# Patient Record
Sex: FEMALE | Race: WHITE | NOT HISPANIC OR LATINO | Employment: FULL TIME | URBAN - METROPOLITAN AREA
[De-identification: names, ages, dates, MRNs, and addresses within clinical notes are randomized per-mention and may not be internally consistent; named-entity substitution may affect disease eponyms.]

---

## 2017-07-07 DIAGNOSIS — T50.2X5A: Primary | ICD-10-CM

## 2017-07-07 DIAGNOSIS — E03.9 HYPOTHYROIDISM: ICD-10-CM

## 2017-07-07 RX ORDER — LEVOTHYROXINE SODIUM 75 UG/1
TABLET ORAL
Qty: 90 TABLET | Refills: 0 | Status: SHIPPED | OUTPATIENT
Start: 2017-07-07 | End: 2017-09-20 | Stop reason: SDUPTHER

## 2017-07-31 ENCOUNTER — LAB VISIT (OUTPATIENT)
Dept: LAB | Facility: HOSPITAL | Age: 42
End: 2017-07-31
Attending: EMERGENCY MEDICINE
Payer: COMMERCIAL

## 2017-07-31 DIAGNOSIS — T50.2X5A: ICD-10-CM

## 2017-07-31 DIAGNOSIS — E03.9 HYPOTHYROIDISM: ICD-10-CM

## 2017-07-31 LAB
ALBUMIN SERPL BCP-MCNC: 3.9 G/DL
ANION GAP SERPL CALC-SCNC: 11 MMOL/L
BUN SERPL-MCNC: 11 MG/DL
CALCIUM SERPL-MCNC: 9.1 MG/DL
CHLORIDE SERPL-SCNC: 106 MMOL/L
CO2 SERPL-SCNC: 22 MMOL/L
CREAT SERPL-MCNC: 0.9 MG/DL
EST. GFR  (AFRICAN AMERICAN): >60 ML/MIN/1.73 M^2
EST. GFR  (NON AFRICAN AMERICAN): >60 ML/MIN/1.73 M^2
GLUCOSE SERPL-MCNC: 83 MG/DL
PHOSPHATE SERPL-MCNC: 3 MG/DL
POTASSIUM SERPL-SCNC: 4.3 MMOL/L
SODIUM SERPL-SCNC: 139 MMOL/L
TSH SERPL DL<=0.005 MIU/L-ACNC: 2.44 UIU/ML

## 2017-07-31 PROCEDURE — 80069 RENAL FUNCTION PANEL: CPT

## 2017-07-31 PROCEDURE — 84443 ASSAY THYROID STIM HORMONE: CPT

## 2017-07-31 PROCEDURE — 36415 COLL VENOUS BLD VENIPUNCTURE: CPT | Mod: PO

## 2017-09-20 DIAGNOSIS — E03.9 HYPOTHYROIDISM: ICD-10-CM

## 2017-09-20 RX ORDER — LEVOTHYROXINE SODIUM 75 UG/1
TABLET ORAL
Qty: 90 TABLET | Refills: 0 | Status: SHIPPED | OUTPATIENT
Start: 2017-09-20 | End: 2017-12-17 | Stop reason: SDUPTHER

## 2017-12-17 DIAGNOSIS — E03.9 HYPOTHYROIDISM: ICD-10-CM

## 2017-12-17 RX ORDER — LEVOTHYROXINE SODIUM 75 UG/1
TABLET ORAL
Qty: 90 TABLET | Refills: 1 | Status: SHIPPED | OUTPATIENT
Start: 2017-12-17 | End: 2018-06-12 | Stop reason: SDUPTHER

## 2017-12-18 ENCOUNTER — OFFICE VISIT (OUTPATIENT)
Dept: URGENT CARE | Facility: CLINIC | Age: 42
End: 2017-12-18
Payer: COMMERCIAL

## 2017-12-18 VITALS
HEART RATE: 67 BPM | TEMPERATURE: 98 F | RESPIRATION RATE: 16 BRPM | OXYGEN SATURATION: 98 % | DIASTOLIC BLOOD PRESSURE: 6 MMHG | SYSTOLIC BLOOD PRESSURE: 104 MMHG

## 2017-12-18 DIAGNOSIS — J10.1 INFLUENZA B: Primary | ICD-10-CM

## 2017-12-18 LAB
CTP QC/QA: YES
FLUAV AG NPH QL: NEGATIVE
FLUBV AG NPH QL: POSITIVE

## 2017-12-18 PROCEDURE — 99213 OFFICE O/P EST LOW 20 MIN: CPT | Mod: 25,S$GLB,, | Performed by: INTERNAL MEDICINE

## 2017-12-18 PROCEDURE — 87804 INFLUENZA ASSAY W/OPTIC: CPT | Mod: QW,S$GLB,, | Performed by: INTERNAL MEDICINE

## 2017-12-18 RX ORDER — OSELTAMIVIR PHOSPHATE 75 MG/1
75 CAPSULE ORAL 2 TIMES DAILY
Qty: 10 CAPSULE | Refills: 0 | Status: SHIPPED | OUTPATIENT
Start: 2017-12-18 | End: 2017-12-23

## 2017-12-18 NOTE — PATIENT INSTRUCTIONS
Influenza (Adult)    Influenza is also called the flu. It is a viral illness that affects the air passages of your lungs. It is different from the common cold. The flu can easily be passed from one to person to another. It may be spread through the air by coughing and sneezing. Or it can be spread by touching the sick person and then touching your own eyes, nose, or mouth.  The flu starts 1 to 3 days after you are exposed to the flu virus. It may last for 1 to 2 weeks but many people feel tired or fatigued for many weeks afterward. You usually dont need to take antibiotics unless you have a complication. This might be an ear or sinus infection or pneumonia.  Symptoms of the flu may be mild or severe. They can include extreme tiredness (wanting to stay in bed all day), chills, fevers, muscle aches, soreness with eye movement, headache, and a dry, hacking cough.  Home care  Follow these guidelines when caring for yourself at home:  · Avoid being around cigarette smoke, whether yours or other peoples.  · Acetaminophen or ibuprofen will help ease your fever, muscle aches, and headache. Dont give aspirin to anyone younger than 18 who has the flu. Aspirin can harm the liver.  · Nausea and loss of appetite are common with the flu. Eat light meals. Drink 6 to 8 glasses of liquids every day. Good choices are water, sport drinks, soft drinks without caffeine, juices, tea, and soup. Extra fluids will also help loosen secretions in your nose and lungs.  · Over-the-counter cold medicines will not make the flu go away faster. But the medicines may help with coughing, sore throat, and congestion in your nose and sinuses. Dont use a decongestant if you have high blood pressure.  · Stay home until your fever has been gone for at least 24 hours without using medicine to reduce fever.  Follow-up care  Follow up with your healthcare provider, or as advised, if you are not getting better over the next week.  If you are age 65 or  older, talk with your provider about getting a pneumococcal vaccine every 5 years. You should also get this vaccine if you have chronic asthma or COPD. All adults should get a flu vaccine every fall. Ask your provider about this.  When to seek medical advice  Call your healthcare provider right away if any of these occur:  · Cough with lots of colored mucus (sputum) or blood in your mucus  · Chest pain, shortness of breath, wheezing, or trouble breathing  · Severe headache, or face, neck, or ear pain  · New rash with fever  · Fever of 100.4°F (38°C) or higher, or as directed by your healthcare provider  · Confusion, behavior change, or seizure  · Severe weakness or dizziness  · You get a new fever or cough after getting better for a few days  .

## 2017-12-18 NOTE — PROGRESS NOTES
Subjective:       Patient ID: Brenna Soni is a 42 y.o. female.    Vitals:  oral temperature is 98 °F (36.7 °C). Her blood pressure is 104/6 (abnormal) and her pulse is 67. Her respiration is 16 and oxygen saturation is 98%.     Chief Complaint: Cough    PT C/O PRODUCTIVE COUGH, 2 DAYS, NASAL CONGESTION, POST NASAL DRIP, SORE THROAT, HEADACHES, LEFT EAR CONGESTION, BODY ACHES, CHILLS, TEMP AT HOME 99.8 ORAL, TAKING MOTRIN AND ZYRTEC WITH MILD RELIEF       Cough   This is a new problem. The current episode started in the past 7 days. The problem has been unchanged. The problem occurs constantly. The cough is productive of sputum. Associated symptoms include chills, ear congestion, headaches, myalgias, nasal congestion, postnasal drip and a sore throat. Pertinent negatives include no chest pain, ear pain, eye redness, fever, shortness of breath or wheezing. Treatments tried: MOTRIN, ZYRTEC. The treatment provided mild relief.     Review of Systems   Constitution: Positive for chills and malaise/fatigue. Negative for fever.   HENT: Positive for congestion, postnasal drip and sore throat. Negative for ear pain and hoarse voice.    Eyes: Negative for discharge and redness.   Cardiovascular: Negative for chest pain, dyspnea on exertion and leg swelling.   Respiratory: Positive for cough and sputum production. Negative for shortness of breath and wheezing.    Musculoskeletal: Positive for myalgias.   Gastrointestinal: Negative for abdominal pain and nausea.   Neurological: Positive for headaches.       Objective:      Physical Exam   Constitutional: She is oriented to person, place, and time. She appears well-developed and well-nourished. She is cooperative.  Non-toxic appearance. She does not appear ill. No distress.   HENT:   Head: Normocephalic and atraumatic.   Right Ear: Hearing, external ear and ear canal normal. A middle ear effusion is present.   Left Ear: Hearing, external ear and ear canal normal. A  middle ear effusion is present.   Nose: Nose normal. No mucosal edema, rhinorrhea or nasal deformity. No epistaxis. Right sinus exhibits no maxillary sinus tenderness and no frontal sinus tenderness. Left sinus exhibits no maxillary sinus tenderness and no frontal sinus tenderness.   Mouth/Throat: Uvula is midline and mucous membranes are normal. No trismus in the jaw. Normal dentition. No uvula swelling. Posterior oropharyngeal erythema present.   Eyes: Conjunctivae and lids are normal.   Sclera clear bilat   Neck: Trachea normal, full passive range of motion without pain and phonation normal. Neck supple.   Cardiovascular: Normal rate, regular rhythm, normal heart sounds, intact distal pulses and normal pulses.    Pulmonary/Chest: Effort normal and breath sounds normal. No respiratory distress.   Abdominal: Soft. Normal appearance and bowel sounds are normal.   Musculoskeletal: Normal range of motion. She exhibits no edema or deformity.   Neurological: She is alert and oriented to person, place, and time. She exhibits normal muscle tone. Coordination normal.   Skin: Skin is warm, dry and intact. No rash noted. She is not diaphoretic.   Psychiatric: She has a normal mood and affect. Her speech is normal. Cognition and memory are normal.   Nursing note and vitals reviewed.      Assessment:       1. Influenza B        Plan:         Influenza B  -     POCT Influenza A/B  -     oseltamivir (TAMIFLU) 75 MG capsule; Take 1 capsule (75 mg total) by mouth 2 (two) times daily.  Dispense: 10 capsule; Refill: 0

## 2018-02-07 ENCOUNTER — TELEPHONE (OUTPATIENT)
Dept: OPTOMETRY | Facility: CLINIC | Age: 43
End: 2018-02-07

## 2018-03-26 ENCOUNTER — HOSPITAL ENCOUNTER (OUTPATIENT)
Dept: RADIOLOGY | Facility: HOSPITAL | Age: 43
Discharge: HOME | End: 2018-03-26
Attending: INTERNAL MEDICINE
Payer: COMMERCIAL

## 2018-03-26 ENCOUNTER — DOCUMENTATION ONLY (OUTPATIENT)
Dept: FAMILY MEDICINE | Facility: CLINIC | Age: 43
End: 2018-03-26

## 2018-03-26 DIAGNOSIS — M54.50 CHRONIC BILATERAL LOW BACK PAIN WITHOUT SCIATICA: ICD-10-CM

## 2018-03-26 DIAGNOSIS — G89.29 CHRONIC BILATERAL LOW BACK PAIN WITHOUT SCIATICA: ICD-10-CM

## 2018-03-26 PROCEDURE — 72110 X-RAY EXAM L-2 SPINE 4/>VWS: CPT | Mod: 26,,, | Performed by: RADIOLOGY

## 2018-03-26 PROCEDURE — 72110 X-RAY EXAM L-2 SPINE 4/>VWS: CPT | Mod: TC,FY,PO

## 2018-03-26 NOTE — PROGRESS NOTES
"Celecoxib 200mg    3/26/18: PA Initiated  CMM key: [id number]      "Drug is covered by current benefit plan. No further PA activity needed."  "

## 2018-03-28 ENCOUNTER — TELEPHONE (OUTPATIENT)
Dept: FAMILY MEDICINE | Facility: CLINIC | Age: 43
End: 2018-03-28

## 2018-03-28 NOTE — TELEPHONE ENCOUNTER
----- Message from Lynette Haq sent at 3/28/2018 10:11 AM CDT -----  Contact: [facility]/Angela/[phone]  Physical Therapy states that they faxed a prescription to continue physical therapy to your office.  Please sign and fax back to [phone].  Any questions call [facility]/Angela at [phone].

## 2018-04-16 PROBLEM — G89.29 CHRONIC BILATERAL LOW BACK PAIN WITHOUT SCIATICA: Status: ACTIVE | Noted: 2018-04-16

## 2018-04-16 PROBLEM — M54.50 CHRONIC BILATERAL LOW BACK PAIN WITHOUT SCIATICA: Status: ACTIVE | Noted: 2018-04-16

## 2018-04-16 PROBLEM — M47.816 LUMBAR SPONDYLOSIS: Status: ACTIVE | Noted: 2018-04-16

## 2018-04-16 PROBLEM — M79.18 MYOFASCIAL PAIN SYNDROME: Status: ACTIVE | Noted: 2018-04-16

## 2018-04-16 NOTE — PROGRESS NOTES
[facility] New Patient Evaluation    Referred by: Niraj Henderson MD  Reason for referral: M54.5,G89.29 (ICD-10-CM) - Chronic bilateral low back pain without sciatica    CC:   Chief Complaint   Patient presents with    Low-back Pain    Hip Pain     bilateral     No flowsheet data found.    HPI:   Brenna Soni is a 42 y.o. female who complains of low back pain radiating into the hips bilaterally.  She recently completed a medrol dose pack that helped tremendously and she stopped going to the gym for 3 weeks to let her body rest.    Onset: long-standing and insidious  Current Pain Score: 5/10  Typical Range: 5-9/10  Quality: Aching, Throbbing, Tight and Band-like  Radiation: across the low back and hips  Worsened by: sitting, standing for more than 4 minutes and walking for more than 4 minutes  Improved by: heat, ice, massage and rest all provide short-lived relief    Previous Therapies:  PT/OT: Completed 2 roudns of PT in the last   HEP: Daily stretching and works out regularly with a   TENS: Yes, with minimal relief  Interventions: Denies  Surgery: Denies  Medications:   - NSAIDS: Yes  - MSK Relaxants: Yes  - TCAs:   - SNRIs:   - Topicals: No  - Anticonvulsants: No  - Opioids: No    Current Pain Medications:  1. Ibuprofen 800    2. Flexeril  3. Robaxin    Review of Systems:  Review of Systems   Constitutional: Negative for chills and fever.   HENT: Negative for nosebleeds.    Eyes: Negative for pain.   Respiratory: Negative for hemoptysis.    Cardiovascular: Negative for chest pain.   Gastrointestinal: Negative for nausea and vomiting.   Genitourinary: Negative for dysuria.   Musculoskeletal: Positive for back pain. Negative for falls.   Skin: Negative for rash.   Neurological: Negative for sensory change and focal weakness.   Endo/Heme/Allergies: Bruises/bleeds easily.   Psychiatric/Behavioral: The patient has insomnia (due to pain).        History:    Current Outpatient Prescriptions:      bisacodyl (DULCOLAX, BISACODYL,) 5 mg EC tablet, as needed., Disp: , Rfl:     celecoxib (CELEBREX) 200 MG capsule, Take 1 capsule (200 mg total) by mouth once daily. May take 2nd capsule if needed, Disp: 60 capsule, Rfl: 6    cyclobenzaprine (FLEXERIL) 10 MG tablet, Take 1 tablet (10 mg total) by mouth nightly as needed for Muscle spasms., Disp: 30 tablet, Rfl: 5    ibuprofen (ADVIL,MOTRIN) 800 MG tablet, Take 1 tablet (800 mg total) by mouth after meals as needed for Pain. Don't take with Celebrex, Disp: 60 tablet, Rfl: 0    ibuprofen 200 mg Cap, Take 1 tablet by mouth every 8 (eight) hours as needed., Disp: , Rfl:     levothyroxine (SYNTHROID) 75 MCG tablet, TAKE 1 TABLET BY MOUTH DAILY, Disp: 90 tablet, Rfl: 1    LINZESS 290 mcg Cap, Take 1 tablet by mouth once daily., Disp: , Rfl: 0    methocarbamol (ROBAXIN) 500 MG Tab, 1-2 tabs po qid prn muscle spasm, Disp: 90 tablet, Rfl: 3    methylPREDNISolone (MEDROL DOSEPACK) 4 mg tablet, Take as directed, Disp: 21 tablet, Rfl: 0    progesterone (PROMETRIUM) 100 MG capsule, Take 1 capsule by mouth once daily., Disp: , Rfl:     testosterone cypionate (DEPOTESTOTERONE CYPIONATE) 200 mg/mL injection, every 30 days., Disp: , Rfl: 3    vitamin D (VITAMIN D3) 1000 units Tab, Take 185 mg by mouth once daily., Disp: , Rfl:     Past Medical History:   Diagnosis Date    Anal fissure     Hypothyroid 2013    Localized adiposity     trunk    Rectal bleeding     TBI (traumatic brain injury)     concussion, CT done.      Vertigo     Water retention     takes diuretic       Past Surgical History:   Procedure Laterality Date    BREAST SURGERY      augmentation     SECTION      COSMETIC SURGERY  2015    tummy tuck    gastric sleeve      laparoscopic abdominal      times 2    Tummy tuck      uterine ablation         Family History   Problem Relation Age of Onset    Adopted: Yes       Social History     Social History    Marital status:  "     Spouse name: N/A    Number of children: N/A    Years of education: N/A     Occupational History     [employer]     Social History Main Topics    Smoking status: Never Smoker    Smokeless tobacco: Never Used    Alcohol use Yes      Comment: 2-3 glasses of wine/week    Drug use: No    Sexual activity: Not Asked     Other Topics Concern    None     Social History Narrative    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       Review of patient's allergies indicates:  No Known Allergies    Physical Exam:  Vitals:    04/17/18 1555   BP: 117/67   Pulse: 63   Resp: 20   Weight: 67.3 kg (148 lb 5.9 oz)   Height: 5' 4" (1.626 m)   PainSc:   5   PainLoc: Back     General    Nursing note and vitals reviewed.  Constitutional: She is oriented to person, place, and time. She appears well-developed and well-nourished. No distress.   HENT:   Head: Normocephalic and atraumatic.   Nose: Nose normal.   Eyes: Conjunctivae and EOM are normal. Pupils are equal, round, and reactive to light. Right eye exhibits no discharge. Left eye exhibits no discharge. No scleral icterus.   Neck: No JVD present.   Cardiovascular: Intact distal pulses.    Pulmonary/Chest: Effort normal. No respiratory distress.   Abdominal: She exhibits no distension.   Neurological: She is alert and oriented to person, place, and time. Coordination normal.   Psychiatric: She has a normal mood and affect. Her behavior is normal. Judgment and thought content normal.     General Musculoskeletal Exam   Gait: normal     Back (L-Spine & T-Spine) / Neck (C-Spine) Exam     Tenderness Right paramedian tenderness of the Lower L-Spine. Left paramedian tenderness of the Lower L-Spine.     Back (L-Spine & T-Spine) Range of Motion   Extension: abnormal   Flexion: abnormal     Spinal Sensation   Right Side Sensation  L-Spine Level: normal  Left Side Sensation  L-Spine Level: normal    Other She has no scoliosis .      Muscle Strength   Right " Lower Extremity   Hip Flexion: 5/5   Hip Extensors: 5/5  Quadriceps:  5/5   Hamstrin/5   Gastrocsoleus:  5/5/5  Left Lower Extremity   Hip Flexion: 5/5   Hip Extensors: 5/5  Quadriceps:  5/5   Hamstrin/5   Gastrocsoleus:  5/5/5    Reflexes     Left Side  Quadriceps:  2+  Achilles:  2+    Right Side   Quadriceps:  2+  Achilles:  2+    Imaging:  MRI Lumbar Spine W WO Contrast 18  There is normal lordosis of the lumbar spine without spondylolisthesis or spondylosis.  There is no marrow edema to suggest acute fractures or neoplastic processes.  There is a small part of the cyst in the sacral canal at the S. to S3 level.  Tip of the conus is at L1 level.  Paraspinal soft tissues are unremarkable.  L5-S1: There is dehydration of the nucleus pulposus.  There is a mild posterior bulging of the annulus with a associated radial annular fissure.  No significant compression of the thecal sac or  S1 roots or spinal stenosis.  There is mild bilateral foraminal stenosis.  L4-L5: There is dehydration of the nucleus pulposus associated with mild posterior bulge of the annulus and radial annular fissure.  No significant compression of the thecal sac.  There is degenerative hypertrophic facet arthropathy bilaterally.  Mild bilateral foraminal stenosis.  L3-L4: There is disc dehydration associated with a circumferential contained posterior annular bulge and a circumferential posterior annular fissure.  There is mild broad-based posterior compression of the thecal sac.  Degenerative hypertrophic facet arthropathy and mild hypertrophic changes of the ligamentum flavum noted.  No significant central canal spinal stenosis.  L2-L3: There is normal intervertebral disc height.  There is no disc herniations or spinal stenosis or foraminal stenosis.  L1-L2: No disc herniations or spinal stenosis or foraminal stenosis.  T12-L1: There is a Schmorl's node along the superior endplate of L1.  There is a mild posterior bulge of the  annulus without spinal stenosis or foraminal stenosis.  No abnormal intradural enhancing lesions are noted.      Labs:  BMP  Lab Results   Component Value Date     07/31/2017    K 4.3 07/31/2017     07/31/2017    CO2 22 (L) 07/31/2017    BUN 11 07/31/2017    CREATININE 0.9 07/31/2017    CALCIUM 9.1 07/31/2017    ANIONGAP 11 07/31/2017    ESTGFRAFRICA >60.0 07/31/2017    EGFRNONAA >60.0 07/31/2017     Lab Results   Component Value Date    ALT 36 08/28/2016    AST 52 (H) 08/28/2016    ALKPHOS 58 08/28/2016    BILITOT 0.7 08/28/2016       Assessment & Plan:  Problem List Items Addressed This Visit     Chronic bilateral low back pain without sciatica    Lumbar spondylosis - Primary    Myofascial pain syndrome          41 yo F with clbp due to facet arthropathy and myofascial dysfunction.  MRI is significant for evidence of facet disease and multiple levels.  DDD is mild and does not appear to contribute to her pain syndrome at this time.  Pain has been refractory to 2 rounds of PT, OTC medications, and rest.  She would be best served by a trial of Lumbar MBB followed by RFA if appropriate as the underlying cause (arthritis) is unlikely to resolve.  I have also recommended that she consider lower impact exercises to minimize pain in the back.  She would like to research the recommended intervention prior to committing.  I have also encouraged her to limit her NSAID usage if she wishes to minimize the risk of gastrici and stomach ulcers.      Follow Up: RTC prn    Abrahan Caballero Jr, MD  Interventional Pain Medicine / Anesthesiology    Disclaimer: This note was partly generated using dictation software which may occasionally result in transcription errors.

## 2018-04-17 ENCOUNTER — OFFICE VISIT (OUTPATIENT)
Dept: PAIN MEDICINE | Facility: CLINIC | Age: 43
End: 2018-04-17
Payer: COMMERCIAL

## 2018-04-17 VITALS
WEIGHT: 148.38 LBS | HEIGHT: 64 IN | SYSTOLIC BLOOD PRESSURE: 117 MMHG | DIASTOLIC BLOOD PRESSURE: 67 MMHG | BODY MASS INDEX: 25.33 KG/M2 | HEART RATE: 63 BPM | RESPIRATION RATE: 20 BRPM

## 2018-04-17 DIAGNOSIS — G89.29 CHRONIC BILATERAL LOW BACK PAIN WITHOUT SCIATICA: ICD-10-CM

## 2018-04-17 DIAGNOSIS — M54.50 CHRONIC BILATERAL LOW BACK PAIN WITHOUT SCIATICA: ICD-10-CM

## 2018-04-17 DIAGNOSIS — M47.816 LUMBAR SPONDYLOSIS: Primary | ICD-10-CM

## 2018-04-17 DIAGNOSIS — M79.18 MYOFASCIAL PAIN SYNDROME: ICD-10-CM

## 2018-04-17 PROCEDURE — 99244 OFF/OP CNSLTJ NEW/EST MOD 40: CPT | Mod: S$GLB,,, | Performed by: PAIN MEDICINE

## 2018-04-17 PROCEDURE — 99999 PR PBB SHADOW E&M-EST. PATIENT-LVL III: CPT | Mod: PBBFAC,,, | Performed by: PAIN MEDICINE

## 2018-04-17 NOTE — LETTER
April 17, 2018      Niraj Henderson MD  [address] [facility] [address] [place] [address]           [facility]  [address] [facility] [address] [place] [address]  Phone: [phone]  Fax: [phone]          Patient: Brenna Soni   MR Number: [id number]   YOB: 1975   Date of Visit: 4/17/2018       Dear Dr. Niraj Henderson:    Thank you for referring Brenna Soni to me for evaluation. Attached you will find relevant portions of my assessment and plan of care.    If you have questions, please do not hesitate to call me. I look forward to following Brenna Soni along with you.    Sincerely,    Abrahan Caballero Jr., MD    Enclosure  CC:  No Recipients    If you would like to receive this communication electronically, please contact [email or web address] or [phone] to request more information on [employer] Link access.    For providers and/or their staff who would like to refer a patient to [facility], please contact us through our one-stop-shop provider referral line, [facility], at [phone].    If you feel you have received this communication in error or would no longer like to receive these types of communications, please e-mail [email or web address]

## 2018-06-12 ENCOUNTER — LAB VISIT (OUTPATIENT)
Dept: LAB | Facility: HOSPITAL | Age: 43
End: 2018-06-12
Attending: EMERGENCY MEDICINE
Payer: COMMERCIAL

## 2018-06-12 DIAGNOSIS — E03.9 HYPOTHYROIDISM: ICD-10-CM

## 2018-06-12 LAB
ALBUMIN SERPL BCP-MCNC: 3.9 G/DL
ALP SERPL-CCNC: 45 U/L
ALT SERPL W/O P-5'-P-CCNC: 25 U/L
ANION GAP SERPL CALC-SCNC: 6 MMOL/L
AST SERPL-CCNC: 28 U/L
BILIRUB SERPL-MCNC: 0.5 MG/DL
BUN SERPL-MCNC: 9 MG/DL
CALCIUM SERPL-MCNC: 9.6 MG/DL
CHLORIDE SERPL-SCNC: 104 MMOL/L
CO2 SERPL-SCNC: 29 MMOL/L
CREAT SERPL-MCNC: 0.9 MG/DL
EST. GFR  (AFRICAN AMERICAN): >60 ML/MIN/1.73 M^2
EST. GFR  (NON AFRICAN AMERICAN): >60 ML/MIN/1.73 M^2
GLUCOSE SERPL-MCNC: 104 MG/DL
POTASSIUM SERPL-SCNC: 4.3 MMOL/L
PROT SERPL-MCNC: 7.2 G/DL
SODIUM SERPL-SCNC: 139 MMOL/L
TSH SERPL DL<=0.005 MIU/L-ACNC: 0.68 UIU/ML

## 2018-06-12 PROCEDURE — 84443 ASSAY THYROID STIM HORMONE: CPT

## 2018-06-12 PROCEDURE — 80053 COMPREHEN METABOLIC PANEL: CPT

## 2018-06-12 PROCEDURE — 36415 COLL VENOUS BLD VENIPUNCTURE: CPT | Mod: PO

## 2018-06-12 RX ORDER — LEVOTHYROXINE SODIUM 75 UG/1
TABLET ORAL
Qty: 90 TABLET | Refills: 3 | Status: SHIPPED | OUTPATIENT
Start: 2018-06-12 | End: 2019-01-08 | Stop reason: SDUPTHER

## 2018-10-24 ENCOUNTER — OFFICE VISIT (OUTPATIENT)
Dept: FAMILY MEDICINE | Facility: CLINIC | Age: 43
End: 2018-10-24
Payer: COMMERCIAL

## 2018-10-24 VITALS
DIASTOLIC BLOOD PRESSURE: 78 MMHG | WEIGHT: 142.88 LBS | HEIGHT: 64 IN | BODY MASS INDEX: 24.39 KG/M2 | HEART RATE: 52 BPM | SYSTOLIC BLOOD PRESSURE: 100 MMHG

## 2018-10-24 DIAGNOSIS — M75.81 RIGHT ROTATOR CUFF TENDONITIS: Primary | ICD-10-CM

## 2018-10-24 DIAGNOSIS — S49.91XA INJURY OF RIGHT SHOULDER, INITIAL ENCOUNTER: ICD-10-CM

## 2018-10-24 PROCEDURE — 90715 TDAP VACCINE 7 YRS/> IM: CPT | Mod: S$GLB,,, | Performed by: PHYSICIAN ASSISTANT

## 2018-10-24 PROCEDURE — 99214 OFFICE O/P EST MOD 30 MIN: CPT | Mod: 25,S$GLB,, | Performed by: PHYSICIAN ASSISTANT

## 2018-10-24 PROCEDURE — 90471 IMMUNIZATION ADMIN: CPT | Mod: S$GLB,,, | Performed by: PHYSICIAN ASSISTANT

## 2018-10-24 PROCEDURE — 3008F BODY MASS INDEX DOCD: CPT | Mod: CPTII,S$GLB,, | Performed by: PHYSICIAN ASSISTANT

## 2018-10-24 PROCEDURE — 90472 IMMUNIZATION ADMIN EACH ADD: CPT | Mod: S$GLB,,, | Performed by: PHYSICIAN ASSISTANT

## 2018-10-24 PROCEDURE — 99999 PR PBB SHADOW E&M-EST. PATIENT-LVL III: CPT | Mod: PBBFAC,,, | Performed by: PHYSICIAN ASSISTANT

## 2018-10-24 PROCEDURE — 90686 IIV4 VACC NO PRSV 0.5 ML IM: CPT | Mod: S$GLB,,, | Performed by: PHYSICIAN ASSISTANT

## 2018-10-24 RX ORDER — CYCLOBENZAPRINE HCL 10 MG
TABLET ORAL
Refills: 4 | COMMUNITY
Start: 2018-09-05 | End: 2018-10-24

## 2018-10-24 NOTE — PROGRESS NOTES
Subjective:       Patient ID: Brenna Soni is a 43 y.o. female    Chief Complaint: Shoulder Injury (injured right shoulder since March and it's starting to get worst. Injured it snow skiing. Wants flu shot and tetanus)    HPI  Mrs Soni presents today CO right shoulder injury that occurred in March while she was snow skiing.  She does not recall the way that she fell.  Her pain has persisted since her injury.    Review of Systems   Constitutional: Negative for activity change, chills and fever.   HENT: Negative for congestion and sore throat.    Eyes: Negative for visual disturbance.   Respiratory: Negative for cough and shortness of breath.    Cardiovascular: Negative for chest pain and palpitations.   Gastrointestinal: Negative for abdominal pain, diarrhea, nausea and vomiting.   Endocrine: Negative for polydipsia and polyuria.   Musculoskeletal: Positive for arthralgias (right shoulder pain in the anterior shoulder and posterior shoulder, limited ROM). Negative for myalgias.   Skin: Negative for rash.   Neurological: Negative for headaches.   Psychiatric/Behavioral: The patient is not nervous/anxious.         Objective:   Physical Exam   Constitutional: She is oriented to person, place, and time. She appears well-developed and well-nourished. No distress.   HENT:   Head: Normocephalic and atraumatic.   Eyes: EOM are normal. Pupils are equal, round, and reactive to light.   Neck: Normal range of motion. Neck supple.   Cardiovascular: Normal rate, regular rhythm, normal heart sounds and intact distal pulses. Exam reveals no gallop and no friction rub.   No murmur heard.  Pulmonary/Chest: Effort normal and breath sounds normal. No respiratory distress. She has no wheezes. She has no rales. She exhibits no tenderness.   Musculoskeletal: She exhibits tenderness (ttp of the right shoulder anteriorly and posteriorly, positive hawkings test, limited shoudler flexion and abduction to 90 degrees).   No ttp of  the AC joint or laterally over the subacromial bursa, no edema or wound   Neurological: She is alert and oriented to person, place, and time.   Skin: Skin is warm and dry. No rash noted. She is not diaphoretic.   Psychiatric: She has a normal mood and affect. Her behavior is normal. Judgment and thought content normal.        Assessment:       1. Right rotator cuff tendonitis  MRI Shoulder Without Contrast Right    Ambulatory referral to Orthopedics   2. Injury of right shoulder, initial encounter  MRI Shoulder Without Contrast Right    Ambulatory referral to Orthopedics        Plan:       Right rotator cuff tendonitis  -     MRI Shoulder Without Contrast Right; Future; Expected date: 10/24/2018  -     Ambulatory referral to Orthopedics    Injury of right shoulder, initial encounter  -     MRI Shoulder Without Contrast Right; Future; Expected date: 10/24/2018  -     Ambulatory referral to Orthopedics    Other orders  -     (In Office Administered) Tdap Vaccine

## 2018-11-02 DIAGNOSIS — M25.511 RIGHT SHOULDER PAIN, UNSPECIFIED CHRONICITY: Primary | ICD-10-CM

## 2018-11-05 ENCOUNTER — HOSPITAL ENCOUNTER (OUTPATIENT)
Dept: RADIOLOGY | Facility: HOSPITAL | Age: 43
Discharge: HOME | End: 2018-11-05
Attending: ORTHOPAEDIC SURGERY
Payer: COMMERCIAL

## 2018-11-05 ENCOUNTER — OFFICE VISIT (OUTPATIENT)
Dept: ORTHOPEDICS | Facility: CLINIC | Age: 43
End: 2018-11-05
Payer: COMMERCIAL

## 2018-11-05 VITALS — BODY MASS INDEX: 24.24 KG/M2 | HEIGHT: 64 IN | WEIGHT: 142 LBS
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
## 2018-11-05 DIAGNOSIS — M25.511 RIGHT SHOULDER PAIN, UNSPECIFIED CHRONICITY: ICD-10-CM

## 2018-11-05 DIAGNOSIS — G89.29 CHRONIC RIGHT SHOULDER PAIN: Primary | ICD-10-CM

## 2018-11-05 DIAGNOSIS — M25.511 CHRONIC RIGHT SHOULDER PAIN: Primary | ICD-10-CM

## 2018-11-05 DIAGNOSIS — M75.81 ROTATOR CUFF TENDONITIS, RIGHT: ICD-10-CM

## 2018-11-05 PROCEDURE — 99203 OFFICE O/P NEW LOW 30 MIN: CPT | Mod: 25,S$GLB,, | Performed by: ORTHOPAEDIC SURGERY

## 2018-11-05 PROCEDURE — 73030 X-RAY EXAM OF SHOULDER: CPT | Mod: 26,RT,, | Performed by: RADIOLOGY

## 2018-11-05 PROCEDURE — 73030 X-RAY EXAM OF SHOULDER: CPT | Mod: TC,PO,RT

## 2018-11-05 PROCEDURE — 20610 DRAIN/INJ JOINT/BURSA W/O US: CPT | Mod: RT,S$GLB,, | Performed by: ORTHOPAEDIC SURGERY

## 2018-11-05 PROCEDURE — 99999 PR PBB SHADOW E&M-EST. PATIENT-LVL II: CPT | Mod: PBBFAC,,, | Performed by: ORTHOPAEDIC SURGERY

## 2018-11-05 RX ORDER — TRIAMCINOLONE ACETONIDE 40 MG/ML
80 INJECTION, SUSPENSION INTRA-ARTICULAR; INTRAMUSCULAR
Status: DISCONTINUED | OUTPATIENT
Start: 2018-11-05 | End: 2018-11-05 | Stop reason: HOSPADM

## 2018-11-05 RX ADMIN — TRIAMCINOLONE ACETONIDE 80 MG: 40 INJECTION, SUSPENSION INTRA-ARTICULAR; INTRAMUSCULAR at 08:11

## 2018-11-05 NOTE — Clinical Note
November 5, 2018      Jocelynn Hazel PA-C  [address] [facility] [address]  [facility] [address]           [place]  [address] [facility] [address]  [facility] [address]  Phone: [phone]          Patient: Brenna Soni   MR Number: [id number]   YOB: 1975   Date of Visit: 11/5/2018       Dear Jocelynn Hazel:    Thank you for referring Brenna Soni to me for evaluation. Attached you will find relevant portions of my assessment and plan of care.    If you have questions, please do not hesitate to call me. I look forward to following Brenna Soni along with you.    Sincerely,    Jf Rodriguez MD    Enclosure  CC:  No Recipients    If you would like to receive this communication electronically, please contact [email or web address] or [phone] to request more information on [employer] Link access.    For providers and/or their staff who would like to refer a patient to [facility], please contact us through our one-stop-shop provider referral line, [facility], at [phone].    If you feel you have received this communication in error or would no longer like to receive these types of communications, please e-mail [email or web address]

## 2018-11-05 NOTE — PROCEDURES
Large Joint Aspiration/Injection: R subacromial bursa  Date/Time: 11/5/2018 8:08 AM  Performed by: Jf Rodriguez MD  Authorized by: Jf Rodriguez MD     Consent Done?:  Yes (Verbal)  Indications:  Pain  Procedure site marked: Yes    Timeout: Prior to procedure the correct patient, procedure, and site was verified      Location:  Shoulder  Site:  R subacromial bursa  Prep: Patient was prepped and draped in usual sterile fashion    Ultrasonic Guidance for needle placement: No  Needle size:  21 G  Approach:  Posterior  Medications:  80 mg triamcinolone acetonide 40 mg/mL  Patient tolerance:  Patient tolerated the procedure well with no immediate complications

## 2018-11-05 NOTE — PROGRESS NOTES
43 years old, complaining of right shoulder pain, which she has had now for six   months' time.  She describes as decreasing motion and increasing pain over the   past six months, feels it all started after she fell snow skiing.  Rates pain as   2/10 on good days, 8/10 on bad days.  Ibuprofen does not really seem to help   all that much.    PHYSICAL EXAMINATION:  Today shows she has full active and passive motion.  She   has a positive Neer and Hawkin impingement sign.  Painful cuff strength.  No   instability.    She did have an MRI of her shoulder, which shows cuff tendinitis, possible   degenerative labral changes.  Degenerative changes of the AC joint.    ASSESSMENT:  Degenerative disease of the shoulder involving the   acromioclavicular joint, the labrum, and the rotator cuff.    PLAN:  Subacromial injection into the right shoulder.  Recommend a rotator cuff   strengthening and balancing either with physical therapy or home exercise   program.  Follow up as needed.      JAZLYN/LEONARDO  dd: 11/05/2018 10:14:03 (CST)  td: 11/05/2018 23:49:46 (CST)  Doc ID   #[id number]  Job ID #[id number]    CC:     Further History  Aching pain  Worse with activity  Relieved with rest  No other associated symptoms  No other radiation    Further Exam  Alert and oriented  Pleasant  Contralateral limb has appropriate range of motion for age and condition  Contralateral limb has appropriate strength for age and condition  Contralateral limb has appropriate stability  for age and condition  No adenopathy  Pulses are appropriate for current condition  Skin is intact        Chief Complaint    Chief Complaint   Patient presents with    Right Shoulder - Injury, Pain     Fell during snow skiing in March HPI  Brenna Soni is a 43 y.o.  female who presents with       Past Medical History  Past Medical History:   Diagnosis Date    Anal fissure     Hypothyroid 6/13/2013    Localized adiposity     trunk    Rectal bleeding     TBI  (traumatic brain injury) 2012    concussion, CT done.      Vertigo     Water retention     takes diuretic       Past Surgical History  Past Surgical History:   Procedure Laterality Date    ABDOMINOPLASTY N/A 3/19/2015    [facility]    BREAST SURGERY      augmentation     SECTION      COSMETIC SURGERY  2015    tummy tuck    gastric sleeve      laparoscopic abdominal      times 2    Tummy tuck      uterine ablation         Medications  Current Outpatient Medications   Medication Sig    bisacodyl (DULCOLAX, BISACODYL,) 5 mg EC tablet as needed.    celecoxib (CELEBREX) 200 MG capsule Take 1 capsule (200 mg total) by mouth once daily. May take 2nd capsule if needed    ibuprofen (ADVIL,MOTRIN) 800 MG tablet Take 1 tablet (800 mg total) by mouth after meals as needed for Pain. Don't take with Celebrex    levothyroxine (SYNTHROID) 75 MCG tablet TAKE 1 TABLET BY MOUTH DAILY    LINZESS 290 mcg Cap Take 1 tablet by mouth once daily.    vitamin D (VITAMIN D3) 1000 units Tab Take 185 mg by mouth once daily.     No current facility-administered medications for this visit.        Allergies  Review of patient's allergies indicates:  No Known Allergies    Family History  Family History   Adopted: Yes       Social History  Social History     Socioeconomic History    Marital status:      Spouse name: Not on file    Number of children: Not on file    Years of education: Not on file    Highest education level: Not on file   Social Needs    Financial resource strain: Not on file    Food insecurity - worry: Not on file    Food insecurity - inability: Not on file    Transportation needs - medical: Not on file    Transportation needs - non-medical: Not on file   Occupational History     Employer: [employer]   Tobacco Use    Smoking status: Never Smoker    Smokeless tobacco: Never Used   Substance and Sexual Activity    Alcohol use: Yes      Comment: 2-3 glasses of wine/week    Drug use: No    Sexual activity: Not on file   Other Topics Concern    Not on file   Social History Narrative    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               Review of Systems     Constitutional: Negative    HENT: Negative  Eyes: Negative  Respiratory: Negative  Cardiovascular: Negative  Musculoskeletal: HPI  Skin: Negative  Neurological: Negative  Hematological: Negative  Endocrine: Negative                 Physical Exam    There were no vitals filed for this visit.  Body mass index is 24.37 kg/m².  Physical Examination:     General appearance -  well appearing, and in no distress  Mental status - awake  Neck - supple  Chest -  symmetric air entry  Heart - normal rate   Abdomen - soft      Assessment     1. Chronic right shoulder pain    2. Fall, initial encounter    3. Rotator cuff tendonitis, right          Plan

## 2019-01-08 ENCOUNTER — HOSPITAL ENCOUNTER (OUTPATIENT)
Dept: RADIOLOGY | Facility: HOSPITAL | Age: 44
Discharge: HOME | End: 2019-01-08
Attending: PHYSICIAN ASSISTANT
Payer: COMMERCIAL

## 2019-01-08 ENCOUNTER — OFFICE VISIT (OUTPATIENT)
Dept: FAMILY MEDICINE | Facility: CLINIC | Age: 44
End: 2019-01-08
Payer: COMMERCIAL

## 2019-01-08 VITALS
HEIGHT: 64 IN | BODY MASS INDEX: 24.96 KG/M2 | HEART RATE: 65 BPM | DIASTOLIC BLOOD PRESSURE: 80 MMHG | OXYGEN SATURATION: 97 % | SYSTOLIC BLOOD PRESSURE: 110 MMHG | TEMPERATURE: 99 F | WEIGHT: 146.19 LBS

## 2019-01-08 DIAGNOSIS — J40 BRONCHITIS: ICD-10-CM

## 2019-01-08 DIAGNOSIS — H65.03 BILATERAL ACUTE SEROUS OTITIS MEDIA, RECURRENCE NOT SPECIFIED: ICD-10-CM

## 2019-01-08 DIAGNOSIS — B37.31 VAGINAL CANDIDIASIS: ICD-10-CM

## 2019-01-08 DIAGNOSIS — J40 BRONCHITIS: Primary | ICD-10-CM

## 2019-01-08 PROCEDURE — 99214 OFFICE O/P EST MOD 30 MIN: CPT | Mod: S$GLB,,, | Performed by: PHYSICIAN ASSISTANT

## 2019-01-08 PROCEDURE — 71046 X-RAY EXAM CHEST 2 VIEWS: CPT | Mod: TC,FY,PO

## 2019-01-08 PROCEDURE — 99999 PR PBB SHADOW E&M-EST. PATIENT-LVL III: ICD-10-PCS | Mod: PBBFAC,,, | Performed by: PHYSICIAN ASSISTANT

## 2019-01-08 PROCEDURE — 3008F PR BODY MASS INDEX (BMI) DOCUMENTED: ICD-10-PCS | Mod: CPTII,S$GLB,, | Performed by: PHYSICIAN ASSISTANT

## 2019-01-08 PROCEDURE — 3008F BODY MASS INDEX DOCD: CPT | Mod: CPTII,S$GLB,, | Performed by: PHYSICIAN ASSISTANT

## 2019-01-08 PROCEDURE — 71046 XR CHEST PA AND LATERAL: ICD-10-PCS | Mod: 26,,, | Performed by: RADIOLOGY

## 2019-01-08 PROCEDURE — 71046 X-RAY EXAM CHEST 2 VIEWS: CPT | Mod: 26,,, | Performed by: RADIOLOGY

## 2019-01-08 PROCEDURE — 99214 PR OFFICE/OUTPT VISIT, EST, LEVL IV, 30-39 MIN: ICD-10-PCS | Mod: S$GLB,,, | Performed by: PHYSICIAN ASSISTANT

## 2019-01-08 PROCEDURE — 99999 PR PBB SHADOW E&M-EST. PATIENT-LVL III: CPT | Mod: PBBFAC,,, | Performed by: PHYSICIAN ASSISTANT

## 2019-01-08 RX ORDER — TRIAMTERENE AND HYDROCHLOROTHIAZIDE 37.5; 25 MG/1; MG/1
CAPSULE ORAL
COMMUNITY
End: 2019-03-22

## 2019-01-08 RX ORDER — DOCUSATE SODIUM 100 MG/1
CAPSULE, LIQUID FILLED ORAL
COMMUNITY
End: 2021-04-27 | Stop reason: ALTCHOICE

## 2019-01-08 RX ORDER — AMOXICILLIN AND CLAVULANATE POTASSIUM 875; 125 MG/1; MG/1
1 TABLET, FILM COATED ORAL EVERY 12 HOURS
Qty: 20 TABLET | Refills: 0 | Status: SHIPPED | OUTPATIENT
Start: 2019-01-08 | End: 2019-01-18

## 2019-01-08 RX ORDER — LEVOTHYROXINE SODIUM 75 UG/1
TABLET ORAL
COMMUNITY
End: 2019-06-28 | Stop reason: SDUPTHER

## 2019-01-08 RX ORDER — METHYLPREDNISOLONE 4 MG/1
TABLET ORAL
Qty: 1 PACKAGE | Refills: 0 | Status: SHIPPED | OUTPATIENT
Start: 2019-01-08 | End: 2019-01-29

## 2019-01-08 RX ORDER — CODEINE PHOSPHATE AND GUAIFENESIN 10; 100 MG/5ML; MG/5ML
5 SOLUTION ORAL 3 TIMES DAILY PRN
Qty: 236 ML | Refills: 1 | Status: SHIPPED | OUTPATIENT
Start: 2019-01-08 | End: 2019-01-18

## 2019-01-08 RX ORDER — ALBUTEROL SULFATE 90 UG/1
AEROSOL, METERED RESPIRATORY (INHALATION)
Refills: 1 | COMMUNITY
Start: 2019-01-02 | End: 2019-03-22 | Stop reason: ALTCHOICE

## 2019-01-08 RX ORDER — SENNOSIDES 8.6 MG/1
TABLET ORAL
COMMUNITY
End: 2021-04-27 | Stop reason: ALTCHOICE

## 2019-01-08 RX ORDER — FLUCONAZOLE 150 MG/1
150 TABLET ORAL DAILY
Qty: 1 TABLET | Refills: 0 | Status: SHIPPED | OUTPATIENT
Start: 2019-01-08 | End: 2019-01-09

## 2019-01-08 NOTE — PROGRESS NOTES
Subjective:       Patient ID: Brenna Soni is a 43 y.o. female    Chief Complaint: URI (symptoms started on dec 27. Went to urgent care got steroid shot, cortisone, inhaler.Not working at all. Now having constipation, yeast infection (yesterday). Head pressure, mucus on chest, having coughing attack. weakness)    HPI  The patient presents today complaining of cough, nasal congestion, sinus pressure and ear pressure that began on 12/27/2018.   She was seen at urgent care on 01/02/2019 and started on a 7 day course of Doxycyline on 1/2/18, she got a steroid shot and she got prednisone BID X 5 days, she also got an albuterol inhaler that she does not feel is giving her any relief.  Also she reports vaginal itching and discharge that began 2 days ago.    Review of Systems   Constitutional: Negative for chills and fever.   HENT: Positive for congestion, ear pain, postnasal drip, sinus pressure, sinus pain and sore throat.    Eyes: Negative for visual disturbance.   Respiratory: Positive for cough. Negative for shortness of breath and wheezing.    Cardiovascular: Negative for chest pain.   Gastrointestinal: Negative for abdominal pain, diarrhea, nausea and vomiting.   Skin: Negative for rash.   Neurological: Negative for headaches.        Objective:   Physical Exam   Constitutional: She is oriented to person, place, and time. She appears well-developed and well-nourished.   HENT:   Head: Normocephalic and atraumatic.   Right Ear: External ear normal.   Left Ear: External ear normal.   Nose: Nose normal.   Mouth/Throat: Oropharynx is clear and moist. No oropharyngeal exudate.   Bilateral TMs show clear effusion   Eyes: Conjunctivae and EOM are normal. Pupils are equal, round, and reactive to light.   Neck: Normal range of motion. Neck supple. No thyromegaly present.   Cardiovascular: Normal rate, regular rhythm, normal heart sounds and intact distal pulses. Exam reveals no gallop and no friction rub.   No murmur  heard.  Pulmonary/Chest: Effort normal and breath sounds normal. No respiratory distress. She has no wheezes. She has no rales.   Rhonchi heard bilaterally, worse on the left   Musculoskeletal: Normal range of motion. She exhibits no edema.   Lymphadenopathy:     She has no cervical adenopathy.   Neurological: She is alert and oriented to person, place, and time. She has normal reflexes.   Skin: Skin is warm and dry. No rash noted.   Psychiatric: She has a normal mood and affect. Her behavior is normal. Judgment and thought content normal.        Assessment:       1. Bronchitis  fluconazole (DIFLUCAN) 150 MG Tab    methylPREDNISolone (MEDROL DOSEPACK) 4 mg tablet    X-Ray Chest PA And Lateral    amoxicillin-clavulanate 875-125mg (AUGMENTIN) 875-125 mg per tablet   2. Vaginal candidiasis     3. Bilateral acute serous otitis media, recurrence not specified          Plan:       Bronchitis  -     methylPREDNISolone (MEDROL DOSEPACK) 4 mg tablet; use as directed  Dispense: 1 Package; Refill: 0  -     X-Ray Chest PA And Lateral; Future; Expected date: 01/08/2019  -     amoxicillin-clavulanate 875-125mg (AUGMENTIN) 875-125 mg per tablet; Take 1 tablet by mouth every 12 (twelve) hours. for 10 days  Dispense: 20 tablet; Refill: 0  - message sent to Dr. Hill requesting Cheratussin   - I recommended Robitussin or Delsym for daytime cough    Vaginal candidiasis  -     fluconazole (DIFLUCAN) 150 MG Tab; Take 1 tablet (150 mg total) by mouth once daily. for 1 day  Dispense: 1 tablet; Refill: 0    Bilateral acute serous otitis media, recurrence not specified  -     methylPREDNISolone (MEDROL DOSEPACK) 4 mg tablet; use as directed  Dispense: 1 Package; Refill: 0

## 2019-01-08 NOTE — TELEPHONE ENCOUNTER
Wily Hill, I saw your patient today for Bronchitis.  She has been taking Promethazine DM without relief of night time coughing.  Can you please sign.

## 2019-03-22 ENCOUNTER — HOSPITAL ENCOUNTER (OUTPATIENT)
Dept: RADIOLOGY | Facility: HOSPITAL | Age: 44
Discharge: HOME | End: 2019-03-22
Attending: PHYSICIAN ASSISTANT
Payer: COMMERCIAL

## 2019-03-22 ENCOUNTER — OFFICE VISIT (OUTPATIENT)
Dept: FAMILY MEDICINE | Facility: CLINIC | Age: 44
End: 2019-03-22
Payer: COMMERCIAL

## 2019-03-22 VITALS
WEIGHT: 135.56 LBS | RESPIRATION RATE: 18 BRPM | HEIGHT: 64 IN | BODY MASS INDEX: 23.14 KG/M2 | OXYGEN SATURATION: 96 % | SYSTOLIC BLOOD PRESSURE: 96 MMHG | HEART RATE: 68 BPM | DIASTOLIC BLOOD PRESSURE: 62 MMHG | TEMPERATURE: 98 F

## 2019-03-22 DIAGNOSIS — M25.552 PAIN OF BOTH HIP JOINTS: ICD-10-CM

## 2019-03-22 DIAGNOSIS — F43.0 STRESS REACTION: ICD-10-CM

## 2019-03-22 DIAGNOSIS — Z00.00 PERIODIC HEALTH ASSESSMENT, GENERAL SCREENING, ADULT: ICD-10-CM

## 2019-03-22 DIAGNOSIS — M54.50 MIDLINE LOW BACK PAIN WITHOUT SCIATICA, UNSPECIFIED CHRONICITY: ICD-10-CM

## 2019-03-22 DIAGNOSIS — R53.83 OTHER FATIGUE: Primary | ICD-10-CM

## 2019-03-22 DIAGNOSIS — E03.1 CONGENITAL HYPOTHYROIDISM WITHOUT GOITER: Chronic | ICD-10-CM

## 2019-03-22 DIAGNOSIS — M25.551 PAIN OF BOTH HIP JOINTS: ICD-10-CM

## 2019-03-22 DIAGNOSIS — N95.1 PERIMENOPAUSAL SYMPTOMS: ICD-10-CM

## 2019-03-22 PROCEDURE — 73521 X-RAY EXAM HIPS BI 2 VIEWS: CPT | Mod: TC,FY,PO

## 2019-03-22 PROCEDURE — 99999 PR PBB SHADOW E&M-EST. PATIENT-LVL IV: CPT | Mod: PBBFAC,,, | Performed by: PHYSICIAN ASSISTANT

## 2019-03-22 PROCEDURE — 72110 X-RAY EXAM L-2 SPINE 4/>VWS: CPT | Mod: 26,,, | Performed by: RADIOLOGY

## 2019-03-22 PROCEDURE — 99999 PR PBB SHADOW E&M-EST. PATIENT-LVL IV: ICD-10-PCS | Mod: PBBFAC,,, | Performed by: PHYSICIAN ASSISTANT

## 2019-03-22 PROCEDURE — 99214 PR OFFICE/OUTPT VISIT, EST, LEVL IV, 30-39 MIN: ICD-10-PCS | Mod: S$GLB,,, | Performed by: PHYSICIAN ASSISTANT

## 2019-03-22 PROCEDURE — 72110 X-RAY EXAM L-2 SPINE 4/>VWS: CPT | Mod: TC,FY,PO

## 2019-03-22 PROCEDURE — 3008F BODY MASS INDEX DOCD: CPT | Mod: CPTII,S$GLB,, | Performed by: PHYSICIAN ASSISTANT

## 2019-03-22 PROCEDURE — 99214 OFFICE O/P EST MOD 30 MIN: CPT | Mod: S$GLB,,, | Performed by: PHYSICIAN ASSISTANT

## 2019-03-22 PROCEDURE — 73521 X-RAY EXAM HIPS BI 2 VIEWS: CPT | Mod: 26,,, | Performed by: RADIOLOGY

## 2019-03-22 PROCEDURE — 3008F PR BODY MASS INDEX (BMI) DOCUMENTED: ICD-10-PCS | Mod: CPTII,S$GLB,, | Performed by: PHYSICIAN ASSISTANT

## 2019-03-22 PROCEDURE — 72110 XR LUMBAR SPINE COMPLETE 5 VIEW: ICD-10-PCS | Mod: 26,,, | Performed by: RADIOLOGY

## 2019-03-22 PROCEDURE — 73521 XR HIPS BILATERAL 2 VIEW INCL AP PELVIS: ICD-10-PCS | Mod: 26,,, | Performed by: RADIOLOGY

## 2019-03-22 RX ORDER — TESTOSTERONE CYPIONATE 200 MG/ML
0.25 INJECTION, SOLUTION INTRAMUSCULAR
Refills: 0 | COMMUNITY
Start: 2019-02-26 | End: 2019-10-17

## 2019-03-22 NOTE — PROGRESS NOTES
Subjective:       Patient ID: Brenna Soni is a 43 y.o. female.    Chief Complaint: Annual Exam (Patient here for annual exam)    HPI   Fatigue x 2 or 3 mos  Small lump in thyroid area  Weight loss 10 lbs over 2 mos  Temp changes hot and cold feeling  R hip discomfort and stiffness  Sees GYN and taking estrogen and Testerone inj.  Irregular mensis    Stress level 9   Pt under a lot of stress  Pt is adopted with almost no Hx  Pt. Requests full annual exam  Review of Systems   Constitutional: Positive for activity change, appetite change and fatigue. Negative for chills, diaphoresis, fever and unexpected weight change.   HENT: Negative.  Negative for sore throat and trouble swallowing.    Eyes: Negative.    Respiratory: Negative.    Cardiovascular: Negative.  Negative for chest pain and leg swelling.   Gastrointestinal: Negative.    Endocrine: Positive for cold intolerance and heat intolerance. Negative for polydipsia, polyphagia and polyuria.   Genitourinary: Positive for menstrual problem.   Musculoskeletal: Positive for arthralgias, back pain, gait problem and myalgias. Negative for joint swelling, neck pain and neck stiffness.   Skin: Negative.  Negative for rash.   Psychiatric/Behavioral: Positive for sleep disturbance. Negative for agitation, behavioral problems, confusion, decreased concentration, dysphoric mood, hallucinations, self-injury and suicidal ideas. The patient is nervous/anxious. The patient is not hyperactive.        Objective:      Physical Exam   Constitutional: She is oriented to person, place, and time. She appears well-developed and well-nourished. No distress.   HENT:   Head: Normocephalic and atraumatic.   Right Ear: External ear normal.   Left Ear: External ear normal.   Nose: Nose normal.   Mouth/Throat: Oropharynx is clear and moist. No oropharyngeal exudate.   Sinuses non tender  Mucus clear   Eyes: Conjunctivae are normal. No scleral icterus.   Neck: Normal range of motion.  Neck supple. No tracheal deviation present. No thyromegaly present.   Small 2 mm non tender sub Q soft tissue nodule over L thyroid area   Cardiovascular: Normal rate, regular rhythm, normal heart sounds and intact distal pulses. Exam reveals no gallop and no friction rub.   No murmur heard.  Pulmonary/Chest: Effort normal and breath sounds normal. No stridor. No respiratory distress. She has no wheezes. She has no rales.   Abdominal: Soft. Bowel sounds are normal. She exhibits no distension and no mass. There is no tenderness. There is no rebound and no guarding.   Musculoskeletal: She exhibits tenderness. She exhibits no edema.   Focal tenderness over L3 vertebrae area  No muscle tightness noted  No sciatic tenderness  FROM hips  Stiffness and mild discomfort R hip    Lymphadenopathy:     She has no cervical adenopathy.   Neurological: She is alert and oriented to person, place, and time. No sensory deficit. She exhibits normal muscle tone. Coordination normal.   Skin: Skin is warm and dry. No rash noted.   Psychiatric: She has a normal mood and affect. Her behavior is normal. Judgment and thought content normal.   Vitals reviewed.      Assessment:       1. Other fatigue    2. Pain of both hip joints    3. Midline low back pain without sciatica, unspecified chronicity    4. Stress reaction    5. Congenital hypothyroidism without goiter    6. Periodic health assessment, general screening, adult    7. Perimenopausal symptoms        Plan:       Other fatigue  -     Comprehensive metabolic panel; Future; Expected date: 03/22/2019  -     Lipid panel; Future; Expected date: 03/22/2019  -     T3; Future; Expected date: 03/22/2019  -     T4, free; Future; Expected date: 03/22/2019  -     TSH; Future; Expected date: 03/22/2019  -     Vitamin D; Future; Expected date: 03/22/2019  -     Magnesium; Future; Expected date: 03/22/2019  -     Follicle stimulating hormone; Future; Expected date: 03/22/2019  -     C-reactive  protein; Future; Expected date: 03/22/2019  -     Sedimentation rate; Future; Expected date: 03/22/2019  -     CBC auto differential; Future; Expected date: 03/22/2019  -     MILDRED Screen w/Reflex; Future; Expected date: 03/22/2019  -     Urinalysis; Future; Expected date: 03/22/2019    Pain of both hip joints  -     Comprehensive metabolic panel; Future; Expected date: 03/22/2019  -     Lipid panel; Future; Expected date: 03/22/2019  -     T3; Future; Expected date: 03/22/2019  -     T4, free; Future; Expected date: 03/22/2019  -     TSH; Future; Expected date: 03/22/2019  -     Vitamin D; Future; Expected date: 03/22/2019  -     Magnesium; Future; Expected date: 03/22/2019  -     Follicle stimulating hormone; Future; Expected date: 03/22/2019  -     C-reactive protein; Future; Expected date: 03/22/2019  -     Sedimentation rate; Future; Expected date: 03/22/2019  -     CBC auto differential; Future; Expected date: 03/22/2019  -     MILDRED Screen w/Reflex; Future; Expected date: 03/22/2019  -     Urinalysis; Future; Expected date: 03/22/2019  -     X-Ray Lumbar Spine Complete 5 View; Future; Expected date: 03/22/2019  -     X-Ray Hips Bilateral 2 View Incl AP Pelvis; Future; Expected date: 03/22/2019    Midline low back pain without sciatica, unspecified chronicity  -     Comprehensive metabolic panel; Future; Expected date: 03/22/2019  -     Lipid panel; Future; Expected date: 03/22/2019  -     T3; Future; Expected date: 03/22/2019  -     T4, free; Future; Expected date: 03/22/2019  -     TSH; Future; Expected date: 03/22/2019  -     Vitamin D; Future; Expected date: 03/22/2019  -     Magnesium; Future; Expected date: 03/22/2019  -     Follicle stimulating hormone; Future; Expected date: 03/22/2019  -     C-reactive protein; Future; Expected date: 03/22/2019  -     Sedimentation rate; Future; Expected date: 03/22/2019  -     CBC auto differential; Future; Expected date: 03/22/2019  -     MILDRED Screen w/Reflex; Future;  Expected date: 03/22/2019  -     Urinalysis; Future; Expected date: 03/22/2019  -     X-Ray Lumbar Spine Complete 5 View; Future; Expected date: 03/22/2019  -     X-Ray Hips Bilateral 2 View Incl AP Pelvis; Future; Expected date: 03/22/2019    Stress reaction  -     Comprehensive metabolic panel; Future; Expected date: 03/22/2019  -     Lipid panel; Future; Expected date: 03/22/2019  -     T3; Future; Expected date: 03/22/2019  -     T4, free; Future; Expected date: 03/22/2019  -     TSH; Future; Expected date: 03/22/2019  -     Vitamin D; Future; Expected date: 03/22/2019  -     Magnesium; Future; Expected date: 03/22/2019  -     Follicle stimulating hormone; Future; Expected date: 03/22/2019  -     C-reactive protein; Future; Expected date: 03/22/2019  -     Sedimentation rate; Future; Expected date: 03/22/2019  -     CBC auto differential; Future; Expected date: 03/22/2019  -     MILDRED Screen w/Reflex; Future; Expected date: 03/22/2019  -     Urinalysis; Future; Expected date: 03/22/2019    Congenital hypothyroidism without goiter  -     Comprehensive metabolic panel; Future; Expected date: 03/22/2019  -     Lipid panel; Future; Expected date: 03/22/2019  -     T3; Future; Expected date: 03/22/2019  -     T4, free; Future; Expected date: 03/22/2019  -     TSH; Future; Expected date: 03/22/2019  -     Vitamin D; Future; Expected date: 03/22/2019  -     Magnesium; Future; Expected date: 03/22/2019  -     Follicle stimulating hormone; Future; Expected date: 03/22/2019  -     C-reactive protein; Future; Expected date: 03/22/2019  -     Sedimentation rate; Future; Expected date: 03/22/2019  -     CBC auto differential; Future; Expected date: 03/22/2019  -     MILDRED Screen w/Reflex; Future; Expected date: 03/22/2019  -     Urinalysis; Future; Expected date: 03/22/2019    Periodic health assessment, general screening, adult  -     Comprehensive metabolic panel; Future; Expected date: 03/22/2019  -     Lipid panel; Future;  Expected date: 03/22/2019  -     T3; Future; Expected date: 03/22/2019  -     T4, free; Future; Expected date: 03/22/2019  -     TSH; Future; Expected date: 03/22/2019  -     Vitamin D; Future; Expected date: 03/22/2019  -     Magnesium; Future; Expected date: 03/22/2019  -     Follicle stimulating hormone; Future; Expected date: 03/22/2019  -     C-reactive protein; Future; Expected date: 03/22/2019  -     Sedimentation rate; Future; Expected date: 03/22/2019  -     CBC auto differential; Future; Expected date: 03/22/2019  -     MILDRED Screen w/Reflex; Future; Expected date: 03/22/2019  -     Urinalysis; Future; Expected date: 03/22/2019    Perimenopausal symptoms  -     Comprehensive metabolic panel; Future; Expected date: 03/22/2019  -     Lipid panel; Future; Expected date: 03/22/2019  -     T3; Future; Expected date: 03/22/2019  -     T4, free; Future; Expected date: 03/22/2019  -     TSH; Future; Expected date: 03/22/2019  -     Vitamin D; Future; Expected date: 03/22/2019  -     Magnesium; Future; Expected date: 03/22/2019  -     Follicle stimulating hormone; Future; Expected date: 03/22/2019  -     C-reactive protein; Future; Expected date: 03/22/2019  -     Sedimentation rate; Future; Expected date: 03/22/2019  -     CBC auto differential; Future; Expected date: 03/22/2019  -     MILDRED Screen w/Reflex; Future; Expected date: 03/22/2019  -     Urinalysis; Future; Expected date: 03/22/2019    DC shoes with heels  Discussed otc's  Discussed starting SSRI (Celexa )  Pt. Will consider  F/u in next 1 or 2 wks  Pt. Will f/u with her GYN in near future

## 2019-03-25 ENCOUNTER — TELEPHONE (OUTPATIENT)
Dept: FAMILY MEDICINE | Facility: CLINIC | Age: 44
End: 2019-03-25

## 2019-03-27 NOTE — TELEPHONE ENCOUNTER
Larry Mcneal ordered labs & xray on 3/22   I was asking for the results because he will be out of clinic until 3/28   Thank you

## 2019-04-09 DIAGNOSIS — M54.50 CHRONIC BILATERAL LOW BACK PAIN WITHOUT SCIATICA: ICD-10-CM

## 2019-04-09 DIAGNOSIS — G89.29 CHRONIC BILATERAL LOW BACK PAIN WITHOUT SCIATICA: ICD-10-CM

## 2019-04-10 RX ORDER — CELECOXIB 200 MG/1
CAPSULE ORAL
Qty: 60 CAPSULE | Refills: 0 | Status: SHIPPED | OUTPATIENT
Start: 2019-04-10 | End: 2020-07-17

## 2019-05-09 ENCOUNTER — TELEPHONE (OUTPATIENT)
Dept: ORTHOPEDICS | Facility: CLINIC | Age: 44
End: 2019-05-09

## 2019-05-09 NOTE — TELEPHONE ENCOUNTER
Patient stated she would like to be seen for bilateral shoulder pain. Confirmed appt date/time. Understanding verbalized    ----- Message from Lawson Resendiz sent at 5/9/2019  3:03 PM CDT -----  Contact: Patient  Type:  Patient Returning Call    Khushboo called this morning with questions  Best Call Back Number:  [phone]  Additional Information:  Please try again

## 2019-05-10 ENCOUNTER — OFFICE VISIT (OUTPATIENT)
Dept: ORTHOPEDICS | Facility: CLINIC | Age: 44
End: 2019-05-10
Payer: COMMERCIAL

## 2019-05-10 VITALS — HEIGHT: 64 IN | WEIGHT: 133 LBS | BODY MASS INDEX: 22.71 KG/M2

## 2019-05-10 DIAGNOSIS — G89.29 CHRONIC RIGHT SHOULDER PAIN: Primary | ICD-10-CM

## 2019-05-10 DIAGNOSIS — M16.11 PRIMARY OSTEOARTHRITIS OF RIGHT HIP: ICD-10-CM

## 2019-05-10 DIAGNOSIS — M25.551 RIGHT HIP PAIN: ICD-10-CM

## 2019-05-10 DIAGNOSIS — M75.81 ROTATOR CUFF TENDONITIS, RIGHT: ICD-10-CM

## 2019-05-10 DIAGNOSIS — M25.511 CHRONIC RIGHT SHOULDER PAIN: Primary | ICD-10-CM

## 2019-05-10 PROCEDURE — 20610 LARGE JOINT ASPIRATION/INJECTION: R SUBACROMIAL BURSA: ICD-10-PCS | Mod: RT,S$GLB,, | Performed by: ORTHOPAEDIC SURGERY

## 2019-05-10 PROCEDURE — 3008F PR BODY MASS INDEX (BMI) DOCUMENTED: ICD-10-PCS | Mod: CPTII,S$GLB,, | Performed by: ORTHOPAEDIC SURGERY

## 2019-05-10 PROCEDURE — 99999 PR PBB SHADOW E&M-EST. PATIENT-LVL III: ICD-10-PCS | Mod: PBBFAC,,, | Performed by: ORTHOPAEDIC SURGERY

## 2019-05-10 PROCEDURE — 99214 PR OFFICE/OUTPT VISIT, EST, LEVL IV, 30-39 MIN: ICD-10-PCS | Mod: 25,S$GLB,, | Performed by: ORTHOPAEDIC SURGERY

## 2019-05-10 PROCEDURE — 20610 DRAIN/INJ JOINT/BURSA W/O US: CPT | Mod: RT,S$GLB,, | Performed by: ORTHOPAEDIC SURGERY

## 2019-05-10 PROCEDURE — 99999 PR PBB SHADOW E&M-EST. PATIENT-LVL III: CPT | Mod: PBBFAC,,, | Performed by: ORTHOPAEDIC SURGERY

## 2019-05-10 PROCEDURE — 99214 OFFICE O/P EST MOD 30 MIN: CPT | Mod: 25,S$GLB,, | Performed by: ORTHOPAEDIC SURGERY

## 2019-05-10 PROCEDURE — 3008F BODY MASS INDEX DOCD: CPT | Mod: CPTII,S$GLB,, | Performed by: ORTHOPAEDIC SURGERY

## 2019-05-10 RX ORDER — TRIAMCINOLONE ACETONIDE 40 MG/ML
80 INJECTION, SUSPENSION INTRA-ARTICULAR; INTRAMUSCULAR
Status: DISCONTINUED | OUTPATIENT
Start: 2019-05-10 | End: 2019-05-10 | Stop reason: HOSPADM

## 2019-05-10 RX ADMIN — TRIAMCINOLONE ACETONIDE 80 MG: 40 INJECTION, SUSPENSION INTRA-ARTICULAR; INTRAMUSCULAR at 08:05

## 2019-05-10 NOTE — PROCEDURES
Large Joint Aspiration/Injection: R subacromial bursa  Date/Time: 5/10/2019 8:42 AM  Performed by: Jf Rodriguez MD  Authorized by: Jf Rodriguez MD     Consent Done?:  Yes (Verbal)  Indications:  Pain  Procedure site marked: Yes    Timeout: Prior to procedure the correct patient, procedure, and site was verified      Location:  Shoulder  Site:  R subacromial bursa  Prep: Patient was prepped and draped in usual sterile fashion    Ultrasonic Guidance for needle placement: No  Needle size:  21 G  Approach:  Posterior  Medications:  80 mg triamcinolone acetonide 40 mg/mL  Patient tolerance:  Patient tolerated the procedure well with no immediate complications

## 2019-05-10 NOTE — PROGRESS NOTES
Dictation #1  MRN:[id number]  CSN:[id number]  Further History  Aching pain  Worse with activity  Relieved with rest  No other associated symptoms  No other radiation    Further Exam  Alert and oriented  Pleasant  Contralateral limb has appropriate range of motion for age and condition  Contralateral limb has appropriate strength for age and condition  Contralateral limb has appropriate stability  for age and condition  No adenopathy  Pulses are appropriate for current condition  Skin is intact        Chief Complaint    Chief Complaint   Patient presents with    Right Shoulder - Pain    Right Hip - Pain       HPI  Brenna Soni is a 43 y.o.  female who presents with       Past Medical History  Past Medical History:   Diagnosis Date    Anal fissure     Hypothyroid 2013    Localized adiposity     trunk    Rectal bleeding     TBI (traumatic brain injury)     concussion, CT done.      Vertigo     Water retention     takes diuretic       Past Surgical History  Past Surgical History:   Procedure Laterality Date    ABDOMINOPLASTY N/A 3/19/2015    Per[facility] at [facility]    AUGMENTATION OF BREAST Bilateral     Lift also    BREAST SURGERY      augmentation     SECTION      COSMETIC SURGERY  2015    tummy tuck    gastric sleeve      laparoscopic abdominal      times 2    Tummy tuck      uterine ablation         Medications  Current Outpatient Medications   Medication Sig    celecoxib (CELEBREX) 200 MG capsule TAKE 1 CAPSULE(200 MG) BY MOUTH EVERY DAY. MAY TAKE 2ND CAPSULE IF NEEDED    docusate sodium (COLACE) 100 MG capsule Colace 100 mg capsule   Take 2 capsules every day PRn by oral route in the evening for 30 days.    ibuprofen (ADVIL,MOTRIN) 800 MG tablet Take 1 tablet (800 mg total) by mouth after meals as needed for Pain. Don't take with Celebrex    levothyroxine (SYNTHROID) 75 MCG tablet levothyroxine 75 mcg tablet   Take 1 tablet every day by  oral route.    senna (SENOKOT) 8.6 mg tablet Senna Lax 8.6 mg tablet   Take 2 tablets as needed by oral route.    testosterone cypionate (DEPOTESTOTERONE CYPIONATE) 200 mg/mL injection Inject 0.25 mLs into the muscle. Every other month     No current facility-administered medications for this visit.        Allergies  Review of patient's allergies indicates:  No Known Allergies    Family History  Family History   Adopted: Yes       Social History  Social History     Socioeconomic History    Marital status:      Spouse name: Not on file    Number of children: Not on file    Years of education: Not on file    Highest education level: Not on file   Occupational History     Employer: [employer]   Social Needs    Financial resource strain: Not on file    Food insecurity:     Worry: Not on file     Inability: Not on file    Transportation needs:     Medical: Not on file     Non-medical: Not on file   Tobacco Use    Smoking status: Never Smoker    Smokeless tobacco: Never Used   Substance and Sexual Activity    Alcohol use: Yes     Comment: 2-3 glasses of wine/week    Drug use: No    Sexual activity: Not on file   Lifestyle    Physical activity:     Days per week: Not on file     Minutes per session: Not on file    Stress: Not on file   Relationships    Social connections:     Talks on phone: Not on file     Gets together: Not on file     Attends [employer] service: Not on file     Active member of club or organization: Not on file     Attends meetings of clubs or organizations: Not on file     Relationship status: Not on file   Other Topics Concern    Not on file   Social History Narrative    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               Review of Systems     Constitutional: Negative    HENT: Negative  Eyes: Negative  Respiratory: Negative  Cardiovascular: Negative  Musculoskeletal: HPI  Skin: Negative  Neurological: Negative  Hematological: Negative  Endocrine:  Negative                 Physical Exam    There were no vitals filed for this visit.  Body mass index is 22.83 kg/m².  Physical Examination:     General appearance -  well appearing, and in no distress  Mental status - awake  Neck - supple  Chest -  symmetric air entry  Heart - normal rate   Abdomen - soft      Assessment     1. Chronic right shoulder pain    2. Rotator cuff tendonitis, right    3. Right hip pain    4. Primary osteoarthritis of right hip          Plan

## 2019-05-11 NOTE — PROGRESS NOTES
SUBJECTIVE:  [age] years old, recurrent pain in the right shoulder.  We   had given her injection, in which she responded very well to.  Having pain with   overhead activity, reaching behind her back.  Also has some limited range of   motion of the left shoulder.  All complains of pain in the right hip for a   couple months right at the region of greater trochanter.    PHYSICAL EXAMINATION:  MUSCULOSKELETAL:  Today shows that she has positive Neer and Jenkins impingement   sign, bilateral shoulders.  Cuff strength was weak and painful.  Cervical   motion does not reproduce her symptoms.  She is also very tender at the greater   trochanter on the right hip.  Hip range of motion is painless.    ASSESSMENT:  Impingement, right shoulder; cuff tendinitis.    PLAN:  Kenalog injection into the right shoulder.  She may return at a later   date for right hip trochanteric bursa injection or left shoulder subacromial   injection.      JAYLAN  dd: 05/10/2019 09:17:50 (CDT)  td: 05/11/2019 01:41:17 (CDT)  Doc ID   #[id number]  Job ID #[id number]    CC:

## 2019-05-24 ENCOUNTER — OFFICE VISIT (OUTPATIENT)
Dept: ORTHOPEDICS | Facility: CLINIC | Age: 44
End: 2019-05-24
Payer: COMMERCIAL

## 2019-05-24 VITALS — WEIGHT: 132.94 LBS | HEIGHT: 64 IN | BODY MASS INDEX: 22.7 KG/M2

## 2019-05-24 DIAGNOSIS — M25.511 CHRONIC RIGHT SHOULDER PAIN: Primary | ICD-10-CM

## 2019-05-24 DIAGNOSIS — G89.29 CHRONIC RIGHT SHOULDER PAIN: Primary | ICD-10-CM

## 2019-05-24 DIAGNOSIS — M75.81 ROTATOR CUFF TENDONITIS, RIGHT: ICD-10-CM

## 2019-05-24 DIAGNOSIS — M75.42 IMPINGEMENT SYNDROME OF LEFT SHOULDER: ICD-10-CM

## 2019-05-24 PROCEDURE — 3008F BODY MASS INDEX DOCD: CPT | Mod: CPTII,S$GLB,, | Performed by: ORTHOPAEDIC SURGERY

## 2019-05-24 PROCEDURE — 3008F PR BODY MASS INDEX (BMI) DOCUMENTED: ICD-10-PCS | Mod: CPTII,S$GLB,, | Performed by: ORTHOPAEDIC SURGERY

## 2019-05-24 PROCEDURE — 99214 PR OFFICE/OUTPT VISIT, EST, LEVL IV, 30-39 MIN: ICD-10-PCS | Mod: 25,S$GLB,, | Performed by: ORTHOPAEDIC SURGERY

## 2019-05-24 PROCEDURE — 99214 OFFICE O/P EST MOD 30 MIN: CPT | Mod: 25,S$GLB,, | Performed by: ORTHOPAEDIC SURGERY

## 2019-05-24 PROCEDURE — 99999 PR PBB SHADOW E&M-EST. PATIENT-LVL II: CPT | Mod: PBBFAC,,, | Performed by: ORTHOPAEDIC SURGERY

## 2019-05-24 PROCEDURE — 20610 DRAIN/INJ JOINT/BURSA W/O US: CPT | Mod: LT,S$GLB,, | Performed by: ORTHOPAEDIC SURGERY

## 2019-05-24 PROCEDURE — 99999 PR PBB SHADOW E&M-EST. PATIENT-LVL II: ICD-10-PCS | Mod: PBBFAC,,, | Performed by: ORTHOPAEDIC SURGERY

## 2019-05-24 PROCEDURE — 20610 LARGE JOINT ASPIRATION/INJECTION: L SUBACROMIAL BURSA: ICD-10-PCS | Mod: LT,S$GLB,, | Performed by: ORTHOPAEDIC SURGERY

## 2019-05-24 RX ORDER — TRIAMCINOLONE ACETONIDE 40 MG/ML
80 INJECTION, SUSPENSION INTRA-ARTICULAR; INTRAMUSCULAR
Status: DISCONTINUED | OUTPATIENT
Start: 2019-05-24 | End: 2019-05-24 | Stop reason: HOSPADM

## 2019-05-24 RX ADMIN — TRIAMCINOLONE ACETONIDE 80 MG: 40 INJECTION, SUSPENSION INTRA-ARTICULAR; INTRAMUSCULAR at 08:05

## 2019-05-24 NOTE — PROGRESS NOTES
A 43 years old did well to cortisone shot into the right shoulder, requesting   injected today into her left shoulder as she has similar symptoms.    Exam shows positive Neer and Jenkins impingement sign.  Cuff strength weak and   painful.    ASSESSMENT:  Subacromial bursitis, cuff tendinitis, left shoulder.    PLAN:  Kenalog injection to the left shoulder.  Continue home exercise program.    Follow up as needed.      PBB/HN  dd: 2019 08:32:01 (CDT)  td: 2019 22:25:40 (CDT)  Doc ID   #[id number]  Job ID #[id number]    CC:     Further History  Aching pain  Worse with activity  Relieved with rest  No other associated symptoms  No other radiation    Further Exam  Alert and oriented  Pleasant  Contralateral limb has appropriate range of motion for age and condition  Contralateral limb has appropriate strength for age and condition  Contralateral limb has appropriate stability  for age and condition  No adenopathy  Pulses are appropriate for current condition  Skin is intact        Chief Complaint    Chief Complaint   Patient presents with    Left Shoulder - Pain       HPI  Brenna Soni is a 43 y.o.  female who presents with       Past Medical History  Past Medical History:   Diagnosis Date    Anal fissure     Hypothyroid 2013    Localized adiposity     trunk    Rectal bleeding     TBI (traumatic brain injury)     concussion, CT done.      Vertigo     Water retention     takes diuretic       Past Surgical History  Past Surgical History:   Procedure Laterality Date    ABDOMINOPLASTY N/A 3/19/2015    [facility]    AUGMENTATION OF BREAST Bilateral     Lift also    BREAST SURGERY      augmentation     SECTION      COSMETIC SURGERY  2015    tummy tuck    gastric sleeve      laparoscopic abdominal      times 2    Tummy tuck      uterine ablation         Medications  Current Outpatient Medications   Medication Sig    celecoxib  (CELEBREX) 200 MG capsule TAKE 1 CAPSULE(200 MG) BY MOUTH EVERY DAY. MAY TAKE 2ND CAPSULE IF NEEDED    docusate sodium (COLACE) 100 MG capsule Colace 100 mg capsule   Take 2 capsules every day PRn by oral route in the evening for 30 days.    ibuprofen (ADVIL,MOTRIN) 800 MG tablet Take 1 tablet (800 mg total) by mouth after meals as needed for Pain. Don't take with Celebrex    levothyroxine (SYNTHROID) 75 MCG tablet levothyroxine 75 mcg tablet   Take 1 tablet every day by oral route.    senna (SENOKOT) 8.6 mg tablet Senna Lax 8.6 mg tablet   Take 2 tablets as needed by oral route.    testosterone cypionate (DEPOTESTOTERONE CYPIONATE) 200 mg/mL injection Inject 0.25 mLs into the muscle. Every other month     No current facility-administered medications for this visit.        Allergies  Review of patient's allergies indicates:  No Known Allergies    Family History  Family History   Adopted: Yes       Social History  Social History     Socioeconomic History    Marital status:      Spouse name: Not on file    Number of children: Not on file    Years of education: Not on file    Highest education level: Not on file   Occupational History     Employer: [employer]   Social Needs    Financial resource strain: Not on file    Food insecurity:     Worry: Not on file     Inability: Not on file    Transportation needs:     Medical: Not on file     Non-medical: Not on file   Tobacco Use    Smoking status: Never Smoker    Smokeless tobacco: Never Used   Substance and Sexual Activity    Alcohol use: Yes     Comment: 2-3 glasses of wine/week    Drug use: No    Sexual activity: Not on file   Lifestyle    Physical activity:     Days per week: Not on file     Minutes per session: Not on file    Stress: Not on file   Relationships    Social connections:     Talks on phone: Not on file     Gets together: Not on file     Attends Yazidism service: Not on file     Active member of club or  organization: Not on file     Attends meetings of clubs or organizations: Not on file     Relationship status: Not on file   Other Topics Concern    Not on file   Social History Narrative    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               Review of Systems     Constitutional: Negative    HENT: Negative  Eyes: Negative  Respiratory: Negative  Cardiovascular: Negative  Musculoskeletal: HPI  Skin: Negative  Neurological: Negative  Hematological: Negative  Endocrine: Negative                 Physical Exam    There were no vitals filed for this visit.  Body mass index is 22.82 kg/m².  Physical Examination:     General appearance -  well appearing, and in no distress  Mental status - awake  Neck - supple  Chest -  symmetric air entry  Heart - normal rate   Abdomen - soft      Assessment     1. Chronic right shoulder pain    2. Rotator cuff tendonitis, right    3. Impingement syndrome of left shoulder          Plan

## 2019-05-24 NOTE — PROCEDURES
Large Joint Aspiration/Injection: L subacromial bursa  Date/Time: 5/24/2019 8:38 AM  Performed by: Jf Rodriguez MD  Authorized by: Jf Rodriguez MD     Consent Done?:  Yes (Verbal)  Indications:  Pain  Procedure site marked: Yes    Timeout: Prior to procedure the correct patient, procedure, and site was verified      Location:  Shoulder  Site:  L subacromial bursa  Prep: Patient was prepped and draped in usual sterile fashion    Ultrasonic Guidance for needle placement: No  Needle size:  21 G  Approach:  Posterior  Medications:  80 mg triamcinolone acetonide 40 mg/mL  Patient tolerance:  Patient tolerated the procedure well with no immediate complications

## 2019-05-29 ENCOUNTER — OFFICE VISIT (OUTPATIENT)
Dept: FAMILY MEDICINE | Facility: CLINIC | Age: 44
End: 2019-05-29
Payer: COMMERCIAL

## 2019-05-29 ENCOUNTER — HOSPITAL ENCOUNTER (OUTPATIENT)
Dept: RADIOLOGY | Facility: HOSPITAL | Age: 44
Discharge: HOME | End: 2019-05-29
Attending: PHYSICIAN ASSISTANT
Payer: COMMERCIAL

## 2019-05-29 VITALS
HEIGHT: 64 IN | BODY MASS INDEX: 23.34 KG/M2 | HEART RATE: 61 BPM | OXYGEN SATURATION: 97 % | SYSTOLIC BLOOD PRESSURE: 106 MMHG | WEIGHT: 136.69 LBS | DIASTOLIC BLOOD PRESSURE: 76 MMHG

## 2019-05-29 DIAGNOSIS — M79.662 PAIN OF LEFT CALF: Primary | ICD-10-CM

## 2019-05-29 DIAGNOSIS — M79.662 PAIN OF LEFT CALF: ICD-10-CM

## 2019-05-29 PROCEDURE — 99214 PR OFFICE/OUTPT VISIT, EST, LEVL IV, 30-39 MIN: ICD-10-PCS | Mod: S$GLB,,, | Performed by: PHYSICIAN ASSISTANT

## 2019-05-29 PROCEDURE — 93970 US LOWER EXTREMITY VEINS BILATERAL: ICD-10-PCS | Mod: 26,,, | Performed by: RADIOLOGY

## 2019-05-29 PROCEDURE — 99214 OFFICE O/P EST MOD 30 MIN: CPT | Mod: S$GLB,,, | Performed by: PHYSICIAN ASSISTANT

## 2019-05-29 PROCEDURE — 99999 PR PBB SHADOW E&M-EST. PATIENT-LVL III: CPT | Mod: PBBFAC,,, | Performed by: PHYSICIAN ASSISTANT

## 2019-05-29 PROCEDURE — 3008F BODY MASS INDEX DOCD: CPT | Mod: CPTII,S$GLB,, | Performed by: PHYSICIAN ASSISTANT

## 2019-05-29 PROCEDURE — 93970 EXTREMITY STUDY: CPT | Mod: TC,PO

## 2019-05-29 PROCEDURE — 99999 PR PBB SHADOW E&M-EST. PATIENT-LVL III: ICD-10-PCS | Mod: PBBFAC,,, | Performed by: PHYSICIAN ASSISTANT

## 2019-05-29 PROCEDURE — 3008F PR BODY MASS INDEX (BMI) DOCUMENTED: ICD-10-PCS | Mod: CPTII,S$GLB,, | Performed by: PHYSICIAN ASSISTANT

## 2019-05-29 PROCEDURE — 93970 EXTREMITY STUDY: CPT | Mod: 26,,, | Performed by: RADIOLOGY

## 2019-05-29 RX ORDER — ESTRADIOL 2 MG/1
TABLET ORAL
Refills: 5 | COMMUNITY
Start: 2019-05-16 | End: 2021-04-27 | Stop reason: ALTCHOICE

## 2019-05-29 RX ORDER — PROGESTERONE 100 MG/1
CAPSULE ORAL
Refills: 5 | COMMUNITY
Start: 2019-05-09 | End: 2021-04-27 | Stop reason: ALTCHOICE

## 2019-05-29 NOTE — MEDICAL/APP STUDENT
Subjective:       Patient ID: Brenna Soni is a 43 y.o. female.    Chief Complaint: Throbbing pain in left leg (x 10 days) and bruising and swelling    Ms. Soni presents today for left leg swelling and calf pain that has worsened over the past 10 days. She describes a constant ache and occasional throbbing of her left calf, and tenderness to the 4th and 5th metatarsals of the left foot. She states the pain is worse and the leg is more erythematous at night when she lays down. The discomfort has caused difficulty sleeping. She denies any recent trauma to the leg or foot, and denies any chest pain or SOB.    She was prescribed estradiol and progesterone on 05/16/19. She denies any other signs and symptoms of the medication.    HPI  Review of Systems   Constitutional: Positive for fatigue. Negative for activity change, appetite change and fever.   HENT: Negative for congestion, sinus pain and sneezing.    Eyes: Negative for pain and itching.   Respiratory: Negative for cough, chest tightness and shortness of breath.    Cardiovascular: Positive for leg swelling (Left leg swelling, worse at night). Negative for chest pain.   Gastrointestinal: Negative for abdominal pain, diarrhea, nausea and vomiting.   Genitourinary: Negative for difficulty urinating, dysuria and urgency.   Musculoskeletal: Positive for arthralgias (Left leg swelling).   Skin: Positive for color change (Erythema of leg worse at night). Negative for pallor.   Neurological: Negative for dizziness and headaches.       Objective:      Physical Exam   Constitutional: She is oriented to person, place, and time. She appears well-developed and well-nourished.   HENT:   Head: Normocephalic.   Eyes: Pupils are equal, round, and reactive to light.   Neck: Normal range of motion. No JVD present. No tracheal deviation present. No thyromegaly present.   Cardiovascular: Normal rate, regular rhythm and normal heart sounds.   Pulses:       Dorsalis pedis  pulses are 2+ on the right side, and 1+ on the left side.        Posterior tibial pulses are 2+ on the right side, and 1+ on the left side.   Pulmonary/Chest: Effort normal and breath sounds normal.   Musculoskeletal: Normal range of motion.   Neurological: She is alert and oriented to person, place, and time.   Skin: Skin is warm and dry. Capillary refill takes less than 2 seconds. There is erythema (Mild erythema of the left leg, she states the discoloration is worse at night.).        Psychiatric: She has a normal mood and affect. Her behavior is normal. Judgment and thought content normal.       Assessment:       1. Pain of left calf        Plan:       Pain of left calf  -     US Lower Extremity Veins Bilateral; Future; Expected date: 05/29/2019    - U/S of the bilateral lower extremities ordered to rule of DVT.

## 2019-05-29 NOTE — PROGRESS NOTES
Subjective:       Patient ID: Brenna Soni is a 43 y.o. female    Chief Complaint: Throbbing pain in left leg (x 10 days) and bruising and swelling    HPI  Ms. Soni presents today for left leg swelling and calf pain that has worsened over the past 10 days. She describes a constant ache and occasional throbbing of her left calf, and tenderness to the 4th and 5th metatarsals of the left foot. She states the pain is worse and the leg is more erythematous at night when she lays down. The discomfort has caused difficulty sleeping. She denies any recent trauma to the leg or foot, and denies any chest pain or SOB.     She was prescribed with estradiol and progesterone on 05/16/19. She denies any other signs and symptoms of the medication.    Review of Systems   Constitutional: Positive for fatigue. Negative for activity change, appetite change and fever.   HENT: Negative for congestion, sinus pain and sneezing.    Eyes: Negative for pain and itching.   Respiratory: Negative for cough, chest tightness and shortness of breath.    Cardiovascular: Positive for leg swelling (Left leg swelling, worse at night). Negative for chest pain.   Gastrointestinal: Negative for abdominal pain, diarrhea, nausea and vomiting.   Genitourinary: Negative for difficulty urinating, dysuria and urgency.   Musculoskeletal: Positive for arthralgias (Left leg swelling).   Skin: Positive for color change (Erythema of leg worse at night). Negative for pallor.   Neurological: Negative for dizziness and headaches.   Objective:   Physical Exam   Constitutional: She is oriented to person, place, and time. She appears well-developed and well-nourished.   HENT:   Head: Normocephalic.   Eyes: Pupils are equal, round, and reactive to light.   Neck: Normal range of motion. No JVD present. No tracheal deviation present. No thyromegaly present.   Cardiovascular: Normal rate, regular rhythm and normal heart sounds.   Pulses:       Dorsalis pedis  pulses are 2+ on the right side, and 1+ on the left side.        Posterior tibial pulses are 2+ on the right side, and 1+ on the left side.   Pulmonary/Chest: Effort normal and breath sounds normal.   Musculoskeletal: Normal range of motion.   Neurological: She is alert and oriented to person, place, and time.   Skin: Skin is warm and dry. Capillary refill takes less than 2 seconds. There is erythema (Mild erythema of the left leg, she states the discoloration is worse at night.) superficial veins on the left lower leg near the ankle are bulging.        Psychiatric: She has a normal mood and affect. Her behavior is normal. Judgment and thought content normal.         Assessment:       1. Pain of left calf  US Lower Extremity Veins Bilateral        Plan:       Pain of left calf  -     US Lower Extremity Veins Bilateral; Future; Expected date: 05/29/2019

## 2019-05-30 ENCOUNTER — PATIENT MESSAGE (OUTPATIENT)
Dept: FAMILY MEDICINE | Facility: CLINIC | Age: 44
End: 2019-05-30

## 2019-05-30 DIAGNOSIS — T14.8XXA BRUISING: Primary | ICD-10-CM

## 2019-06-05 ENCOUNTER — LAB VISIT (OUTPATIENT)
Dept: LAB | Facility: HOSPITAL | Age: 44
End: 2019-06-05
Attending: PHYSICIAN ASSISTANT
Payer: COMMERCIAL

## 2019-06-05 DIAGNOSIS — T14.8XXA BRUISING: ICD-10-CM

## 2019-06-05 LAB
ALBUMIN SERPL BCP-MCNC: 3.4 G/DL (ref 3.5–5.2)
ALP SERPL-CCNC: 45 U/L (ref 55–135)
ALT SERPL W/O P-5'-P-CCNC: 25 U/L (ref 10–44)
ANION GAP SERPL CALC-SCNC: 9 MMOL/L (ref 8–16)
APTT BLDCRRT: 24.2 SEC (ref 21–32)
AST SERPL-CCNC: 24 U/L (ref 10–40)
BASOPHILS # BLD AUTO: 0.06 K/UL (ref 0–0.2)
BASOPHILS NFR BLD: 0.6 % (ref 0–1.9)
BILIRUB SERPL-MCNC: 0.3 MG/DL (ref 0.1–1)
BUN SERPL-MCNC: 20 MG/DL (ref 6–20)
CALCIUM SERPL-MCNC: 9.6 MG/DL (ref 8.7–10.5)
CHLORIDE SERPL-SCNC: 103 MMOL/L (ref 95–110)
CO2 SERPL-SCNC: 28 MMOL/L (ref 23–29)
CREAT SERPL-MCNC: 0.8 MG/DL (ref 0.5–1.4)
DIFFERENTIAL METHOD: ABNORMAL
EOSINOPHIL # BLD AUTO: 0.1 K/UL (ref 0–0.5)
EOSINOPHIL NFR BLD: 0.8 % (ref 0–8)
ERYTHROCYTE [DISTWIDTH] IN BLOOD BY AUTOMATED COUNT: 14.6 % (ref 11.5–14.5)
EST. GFR  (AFRICAN AMERICAN): >60 ML/MIN/1.73 M^2
EST. GFR  (NON AFRICAN AMERICAN): >60 ML/MIN/1.73 M^2
GLUCOSE SERPL-MCNC: 93 MG/DL (ref 70–110)
HCT VFR BLD AUTO: 43.8 % (ref 37–48.5)
HGB BLD-MCNC: 14.3 G/DL (ref 12–16)
IMM GRANULOCYTES # BLD AUTO: 0.12 K/UL (ref 0–0.04)
IMM GRANULOCYTES NFR BLD AUTO: 1.2 % (ref 0–0.5)
INR PPP: 0.9 (ref 0.8–1.2)
LYMPHOCYTES # BLD AUTO: 1.7 K/UL (ref 1–4.8)
LYMPHOCYTES NFR BLD: 16.9 % (ref 18–48)
MCH RBC QN AUTO: 31.8 PG (ref 27–31)
MCHC RBC AUTO-ENTMCNC: 32.6 G/DL (ref 32–36)
MCV RBC AUTO: 97 FL (ref 82–98)
MONOCYTES # BLD AUTO: 0.9 K/UL (ref 0.3–1)
MONOCYTES NFR BLD: 8.7 % (ref 4–15)
NEUTROPHILS # BLD AUTO: 7.2 K/UL (ref 1.8–7.7)
NEUTROPHILS NFR BLD: 71.8 % (ref 38–73)
NRBC BLD-RTO: 0 /100 WBC
PLATELET # BLD AUTO: 255 K/UL (ref 150–350)
PMV BLD AUTO: 11.2 FL (ref 9.2–12.9)
POTASSIUM SERPL-SCNC: 4.1 MMOL/L (ref 3.5–5.1)
PROT SERPL-MCNC: 6.9 G/DL (ref 6–8.4)
PROTHROMBIN TIME: 9.4 SEC (ref 9–12.5)
RBC # BLD AUTO: 4.5 M/UL (ref 4–5.4)
SODIUM SERPL-SCNC: 140 MMOL/L (ref 136–145)
WBC # BLD AUTO: 10 K/UL (ref 3.9–12.7)

## 2019-06-05 PROCEDURE — 85610 PROTHROMBIN TIME: CPT | Mod: PO

## 2019-06-05 PROCEDURE — 80053 COMPREHEN METABOLIC PANEL: CPT

## 2019-06-05 PROCEDURE — 85730 THROMBOPLASTIN TIME PARTIAL: CPT | Mod: PO

## 2019-06-05 PROCEDURE — 85025 COMPLETE CBC W/AUTO DIFF WBC: CPT

## 2019-06-05 PROCEDURE — 36415 COLL VENOUS BLD VENIPUNCTURE: CPT | Mod: PO

## 2019-06-24 LAB
HPV APTIMA: NEGATIVE
PAP SMEAR: NORMAL

## 2019-06-28 DIAGNOSIS — E03.9 HYPOTHYROIDISM: ICD-10-CM

## 2019-06-28 RX ORDER — LEVOTHYROXINE SODIUM 75 UG/1
TABLET ORAL
Qty: 90 TABLET | Refills: 2 | Status: SHIPPED | OUTPATIENT
Start: 2019-06-28 | End: 2020-05-11 | Stop reason: SDUPTHER

## 2019-10-17 ENCOUNTER — TELEPHONE (OUTPATIENT)
Dept: ORTHOPEDICS | Facility: CLINIC | Age: 44
End: 2019-10-17

## 2019-10-17 ENCOUNTER — OFFICE VISIT (OUTPATIENT)
Dept: FAMILY MEDICINE | Facility: CLINIC | Age: 44
End: 2019-10-17
Payer: COMMERCIAL

## 2019-10-17 ENCOUNTER — HOSPITAL ENCOUNTER (OUTPATIENT)
Dept: RADIOLOGY | Facility: HOSPITAL | Age: 44
Discharge: HOME | End: 2019-10-17
Attending: NURSE PRACTITIONER
Payer: COMMERCIAL

## 2019-10-17 VITALS
HEIGHT: 64 IN | BODY MASS INDEX: 23.93 KG/M2 | OXYGEN SATURATION: 100 % | DIASTOLIC BLOOD PRESSURE: 60 MMHG | SYSTOLIC BLOOD PRESSURE: 114 MMHG | HEART RATE: 70 BPM | WEIGHT: 140.19 LBS

## 2019-10-17 DIAGNOSIS — M25.50 PAIN IN JOINT INVOLVING MULTIPLE SITES: ICD-10-CM

## 2019-10-17 DIAGNOSIS — R22.32 FINGER MASS, LEFT: Primary | ICD-10-CM

## 2019-10-17 DIAGNOSIS — R21 RASH AND NONSPECIFIC SKIN ERUPTION: ICD-10-CM

## 2019-10-17 DIAGNOSIS — R22.32 FINGER MASS, LEFT: ICD-10-CM

## 2019-10-17 PROCEDURE — 73130 X-RAY EXAM OF HAND: CPT | Mod: TC,FY,PO,LT

## 2019-10-17 PROCEDURE — 73130 XR HAND COMPLETE 3 VIEW LEFT: ICD-10-PCS | Mod: 26,LT,, | Performed by: RADIOLOGY

## 2019-10-17 PROCEDURE — 90686 FLU VACCINE (QUAD) GREATER THAN OR EQUAL TO 3YO PRESERVATIVE FREE IM: ICD-10-PCS | Mod: S$GLB,,, | Performed by: NURSE PRACTITIONER

## 2019-10-17 PROCEDURE — 90471 IMMUNIZATION ADMIN: CPT | Mod: S$GLB,,, | Performed by: NURSE PRACTITIONER

## 2019-10-17 PROCEDURE — 3008F BODY MASS INDEX DOCD: CPT | Mod: CPTII,S$GLB,, | Performed by: NURSE PRACTITIONER

## 2019-10-17 PROCEDURE — 99999 PR PBB SHADOW E&M-EST. PATIENT-LVL IV: ICD-10-PCS | Mod: PBBFAC,,, | Performed by: NURSE PRACTITIONER

## 2019-10-17 PROCEDURE — 99214 PR OFFICE/OUTPT VISIT, EST, LEVL IV, 30-39 MIN: ICD-10-PCS | Mod: 25,S$GLB,, | Performed by: NURSE PRACTITIONER

## 2019-10-17 PROCEDURE — 3008F PR BODY MASS INDEX (BMI) DOCUMENTED: ICD-10-PCS | Mod: CPTII,S$GLB,, | Performed by: NURSE PRACTITIONER

## 2019-10-17 PROCEDURE — 99214 OFFICE O/P EST MOD 30 MIN: CPT | Mod: 25,S$GLB,, | Performed by: NURSE PRACTITIONER

## 2019-10-17 PROCEDURE — 90686 IIV4 VACC NO PRSV 0.5 ML IM: CPT | Mod: S$GLB,,, | Performed by: NURSE PRACTITIONER

## 2019-10-17 PROCEDURE — 99999 PR PBB SHADOW E&M-EST. PATIENT-LVL IV: CPT | Mod: PBBFAC,,, | Performed by: NURSE PRACTITIONER

## 2019-10-17 PROCEDURE — 90471 FLU VACCINE (QUAD) GREATER THAN OR EQUAL TO 3YO PRESERVATIVE FREE IM: ICD-10-PCS | Mod: S$GLB,,, | Performed by: NURSE PRACTITIONER

## 2019-10-17 PROCEDURE — 73130 X-RAY EXAM OF HAND: CPT | Mod: 26,LT,, | Performed by: RADIOLOGY

## 2019-10-17 RX ORDER — CLOTRIMAZOLE AND BETAMETHASONE DIPROPIONATE 10; .64 MG/G; MG/G
CREAM TOPICAL 2 TIMES DAILY
Qty: 45 G | Refills: 0 | Status: SHIPPED | OUTPATIENT
Start: 2019-10-17 | End: 2020-07-17

## 2019-10-17 RX ORDER — MELOXICAM 15 MG/1
15 TABLET ORAL DAILY
Qty: 7 TABLET | Refills: 0 | Status: SHIPPED | OUTPATIENT
Start: 2019-10-17 | End: 2020-07-17

## 2019-10-17 NOTE — PROGRESS NOTES
Subjective:       Patient ID: Brenna Soni is a 44 y.o. female.    Chief Complaint: Rash and Mass    Knot in the distal joint of the fourth finger of the left hand, tenderness and tingling in the left ring finger x 2 weeks, increasing in size. Some other finger joints are starting to feel tender. No change in activity or job, no injury. Has not taken anything for the symptoms.   She says she has a linear area of discoloration and scaling on the right side of the umbilicus x 6 months, gradually extending. Just using moisturizer. Has not changed any skin care products.   She has noticed pain in multiple joints, she attributes this to menopause. Occasionally taking ibuprofen.   Past Medical History:  No date: Anal fissure  2013: Hypothyroid  No date: Localized adiposity      Comment:  trunk  No date: Rectal bleeding  : TBI (traumatic brain injury)      Comment:  concussion, CT done.    No date: Vertigo  No date: Water retention      Comment:  takes diuretic    Past Surgical History:  2012: AUGMENTATION OF BREAST; Bilateral      Comment:  Lift also  No date: BREAST SURGERY      Comment:  augmentation  No date:  SECTION  2015: COSMETIC SURGERY      Comment:  tummy tuck  No date: gastric sleeve  No date: laparoscopic abdominal      Comment:  times 2  No date: Tummy tuck  No date: uterine ablation    Review of patient's family history indicates:  Adopted:  Yes      Social History    Socioeconomic History      Marital status:       Spouse name: Not on file      Number of children: Not on file      Years of education: Not on file      Highest education level: Not on file    Occupational History        Employer: [employer]    Social Needs      Financial resource strain: Not hard at all      Food insecurity:        Worry: Never true        Inability: Never true      Transportation needs:        Medical: No        Non-medical: No    Tobacco Use      Smoking status: Never  Smoker      Smokeless tobacco: Never Used    Substance and Sexual Activity      Alcohol use: Yes        Frequency: Monthly or less        Drinks per session: 7 to 9        Binge frequency: Never        Comment: 2-3 glasses of wine/week      Drug use: No      Sexual activity: Not on file    Lifestyle      Physical activity:        Days per week: 4 days        Minutes per session: 40 min      Stress: To some extent    Relationships      Social connections:        Talks on phone: Twice a week        Gets together: Once a week        Attends Mormonism service: Not on file        Active member of club or organization: No        Attends meetings of clubs or organizations: Never        Relationship status:     Other Topics      Concerns:        Not on file    Social History Narrative      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      Current Outpatient Medications:  celecoxib (CELEBREX) 200 MG capsule, TAKE 1 CAPSULE(200 MG) BY MOUTH EVERY DAY. MAY TAKE 2ND CAPSULE IF NEEDED, Disp: 60 capsule, Rfl: 0  docusate sodium (COLACE) 100 MG capsule, Colace 100 mg capsule Take 2 capsules every day PRn by oral route in the evening for 30 days., Disp: , Rfl:   estradiol (ESTRACE) 2 MG tablet, TK 1 T PO QD, Disp: , Rfl: 5  ibuprofen (ADVIL,MOTRIN) 800 MG tablet, Take 1 tablet (800 mg total) by mouth after meals as needed for Pain. Don't take with Celebrex, Disp: 60 tablet, Rfl: 0  levothyroxine (SYNTHROID) 75 MCG tablet, TAKE 1 TABLET BY MOUTH DAILY, Disp: 90 tablet, Rfl: 2  progesterone (PROMETRIUM) 100 MG capsule, TK 1 CAPSULE PO QHS, Disp: , Rfl: 5  senna (SENOKOT) 8.6 mg tablet, Senna Lax 8.6 mg tablet Take 2 tablets as needed by oral route., Disp: , Rfl:     No current facility-administered medications for this visit.       Review of patient's allergies indicates:  No Known Allergies      Review of Systems   Constitutional: Negative.  Negative for chills and fever.   HENT: Negative.  Trouble swallowing: sed.     Respiratory: Negative.  Negative for cough and shortness of breath.    Cardiovascular: Negative.    Genitourinary: Negative.    Musculoskeletal: Positive for arthralgias, joint swelling and myalgias.   Skin: Positive for rash.   Neurological: Negative.        Objective:      Physical Exam   Constitutional: She is oriented to person, place, and time. No distress.   HENT:   Head: Normocephalic and atraumatic.   Eyes: Pupils are equal, round, and reactive to light.   Neck: Normal range of motion.   Cardiovascular: Normal rate.   Pulmonary/Chest: Effort normal.   Musculoskeletal: She exhibits tenderness. She exhibits no edema.        Hands:  Neurological: She is alert and oriented to person, place, and time.   Skin: Rash noted. She is not diaphoretic. No erythema.        Psychiatric: She has a normal mood and affect. Her behavior is normal.       Assessment:       1. Finger mass, left    2. Rash and nonspecific skin eruption    3. Pain in joint involving multiple sites        Plan:       1. Finger mass, left    - X-Ray Hand 3 view Left; Future  - Sedimentation rate; Future  - Rheumatoid factor; Future  - Ambulatory referral/consult to Orthopedics; Future  - CBC auto differential; Future  - meloxicam (MOBIC) 15 MG tablet; Take 1 tablet (15 mg total) by mouth once daily.  Dispense: 7 tablet; Refill: 0    2. Rash and nonspecific skin eruption    - MILDRED Screen w/Reflex; Future  - Ambulatory referral to Dermatology  - clotrimazole-betamethasone 1-0.05% (LOTRISONE) cream; Apply topically 2 (two) times daily.  Dispense: 45 g; Refill: 0    3. Pain in joint involving multiple sites    - Sedimentation rate; Future  - Rheumatoid factor; Future  - MILDRED Screen w/Reflex; Future

## 2019-10-18 ENCOUNTER — TELEPHONE (OUTPATIENT)
Dept: FAMILY MEDICINE | Facility: CLINIC | Age: 44
End: 2019-10-18

## 2019-10-18 NOTE — TELEPHONE ENCOUNTER
----- Message from Daniel Jacobs sent at 10/18/2019  2:57 PM CDT -----  Contact: patient   Type:  Patient Returning Call    Who Called: patient   Who Left Message for Patient:  n/a  Does the patient know what this is regarding?:  Xray results   Best Call Back Number:  [phone] (home) [phone] (work)

## 2019-10-30 ENCOUNTER — OFFICE VISIT (OUTPATIENT)
Dept: ORTHOPEDICS | Facility: CLINIC | Age: 44
End: 2019-10-30
Payer: COMMERCIAL

## 2019-10-30 VITALS
SYSTOLIC BLOOD PRESSURE: 113 MMHG | WEIGHT: 140.19 LBS | HEIGHT: 64 IN | BODY MASS INDEX: 23.93 KG/M2 | HEART RATE: 67 BPM | DIASTOLIC BLOOD PRESSURE: 78 MMHG

## 2019-10-30 DIAGNOSIS — R22.32 FINGER MASS, LEFT: ICD-10-CM

## 2019-10-30 DIAGNOSIS — S63.639A JERSEY FINGER, INITIAL ENCOUNTER: Primary | ICD-10-CM

## 2019-10-30 PROCEDURE — 3008F BODY MASS INDEX DOCD: CPT | Mod: CPTII,S$GLB,, | Performed by: ORTHOPAEDIC SURGERY

## 2019-10-30 PROCEDURE — 99999 PR PBB SHADOW E&M-EST. PATIENT-LVL III: ICD-10-PCS | Mod: PBBFAC,,, | Performed by: ORTHOPAEDIC SURGERY

## 2019-10-30 PROCEDURE — 99999 PR PBB SHADOW E&M-EST. PATIENT-LVL III: CPT | Mod: PBBFAC,,, | Performed by: ORTHOPAEDIC SURGERY

## 2019-10-30 PROCEDURE — 3008F PR BODY MASS INDEX (BMI) DOCUMENTED: ICD-10-PCS | Mod: CPTII,S$GLB,, | Performed by: ORTHOPAEDIC SURGERY

## 2019-10-30 PROCEDURE — 99203 OFFICE O/P NEW LOW 30 MIN: CPT | Mod: S$GLB,,, | Performed by: ORTHOPAEDIC SURGERY

## 2019-10-30 PROCEDURE — 99203 PR OFFICE/OUTPT VISIT, NEW, LEVL III, 30-44 MIN: ICD-10-PCS | Mod: S$GLB,,, | Performed by: ORTHOPAEDIC SURGERY

## 2019-10-30 NOTE — LETTER
October 30, 2019      Darling Sosa NP  [address] [facility] [address] [place] [address]           [facility]  [address] [place] [address]  Phone: [phone]          Patient: Brenna Soni   MR Number: [id number]   YOB: 1975   Date of Visit: 10/30/2019       Dear Darling Sosa:    Thank you for referring Brenna Soni to me for evaluation. Attached you will find relevant portions of my assessment and plan of care.    If you have questions, please do not hesitate to call me. I look forward to following Brenna Soni along with you.    Sincerely,    Chris Jason MD    Enclosure  CC:  No Recipients    If you would like to receive this communication electronically, please contact [email or web address] or [phone] to request more information on [employer] software Link access.    For providers and/or their staff who would like to refer a patient to [facility], please contact us through our one-stop-shop provider referral line, Jayda Eaton, at [phone].    If you feel you have received this communication in error or would no longer like to receive these types of communications, please e-mail [email or web address]
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
## 2019-10-31 ENCOUNTER — HOSPITAL ENCOUNTER (OUTPATIENT)
Dept: RADIOLOGY | Facility: HOSPITAL | Age: 44
Discharge: HOME | End: 2019-10-31
Attending: ORTHOPAEDIC SURGERY
Payer: COMMERCIAL

## 2019-10-31 DIAGNOSIS — R22.32 FINGER MASS, LEFT: ICD-10-CM

## 2019-10-31 DIAGNOSIS — S63.639A JERSEY FINGER, INITIAL ENCOUNTER: ICD-10-CM

## 2019-10-31 PROCEDURE — 73218 MRI UPPER EXTREMITY W/O DYE: CPT | Mod: 26,LT,, | Performed by: RADIOLOGY

## 2019-10-31 PROCEDURE — 73218 MRI UPPER EXTREMITY W/O DYE: CPT | Mod: TC,PO,LT

## 2019-10-31 PROCEDURE — 73218 MRI HAND FINGERS WITHOUT CONTRAST LEFT: ICD-10-PCS | Mod: 26,LT,, | Performed by: RADIOLOGY

## 2019-11-06 ENCOUNTER — OFFICE VISIT (OUTPATIENT)
Dept: ORTHOPEDICS | Facility: CLINIC | Age: 44
End: 2019-11-06
Payer: COMMERCIAL

## 2019-11-06 VITALS
BODY MASS INDEX: 23.9 KG/M2 | DIASTOLIC BLOOD PRESSURE: 66 MMHG | HEIGHT: 64 IN | WEIGHT: 140 LBS | SYSTOLIC BLOOD PRESSURE: 103 MMHG | HEART RATE: 54 BPM

## 2019-11-06 DIAGNOSIS — G56.02 CARPAL TUNNEL SYNDROME OF LEFT WRIST: ICD-10-CM

## 2019-11-06 DIAGNOSIS — M79.645 PAIN IN FINGER OF LEFT HAND: Primary | ICD-10-CM

## 2019-11-06 PROCEDURE — 99213 PR OFFICE/OUTPT VISIT, EST, LEVL III, 20-29 MIN: ICD-10-PCS | Mod: 25,S$GLB,, | Performed by: ORTHOPAEDIC SURGERY

## 2019-11-06 PROCEDURE — 99213 OFFICE O/P EST LOW 20 MIN: CPT | Mod: 25,S$GLB,, | Performed by: ORTHOPAEDIC SURGERY

## 2019-11-06 PROCEDURE — 99999 PR PBB SHADOW E&M-EST. PATIENT-LVL III: CPT | Mod: PBBFAC,,, | Performed by: ORTHOPAEDIC SURGERY

## 2019-11-06 PROCEDURE — 20526 THER INJECTION CARP TUNNEL: CPT | Mod: LT,S$GLB,, | Performed by: ORTHOPAEDIC SURGERY

## 2019-11-06 PROCEDURE — 3008F BODY MASS INDEX DOCD: CPT | Mod: CPTII,S$GLB,, | Performed by: ORTHOPAEDIC SURGERY

## 2019-11-06 PROCEDURE — 20526 CARPAL TUNNEL: ICD-10-PCS | Mod: LT,S$GLB,, | Performed by: ORTHOPAEDIC SURGERY

## 2019-11-06 PROCEDURE — 3008F PR BODY MASS INDEX (BMI) DOCUMENTED: ICD-10-PCS | Mod: CPTII,S$GLB,, | Performed by: ORTHOPAEDIC SURGERY

## 2019-11-06 PROCEDURE — 99999 PR PBB SHADOW E&M-EST. PATIENT-LVL III: ICD-10-PCS | Mod: PBBFAC,,, | Performed by: ORTHOPAEDIC SURGERY

## 2019-11-06 RX ORDER — TRIAMCINOLONE ACETONIDE 40 MG/ML
40 INJECTION, SUSPENSION INTRA-ARTICULAR; INTRAMUSCULAR
Status: DISCONTINUED | OUTPATIENT
Start: 2019-11-06 | End: 2019-11-06 | Stop reason: HOSPADM

## 2019-11-06 RX ADMIN — TRIAMCINOLONE ACETONIDE 40 MG: 40 INJECTION, SUSPENSION INTRA-ARTICULAR; INTRAMUSCULAR at 01:11

## 2019-11-06 NOTE — PROGRESS NOTES
Ms Graham returns to clinic today. She has a history of left ring finger pain and possible mass.  There was a concern over a Jersey finger.  She was sent for an MRI at her last visit.  She is here today for follow-up.  She states that the swelling has gone down some but that she is still having pain and dysfunction in the finger.    Physical exam:  Examination of the left ring finger reveals that there is no significant edema.  There are no skin changes.  Palpation still produces significant tenderness over the tip of the finger from the level of the distal interphalangeal joint distally. There is a small nodule or mass overlying the volar surface of the PIP joint. This is possibly consistent with the head of the middle phalanx.  She is able to actively flex at the distal interphalangeal joint but this is limited due to pain. She does have capillary refill less than 2 sec at the tip.  She states that she does have a sense of swelling and numbness overlying the entire finger.  The remainder of the fingers do not have swelling edema or pathology.    Examination of the left hand reveals that there is no edema.  There are no skin changes.  She has a mildly positive Tinel's and a positive Durkan's test which does reproduce some of the symptoms in the ring finger.    Radiology:  MRI of the left hand and ring finger have been reviewed.  There is no tenderness lesion noted.  The tendon appears to remain intact.  There is no mass located.  There is very mild edema in the soft tissues overlying the DIP joint.    Assessment:  Left ring finger pain with possible carpal tunnel syndrome    Plan:    1.  Based off of x-rays, physical exam, and MRI do not see a significant tenderness injury or mass in the finger.  She does have reproduction of some of her symptoms with Durkan's testing and therefore I have discussed the possibility of steroid injection into the carpal tunnel to see if this improves some of her symptoms.  The  patient states an understanding of the risks of the procedure and informed consent was obtained.    2.  An injection was placed to the left carpal tunnel.    3.  The patient will follow up with me in 10-14 days for repeat evaluation.

## 2019-11-06 NOTE — PROCEDURES
Carpal Tunnel  Date/Time: 11/6/2019 1:40 PM  Performed by: Chris Jason MD  Authorized by: Chris Jason MD     Consent Done?: Yes (Verbal)  Indications: Pain  Site marked: The procedure site was marked    Timeout: Prior to procedure the correct patient, procedure, and site was verified      Location: Left carpal tunnel.  Prep: Patient was prepped and draped in usual sterile fashion    Needle size:  25 G  Medications:  40 mg triamcinolone acetonide 40 mg/mL (20 mg injected)  Patient tolerance:  Patient tolerated the procedure well with no immediate complications

## 2019-12-12 ENCOUNTER — OFFICE VISIT (OUTPATIENT)
Dept: ORTHOPEDICS | Facility: CLINIC | Age: 44
End: 2019-12-12
Payer: COMMERCIAL

## 2019-12-12 ENCOUNTER — HOSPITAL ENCOUNTER (OUTPATIENT)
Dept: RADIOLOGY | Facility: HOSPITAL | Age: 44
Discharge: HOME | End: 2019-12-12
Attending: ORTHOPAEDIC SURGERY
Payer: COMMERCIAL

## 2019-12-12 VITALS — BODY MASS INDEX: 23.9 KG/M2 | HEIGHT: 64 IN | WEIGHT: 140 LBS

## 2019-12-12 DIAGNOSIS — M25.511 RIGHT SHOULDER PAIN, UNSPECIFIED CHRONICITY: Primary | ICD-10-CM

## 2019-12-12 DIAGNOSIS — M75.82 ROTATOR CUFF TENDINITIS, LEFT: ICD-10-CM

## 2019-12-12 DIAGNOSIS — M25.512 CHRONIC LEFT SHOULDER PAIN: Primary | ICD-10-CM

## 2019-12-12 DIAGNOSIS — G89.29 CHRONIC RIGHT SHOULDER PAIN: Primary | ICD-10-CM

## 2019-12-12 DIAGNOSIS — G89.29 CHRONIC LEFT SHOULDER PAIN: ICD-10-CM

## 2019-12-12 DIAGNOSIS — M25.511 CHRONIC RIGHT SHOULDER PAIN: Primary | ICD-10-CM

## 2019-12-12 DIAGNOSIS — M25.512 CHRONIC LEFT SHOULDER PAIN: ICD-10-CM

## 2019-12-12 DIAGNOSIS — G89.29 CHRONIC LEFT SHOULDER PAIN: Primary | ICD-10-CM

## 2019-12-12 PROCEDURE — 99214 OFFICE O/P EST MOD 30 MIN: CPT | Mod: S$GLB,,, | Performed by: ORTHOPAEDIC SURGERY

## 2019-12-12 PROCEDURE — 99999 PR PBB SHADOW E&M-EST. PATIENT-LVL III: ICD-10-PCS | Mod: PBBFAC,,, | Performed by: ORTHOPAEDIC SURGERY

## 2019-12-12 PROCEDURE — 3008F BODY MASS INDEX DOCD: CPT | Mod: CPTII,S$GLB,, | Performed by: ORTHOPAEDIC SURGERY

## 2019-12-12 PROCEDURE — 73030 XR SHOULDER TRAUMA 3 VIEW LEFT: ICD-10-PCS | Mod: 26,LT,, | Performed by: RADIOLOGY

## 2019-12-12 PROCEDURE — 99999 PR PBB SHADOW E&M-EST. PATIENT-LVL III: CPT | Mod: PBBFAC,,, | Performed by: ORTHOPAEDIC SURGERY

## 2019-12-12 PROCEDURE — 3008F PR BODY MASS INDEX (BMI) DOCUMENTED: ICD-10-PCS | Mod: CPTII,S$GLB,, | Performed by: ORTHOPAEDIC SURGERY

## 2019-12-12 PROCEDURE — 73030 X-RAY EXAM OF SHOULDER: CPT | Mod: 26,LT,, | Performed by: RADIOLOGY

## 2019-12-12 PROCEDURE — 99214 PR OFFICE/OUTPT VISIT, EST, LEVL IV, 30-39 MIN: ICD-10-PCS | Mod: S$GLB,,, | Performed by: ORTHOPAEDIC SURGERY

## 2019-12-12 PROCEDURE — 73030 X-RAY EXAM OF SHOULDER: CPT | Mod: TC,PO,LT

## 2019-12-12 NOTE — PROGRESS NOTES
HISTORY OF PRESENT ILLNESS:  A 44 year old, recurrent chronic pain in the left   shoulder, has received injection in the past for impingement/cuff tendinitis and   has done well.  Symptoms have recurred.  At this point, she is interested in   more aggressive treatment.  She has been trying a home exercise program without   complete relief.    PLAN:  We are going to get MRI of that shoulder and then we will see her back   after that to discuss different treatment options.        JAZLYN/LEONARDO  dd: 12/12/2019 15:05:01 (CST)  td: 12/13/2019 07:31:06 (CST)  Doc ID   #[id number]  Job ID #[id number]    CC:

## 2019-12-20 ENCOUNTER — OFFICE VISIT (OUTPATIENT)
Dept: ORTHOPEDICS | Facility: CLINIC | Age: 44
End: 2019-12-20
Payer: COMMERCIAL

## 2019-12-20 VITALS — HEIGHT: 64 IN | WEIGHT: 140 LBS | BODY MASS INDEX: 23.9 KG/M2

## 2019-12-20 DIAGNOSIS — M25.511 CHRONIC RIGHT SHOULDER PAIN: Primary | ICD-10-CM

## 2019-12-20 DIAGNOSIS — G89.29 CHRONIC RIGHT SHOULDER PAIN: Primary | ICD-10-CM

## 2019-12-20 DIAGNOSIS — M75.82 ROTATOR CUFF TENDINITIS, LEFT: ICD-10-CM

## 2019-12-20 PROCEDURE — 3008F BODY MASS INDEX DOCD: CPT | Mod: CPTII,S$GLB,, | Performed by: ORTHOPAEDIC SURGERY

## 2019-12-20 PROCEDURE — 3008F PR BODY MASS INDEX (BMI) DOCUMENTED: ICD-10-PCS | Mod: CPTII,S$GLB,, | Performed by: ORTHOPAEDIC SURGERY

## 2019-12-20 PROCEDURE — 20610 LARGE JOINT ASPIRATION/INJECTION: L SUBACROMIAL BURSA: ICD-10-PCS | Mod: LT,S$GLB,, | Performed by: ORTHOPAEDIC SURGERY

## 2019-12-20 PROCEDURE — 20610 DRAIN/INJ JOINT/BURSA W/O US: CPT | Mod: LT,S$GLB,, | Performed by: ORTHOPAEDIC SURGERY

## 2019-12-20 PROCEDURE — 99214 PR OFFICE/OUTPT VISIT, EST, LEVL IV, 30-39 MIN: ICD-10-PCS | Mod: 25,S$GLB,, | Performed by: ORTHOPAEDIC SURGERY

## 2019-12-20 PROCEDURE — 99214 OFFICE O/P EST MOD 30 MIN: CPT | Mod: 25,S$GLB,, | Performed by: ORTHOPAEDIC SURGERY

## 2019-12-20 PROCEDURE — 99999 PR PBB SHADOW E&M-EST. PATIENT-LVL III: ICD-10-PCS | Mod: PBBFAC,,, | Performed by: ORTHOPAEDIC SURGERY

## 2019-12-20 PROCEDURE — 99999 PR PBB SHADOW E&M-EST. PATIENT-LVL III: CPT | Mod: PBBFAC,,, | Performed by: ORTHOPAEDIC SURGERY

## 2019-12-20 RX ORDER — TRIAMCINOLONE ACETONIDE 40 MG/ML
80 INJECTION, SUSPENSION INTRA-ARTICULAR; INTRAMUSCULAR
Status: DISCONTINUED | OUTPATIENT
Start: 2019-12-20 | End: 2019-12-20 | Stop reason: HOSPADM

## 2019-12-20 RX ADMIN — TRIAMCINOLONE ACETONIDE 80 MG: 40 INJECTION, SUSPENSION INTRA-ARTICULAR; INTRAMUSCULAR at 08:12

## 2019-12-20 NOTE — PROGRESS NOTES
A 44 year old, followup of her left shoulder pain.  We got an MRI which showed   tendinosis in the supraspinatus and subscap tendon as well as the proximal long   head of the biceps.  At this point, she is symptomatic, wants a repeat   injection.  Her last injection was in May, she responded well.  We are going to   repeat injection into her left shoulder.  We will also get her set up with   Physical Therapy on a formal basis.  We will see her back as needed.        PBB/HN  dd: 2019 09:17:46 (CST)  td: 2019 00:56:40 (CST)  Doc ID   #[id number]  Job ID #[id number]    CC:     Further History  Aching pain  Worse with activity  Relieved with rest  No other associated symptoms  No other radiation    Further Exam  Alert and oriented  Pleasant  Contralateral limb has appropriate range of motion for age and condition  Contralateral limb has appropriate strength for age and condition  Contralateral limb has appropriate stability  for age and condition  No adenopathy  Pulses are appropriate for current condition  Skin is intact        Chief Complaint    Chief Complaint   Patient presents with    Right Shoulder - Pain       HPI  Brenna Soni is a 44 y.o.  female who presents with       Past Medical History  Past Medical History:   Diagnosis Date    Anal fissure     Hypothyroid 2013    Localized adiposity     trunk    Rectal bleeding     TBI (traumatic brain injury)     concussion, CT done.      Vertigo     Water retention     takes diuretic       Past Surgical History  Past Surgical History:   Procedure Laterality Date    AUGMENTATION OF BREAST Bilateral     Lift also    BREAST SURGERY      augmentation     SECTION      COSMETIC SURGERY  2015    tummy tuck    gastric sleeve      laparoscopic abdominal      times 2    Tummy tuck      uterine ablation         Medications  Current Outpatient Medications   Medication Sig    celecoxib (CELEBREX) 200 MG capsule TAKE 1  CAPSULE(200 MG) BY MOUTH EVERY DAY. MAY TAKE 2ND CAPSULE IF NEEDED    clotrimazole-betamethasone 1-0.05% (LOTRISONE) cream Apply topically 2 (two) times daily.    docusate sodium (COLACE) 100 MG capsule Colace 100 mg capsule   Take 2 capsules every day PRn by oral route in the evening for 30 days.    estradiol (ESTRACE) 2 MG tablet TK 1 T PO QD    ibuprofen (ADVIL,MOTRIN) 800 MG tablet Take 1 tablet (800 mg total) by mouth after meals as needed for Pain. Don't take with Celebrex    levothyroxine (SYNTHROID) 75 MCG tablet TAKE 1 TABLET BY MOUTH DAILY    meloxicam (MOBIC) 15 MG tablet Take 1 tablet (15 mg total) by mouth once daily.    progesterone (PROMETRIUM) 100 MG capsule TK 1 CAPSULE PO QHS    senna (SENOKOT) 8.6 mg tablet Senna Lax 8.6 mg tablet   Take 2 tablets as needed by oral route.     No current facility-administered medications for this visit.        Allergies  Review of patient's allergies indicates:  No Known Allergies    Family History  Family History   Adopted: Yes       Social History  Social History     Socioeconomic History    Marital status:      Spouse name: Not on file    Number of children: Not on file    Years of education: Not on file    Highest education level: Not on file   Occupational History     Employer: [employer]   Social Needs    Financial resource strain: Not hard at all    Food insecurity:     Worry: Never true     Inability: Never true    Transportation needs:     Medical: No     Non-medical: No   Tobacco Use    Smoking status: Never Smoker    Smokeless tobacco: Never Used   Substance and Sexual Activity    Alcohol use: Yes     Frequency: Monthly or less     Drinks per session: 7 to 9     Binge frequency: Never     Comment: 2-3 glasses of wine/week    Drug use: No    Sexual activity: Not on file   Lifestyle    Physical activity:     Days per week: 4 days     Minutes per session: 40 min    Stress: To some extent   Relationships    Social  connections:     Talks on phone: Twice a week     Gets together: Once a week     Attends Mosque service: Not on file     Active member of club or organization: No     Attends meetings of clubs or organizations: Never     Relationship status:    Other Topics Concern    Not on file   Social History Narrative    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               Review of Systems     Constitutional: Negative    HENT: Negative  Eyes: Negative  Respiratory: Negative  Cardiovascular: Negative  Musculoskeletal: HPI  Skin: Negative  Neurological: Negative  Hematological: Negative  Endocrine: Negative                 Physical Exam    There were no vitals filed for this visit.  Body mass index is 24.03 kg/m².  Physical Examination:     General appearance -  well appearing, and in no distress  Mental status - awake  Neck - supple  Chest -  symmetric air entry  Heart - normal rate   Abdomen - soft      Assessment     1. Chronic right shoulder pain    2. Rotator cuff tendinitis, left          Plan

## 2019-12-20 NOTE — PROCEDURES
Large Joint Aspiration/Injection: L subacromial bursa  Date/Time: 12/20/2019 8:30 AM  Performed by: Jf Rodriguez MD  Authorized by: Jf Rodriguez MD     Consent Done?:  Yes (Verbal)  Indications:  Pain  Procedure site marked: Yes    Timeout: Prior to procedure the correct patient, procedure, and site was verified      Location:  Shoulder  Site:  L subacromial bursa  Prep: Patient was prepped and draped in usual sterile fashion    Needle size:  21 G  Ultrasonic Guidance for needle placement: No  Approach:  Posterior  Medications:  80 mg triamcinolone acetonide 40 mg/mL  Patient tolerance:  Patient tolerated the procedure well with no immediate complications

## 2020-05-11 DIAGNOSIS — E03.9 HYPOTHYROIDISM: ICD-10-CM

## 2020-05-12 RX ORDER — LEVOTHYROXINE SODIUM 75 UG/1
75 TABLET ORAL DAILY
Qty: 90 TABLET | Refills: 0 | Status: SHIPPED | OUTPATIENT
Start: 2020-05-12 | End: 2020-11-17 | Stop reason: SDUPTHER

## 2020-05-12 NOTE — PROGRESS NOTES
Refill Routing Note    Medication(s) are not appropriate for processing by [facility]:       Patient has not been seen in over 15 months by PCP       Will follow up with your staff to schedule appointment and labs after your decision.      Medication-related problems identified:   Requires labs  Requires appointment  Medication Therapy Plan: UNCLEAR IF PT. FOLLOW WITH PCP; NTBO(TSH), DEFER TO YOU  Medication reconciliation completed: No      Automatic Epic Protocol Generated Data:    Requested Prescriptions   Pending Prescriptions Disp Refills    levothyroxine (SYNTHROID) 75 MCG tablet 90 tablet 0     Sig: Take 1 tablet (75 mcg total) by mouth once daily.       Endocrinology:  Hypothyroid Agents Failed - 5/11/2020 10:15 AM        Failed - Manual Review: FT4 is not required if last TSH is WNL.        Failed - TSH in normal range and within 360 days     TSH   Date Value Ref Range Status   03/22/2019 3.376 0.400 - 4.000 uIU/mL Final   06/12/2018 0.676 0.400 - 4.000 uIU/mL Final   07/31/2017 2.435 0.400 - 4.000 uIU/mL Final              Passed - Patient is at least 18 years old        Passed - Negative Pregnancy Status Check        Passed - Office visit in past 12 months or future 90 days.     Recent Outpatient Visits            4 months ago Chronic right shoulder pain    [facility]    5 months ago Chronic left shoulder pain    [facility] [place] [facility]    6 months ago [facility]    6 months ago Jersey finger, initial encounter    [facility] [place]    6 months ago Finger mass, left    [facility] Darling Sosa NP                    Passed - T4 free within 1080 days     Free T4   Date Value Ref Range Status   03/22/2019 1.17 0.71 - 1.51 ng/dL Final   10/28/2010 1.59 (H) 0.71 - 1.51 ng/dl Final   07/15/2010 1.43 0.71 -  1.51 ng/dl Final                 Appointments  past 12m or future 3m with PCP    Date Provider   Last Visit   8/22/2016 Momo Hill Jr., MD   Next Visit   Visit date not found Momo Hill Jr., MD   ED visits in past 90 days: 0     Note composed:3:48 PM 05/12/2020

## 2020-05-19 NOTE — TELEPHONE ENCOUNTER
Placed call to pt, no answer at this time. Left voicemail to call clinic. Call back number provided.  [employer] message sent to pt

## 2020-05-22 ENCOUNTER — LAB VISIT (OUTPATIENT)
Dept: LAB | Facility: HOSPITAL | Age: 45
End: 2020-05-22
Attending: EMERGENCY MEDICINE
Payer: COMMERCIAL

## 2020-05-22 ENCOUNTER — PATIENT MESSAGE (OUTPATIENT)
Dept: FAMILY MEDICINE | Facility: CLINIC | Age: 45
End: 2020-05-22

## 2020-05-22 DIAGNOSIS — E03.9 HYPOTHYROIDISM: ICD-10-CM

## 2020-05-22 PROCEDURE — 84443 ASSAY THYROID STIM HORMONE: CPT

## 2020-05-22 PROCEDURE — 36415 COLL VENOUS BLD VENIPUNCTURE: CPT | Mod: PO

## 2020-05-23 LAB — TSH SERPL DL<=0.005 MIU/L-ACNC: 1.75 UIU/ML (ref 0.4–4)

## 2020-07-17 ENCOUNTER — OFFICE VISIT (OUTPATIENT)
Dept: FAMILY MEDICINE | Facility: CLINIC | Age: 45
End: 2020-07-17
Payer: COMMERCIAL

## 2020-07-17 VITALS
BODY MASS INDEX: 26.49 KG/M2 | SYSTOLIC BLOOD PRESSURE: 108 MMHG | HEIGHT: 64 IN | WEIGHT: 155.19 LBS | HEART RATE: 82 BPM | OXYGEN SATURATION: 99 % | DIASTOLIC BLOOD PRESSURE: 68 MMHG | TEMPERATURE: 98 F

## 2020-07-17 DIAGNOSIS — H92.02 LEFT EAR PAIN: ICD-10-CM

## 2020-07-17 DIAGNOSIS — S40.012A CONTUSION OF LEFT SHOULDER, INITIAL ENCOUNTER: ICD-10-CM

## 2020-07-17 DIAGNOSIS — R10.9 LEFT LATERAL ABDOMINAL PAIN: ICD-10-CM

## 2020-07-17 DIAGNOSIS — S50.02XA CONTUSION OF LEFT ELBOW, INITIAL ENCOUNTER: ICD-10-CM

## 2020-07-17 DIAGNOSIS — R63.5 WEIGHT GAIN: ICD-10-CM

## 2020-07-17 DIAGNOSIS — M25.50 POLYARTHRALGIA: Primary | ICD-10-CM

## 2020-07-17 PROCEDURE — 3008F PR BODY MASS INDEX (BMI) DOCUMENTED: ICD-10-PCS | Mod: CPTII,S$GLB,, | Performed by: PHYSICIAN ASSISTANT

## 2020-07-17 PROCEDURE — 99214 PR OFFICE/OUTPT VISIT, EST, LEVL IV, 30-39 MIN: ICD-10-PCS | Mod: S$GLB,,, | Performed by: PHYSICIAN ASSISTANT

## 2020-07-17 PROCEDURE — 99999 PR PBB SHADOW E&M-EST. PATIENT-LVL IV: ICD-10-PCS | Mod: PBBFAC,,, | Performed by: PHYSICIAN ASSISTANT

## 2020-07-17 PROCEDURE — 3008F BODY MASS INDEX DOCD: CPT | Mod: CPTII,S$GLB,, | Performed by: PHYSICIAN ASSISTANT

## 2020-07-17 PROCEDURE — 99214 OFFICE O/P EST MOD 30 MIN: CPT | Mod: S$GLB,,, | Performed by: PHYSICIAN ASSISTANT

## 2020-07-17 PROCEDURE — 99999 PR PBB SHADOW E&M-EST. PATIENT-LVL IV: CPT | Mod: PBBFAC,,, | Performed by: PHYSICIAN ASSISTANT
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
## 2020-07-20 ENCOUNTER — HOSPITAL ENCOUNTER (OUTPATIENT)
Dept: RADIOLOGY | Facility: HOSPITAL | Age: 45
Discharge: HOME | End: 2020-07-20
Attending: PHYSICIAN ASSISTANT
Payer: COMMERCIAL

## 2020-07-20 DIAGNOSIS — S50.02XA CONTUSION OF LEFT ELBOW, INITIAL ENCOUNTER: ICD-10-CM

## 2020-07-20 PROCEDURE — 73080 XR ELBOW COMPLETE 3 VIEW LEFT: ICD-10-PCS | Mod: 26,LT,, | Performed by: RADIOLOGY

## 2020-07-20 PROCEDURE — 73080 X-RAY EXAM OF ELBOW: CPT | Mod: TC,FY,PO,LT

## 2020-07-20 PROCEDURE — 73080 X-RAY EXAM OF ELBOW: CPT | Mod: 26,LT,, | Performed by: RADIOLOGY

## 2020-07-29 ENCOUNTER — OFFICE VISIT (OUTPATIENT)
Dept: OTOLARYNGOLOGY | Facility: CLINIC | Age: 45
End: 2020-07-29
Payer: COMMERCIAL

## 2020-07-29 VITALS — WEIGHT: 155.19 LBS | HEIGHT: 64 IN | BODY MASS INDEX: 26.49 KG/M2

## 2020-07-29 DIAGNOSIS — H92.02 REFERRED OTALGIA OF LEFT EAR: Primary | ICD-10-CM

## 2020-07-29 PROCEDURE — 99203 PR OFFICE/OUTPT VISIT, NEW, LEVL III, 30-44 MIN: ICD-10-PCS | Mod: S$GLB,,, | Performed by: NURSE PRACTITIONER

## 2020-07-29 PROCEDURE — 99999 PR PBB SHADOW E&M-EST. PATIENT-LVL III: CPT | Mod: PBBFAC,,, | Performed by: NURSE PRACTITIONER

## 2020-07-29 PROCEDURE — 3008F BODY MASS INDEX DOCD: CPT | Mod: CPTII,S$GLB,, | Performed by: NURSE PRACTITIONER

## 2020-07-29 PROCEDURE — 3008F PR BODY MASS INDEX (BMI) DOCUMENTED: ICD-10-PCS | Mod: CPTII,S$GLB,, | Performed by: NURSE PRACTITIONER

## 2020-07-29 PROCEDURE — 99203 OFFICE O/P NEW LOW 30 MIN: CPT | Mod: S$GLB,,, | Performed by: NURSE PRACTITIONER

## 2020-07-29 PROCEDURE — 99999 PR PBB SHADOW E&M-EST. PATIENT-LVL III: ICD-10-PCS | Mod: PBBFAC,,, | Performed by: NURSE PRACTITIONER
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
## 2020-08-05 ENCOUNTER — HOSPITAL ENCOUNTER (OUTPATIENT)
Dept: RADIOLOGY | Facility: HOSPITAL | Age: 45
Discharge: HOME | End: 2020-08-05
Attending: ORTHOPAEDIC SURGERY
Payer: COMMERCIAL

## 2020-08-05 ENCOUNTER — OFFICE VISIT (OUTPATIENT)
Dept: ORTHOPEDICS | Facility: CLINIC | Age: 45
End: 2020-08-05
Payer: COMMERCIAL

## 2020-08-05 VITALS
HEIGHT: 64 IN | WEIGHT: 155.19 LBS | DIASTOLIC BLOOD PRESSURE: 78 MMHG | BODY MASS INDEX: 26.49 KG/M2 | SYSTOLIC BLOOD PRESSURE: 114 MMHG | HEART RATE: 70 BPM

## 2020-08-05 DIAGNOSIS — M25.50 POLYARTHRALGIA: ICD-10-CM

## 2020-08-05 PROCEDURE — 73130 X-RAY EXAM OF HAND: CPT | Mod: TC,50,PO

## 2020-08-05 PROCEDURE — 73130 XR HAND COMPLETE 3 VIEWS BILATERAL: ICD-10-PCS | Mod: 26,,, | Performed by: RADIOLOGY

## 2020-08-05 PROCEDURE — 99213 PR OFFICE/OUTPT VISIT, EST, LEVL III, 20-29 MIN: ICD-10-PCS | Mod: S$GLB,,, | Performed by: ORTHOPAEDIC SURGERY

## 2020-08-05 PROCEDURE — 99999 PR PBB SHADOW E&M-EST. PATIENT-LVL V: ICD-10-PCS | Mod: PBBFAC,,, | Performed by: ORTHOPAEDIC SURGERY

## 2020-08-05 PROCEDURE — 99999 PR PBB SHADOW E&M-EST. PATIENT-LVL V: CPT | Mod: PBBFAC,,, | Performed by: ORTHOPAEDIC SURGERY

## 2020-08-05 PROCEDURE — 3008F BODY MASS INDEX DOCD: CPT | Mod: CPTII,S$GLB,, | Performed by: ORTHOPAEDIC SURGERY

## 2020-08-05 PROCEDURE — 99213 OFFICE O/P EST LOW 20 MIN: CPT | Mod: S$GLB,,, | Performed by: ORTHOPAEDIC SURGERY

## 2020-08-05 PROCEDURE — 3008F PR BODY MASS INDEX (BMI) DOCUMENTED: ICD-10-PCS | Mod: CPTII,S$GLB,, | Performed by: ORTHOPAEDIC SURGERY

## 2020-08-05 PROCEDURE — 73130 X-RAY EXAM OF HAND: CPT | Mod: 26,,, | Performed by: RADIOLOGY

## 2020-08-05 RX ORDER — TESTOSTERONE CYPIONATE 200 MG/ML
INJECTION, SOLUTION INTRAMUSCULAR
COMMUNITY
Start: 2020-07-31 | End: 2021-04-27 | Stop reason: ALTCHOICE

## 2020-08-05 RX ORDER — MELOXICAM 15 MG/1
15 TABLET ORAL DAILY
Qty: 30 TABLET | Refills: 0 | Status: SHIPPED | OUTPATIENT
Start: 2020-08-05 | End: 2020-09-15 | Stop reason: SDUPTHER

## 2020-08-05 NOTE — LETTER
August 12, 2020      Jocelynn Hazel PA-C  [address] [facility] [address] [place] [address]           [facility] [place]  [address] [place] [address]  Phone: [phone]          Patient: Brenna Soni   MR Number: [id number]   YOB: 1975   Date of Visit: 8/5/2020       Dear Jocelynn Hazel:    Thank you for referring Brenna Soni to me for evaluation. Attached you will find relevant portions of my assessment and plan of care.    If you have questions, please do not hesitate to call me. I look forward to following Brenna Soni along with you.    Sincerely,    Chris Jason MD    Enclosure  CC:  No Recipients    If you would like to receive this communication electronically, please contact [email or web address] or [phone] to request more information on [employer] access.    For providers and/or their staff who would like to refer a patient to [facility], please contact us through our one-stop-shop provider referral line, [facility] , at [phone].    If you feel you have received this communication in error or would no longer like to receive these types of communications, please e-mail [email or web address]

## 2020-08-05 NOTE — PROGRESS NOTES
Ms Soni returns to clinic today.  She is having multiple joint aches and pains including multiple joints in her fingers as well as shoulders hips and lumbar spine.  She is here today for evaluation because she is developing some nodules on multiple fingers with continued pain.  She also has stiffness of her hands which is more significant in the mornings.  She states that throughout the day they do loosen up some.  She has not had any recent treatment or evaluation.    Physical exam:  Examination bilateral hand reveals that there is no edema.  There are no skin changes.  There are some nodules overlying the DIP and PIP joints of multiple fingers.  These are consistent with small mucoid cyst.  There is no threatened skin.  She is able to flex and extend the fingers but does feel as if she has some stiffness.  She does report intact sensation in the median radial ulnar distribution.  Capillary refill is less than 2 sec of all the digits.    Radiology:  X-rays of bilateral hands reveals that there is no significant arthropathy noted.  There are no fractures or dislocations    Assessment:  Bilateral hand arthralgia with mucoid cyst    Plan:    1.  The polyarthralgia that the patient has is consistent with possible rheumatologic condition and therefore would like to set the patient up with Rheumatology for further evaluation    2.  Will start her on Mobic 15 mg per day    3.  She will follow up with me on a p.r.n. basis

## 2020-08-06 ENCOUNTER — TELEPHONE (OUTPATIENT)
Dept: RHEUMATOLOGY | Facility: CLINIC | Age: 45
End: 2020-08-06

## 2020-08-06 NOTE — TELEPHONE ENCOUNTER
----- Message from Letitia Lezamaza sent at 8/6/2020  8:57 AM CDT -----  Patient has been referred by Dr Jason to rheumatology.  She would like to see whomever can see her soonest.  Please call her at [phone].  Thank you!    Spoke to the patient. She is interested in trying [place] and BAL for sooner. Appointment on 1-5-21 with Shamir will remain however just in case. JOSE

## 2020-08-06 NOTE — TELEPHONE ENCOUNTER
----- Message from Letitia Latham sent at 8/6/2020  8:57 AM CDT -----  Patient has been referred by Dr Jason to rheumatology.  She would like to see whomever can see her soonest.  Please call her at [phone].  Thank you!

## 2020-08-27 ENCOUNTER — OFFICE VISIT (OUTPATIENT)
Dept: ORTHOPEDICS | Facility: CLINIC | Age: 45
End: 2020-08-27
Payer: COMMERCIAL

## 2020-08-27 VITALS — BODY MASS INDEX: 26.49 KG/M2 | WEIGHT: 155.19 LBS | HEIGHT: 64 IN

## 2020-08-27 DIAGNOSIS — M75.82 ROTATOR CUFF TENDINITIS, LEFT: Primary | ICD-10-CM

## 2020-08-27 PROCEDURE — 3008F PR BODY MASS INDEX (BMI) DOCUMENTED: ICD-10-PCS | Mod: CPTII,S$GLB,, | Performed by: ORTHOPAEDIC SURGERY

## 2020-08-27 PROCEDURE — 3008F BODY MASS INDEX DOCD: CPT | Mod: CPTII,S$GLB,, | Performed by: ORTHOPAEDIC SURGERY

## 2020-08-27 PROCEDURE — 99999 PR PBB SHADOW E&M-EST. PATIENT-LVL III: ICD-10-PCS | Mod: PBBFAC,,, | Performed by: ORTHOPAEDIC SURGERY

## 2020-08-27 PROCEDURE — 99999 PR PBB SHADOW E&M-EST. PATIENT-LVL III: CPT | Mod: PBBFAC,,, | Performed by: ORTHOPAEDIC SURGERY

## 2020-08-27 PROCEDURE — 99213 OFFICE O/P EST LOW 20 MIN: CPT | Mod: S$GLB,,, | Performed by: ORTHOPAEDIC SURGERY

## 2020-08-27 PROCEDURE — 99213 PR OFFICE/OUTPT VISIT, EST, LEVL III, 20-29 MIN: ICD-10-PCS | Mod: S$GLB,,, | Performed by: ORTHOPAEDIC SURGERY
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
## 2020-09-10 ENCOUNTER — PATIENT OUTREACH (OUTPATIENT)
Dept: ADMINISTRATIVE | Facility: OTHER | Age: 45
End: 2020-09-10

## 2020-09-10 NOTE — PROGRESS NOTES
LINKS immunization registry updated  Care Everywhere updated  Health Maintenance updated  Chart reviewed for overdue Proactive [facility] ([facility]) health maintenance testing (CRS, Breast Ca, Diabetic Eye Exam)   Orders entered:N/A

## 2020-09-11 ENCOUNTER — OFFICE VISIT (OUTPATIENT)
Dept: ORTHOPEDICS | Facility: CLINIC | Age: 45
End: 2020-09-11
Payer: COMMERCIAL

## 2020-09-11 VITALS — BODY MASS INDEX: 26.49 KG/M2 | HEIGHT: 64 IN | WEIGHT: 155.19 LBS

## 2020-09-11 DIAGNOSIS — M79.18 MYOFASCIAL PAIN SYNDROME: ICD-10-CM

## 2020-09-11 DIAGNOSIS — M75.82 ROTATOR CUFF TENDINITIS, LEFT: Primary | ICD-10-CM

## 2020-09-11 DIAGNOSIS — M75.02 ADHESIVE CAPSULITIS OF LEFT SHOULDER: Primary | ICD-10-CM

## 2020-09-11 PROCEDURE — 99999 PR PBB SHADOW E&M-EST. PATIENT-LVL III: CPT | Mod: PBBFAC,,, | Performed by: ORTHOPAEDIC SURGERY

## 2020-09-11 PROCEDURE — 3008F PR BODY MASS INDEX (BMI) DOCUMENTED: ICD-10-PCS | Mod: CPTII,S$GLB,, | Performed by: ORTHOPAEDIC SURGERY

## 2020-09-11 PROCEDURE — 99999 PR PBB SHADOW E&M-EST. PATIENT-LVL III: ICD-10-PCS | Mod: PBBFAC,,, | Performed by: ORTHOPAEDIC SURGERY

## 2020-09-11 PROCEDURE — 99214 PR OFFICE/OUTPT VISIT, EST, LEVL IV, 30-39 MIN: ICD-10-PCS | Mod: 25,S$GLB,, | Performed by: ORTHOPAEDIC SURGERY

## 2020-09-11 PROCEDURE — 20610 DRAIN/INJ JOINT/BURSA W/O US: CPT | Mod: LT,S$GLB,, | Performed by: ORTHOPAEDIC SURGERY

## 2020-09-11 PROCEDURE — 20610 LARGE JOINT ASPIRATION/INJECTION: L SUBACROMIAL BURSA: ICD-10-PCS | Mod: LT,S$GLB,, | Performed by: ORTHOPAEDIC SURGERY

## 2020-09-11 PROCEDURE — 99214 OFFICE O/P EST MOD 30 MIN: CPT | Mod: 25,S$GLB,, | Performed by: ORTHOPAEDIC SURGERY

## 2020-09-11 PROCEDURE — 3008F BODY MASS INDEX DOCD: CPT | Mod: CPTII,S$GLB,, | Performed by: ORTHOPAEDIC SURGERY

## 2020-09-11 RX ORDER — TRIAMCINOLONE ACETONIDE 40 MG/ML
80 INJECTION, SUSPENSION INTRA-ARTICULAR; INTRAMUSCULAR
Status: DISCONTINUED | OUTPATIENT
Start: 2020-09-11 | End: 2020-09-11 | Stop reason: HOSPADM

## 2020-09-11 RX ADMIN — TRIAMCINOLONE ACETONIDE 80 MG: 40 INJECTION, SUSPENSION INTRA-ARTICULAR; INTRAMUSCULAR at 08:09

## 2020-09-11 NOTE — PROCEDURES
Large Joint Aspiration/Injection: L subacromial bursa    Date/Time: 9/11/2020 8:30 AM  Performed by: Jf Rodriguez MD  Authorized by: Jf Rodriguez MD     Consent Done?:  Yes (Verbal)  Site marked: the procedure site was marked    Prep: patient was prepped and draped in usual sterile fashion      Details:  Needle Size:  21 G  Approach:  Posterior  Location:  Shoulder  Site:  L subacromial bursa  Medications:  80 mg triamcinolone acetonide 40 mg/mL  Patient tolerance:  Patient tolerated the procedure well with no immediate complications
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
## 2020-09-14 ENCOUNTER — TELEPHONE (OUTPATIENT)
Dept: RHEUMATOLOGY | Facility: CLINIC | Age: 45
End: 2020-09-14
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
## 2020-09-15 ENCOUNTER — TELEPHONE (OUTPATIENT)
Dept: INTERNAL MEDICINE | Facility: CLINIC | Age: 45
End: 2020-09-15

## 2020-09-15 ENCOUNTER — HOSPITAL ENCOUNTER (OUTPATIENT)
Dept: RADIOLOGY | Facility: HOSPITAL | Age: 45
Discharge: HOME | End: 2020-09-15
Attending: INTERNAL MEDICINE
Payer: COMMERCIAL

## 2020-09-15 ENCOUNTER — OFFICE VISIT (OUTPATIENT)
Dept: RHEUMATOLOGY | Facility: CLINIC | Age: 45
End: 2020-09-15
Payer: COMMERCIAL

## 2020-09-15 DIAGNOSIS — R06.02 SOB (SHORTNESS OF BREATH): ICD-10-CM

## 2020-09-15 DIAGNOSIS — M54.2 NECK PAIN: ICD-10-CM

## 2020-09-15 DIAGNOSIS — M25.50 POLYARTHRALGIA: Primary | ICD-10-CM

## 2020-09-15 DIAGNOSIS — Z79.899 HIGH RISK MEDICATION USE: ICD-10-CM

## 2020-09-15 DIAGNOSIS — M25.50 POLYARTHRALGIA: ICD-10-CM

## 2020-09-15 PROCEDURE — 72050 X-RAY EXAM NECK SPINE 4/5VWS: CPT | Mod: TC,FY,PO

## 2020-09-15 PROCEDURE — 99203 PR OFFICE/OUTPT VISIT, NEW, LEVL III, 30-44 MIN: ICD-10-PCS | Mod: 95,,, | Performed by: INTERNAL MEDICINE

## 2020-09-15 PROCEDURE — 72050 XR CERVICAL SPINE COMPLETE 5 VIEW: ICD-10-PCS | Mod: 26,,, | Performed by: RADIOLOGY

## 2020-09-15 PROCEDURE — 99203 OFFICE O/P NEW LOW 30 MIN: CPT | Mod: 95,,, | Performed by: INTERNAL MEDICINE

## 2020-09-15 PROCEDURE — 72050 X-RAY EXAM NECK SPINE 4/5VWS: CPT | Mod: 26,,, | Performed by: RADIOLOGY

## 2020-09-15 PROCEDURE — 71046 X-RAY EXAM CHEST 2 VIEWS: CPT | Mod: TC,FY,PO

## 2020-09-15 PROCEDURE — 71046 X-RAY EXAM CHEST 2 VIEWS: CPT | Mod: 26,,, | Performed by: RADIOLOGY

## 2020-09-15 PROCEDURE — 71046 XR CHEST PA AND LATERAL: ICD-10-PCS | Mod: 26,,, | Performed by: RADIOLOGY

## 2020-09-15 RX ORDER — MELOXICAM 15 MG/1
15 TABLET ORAL DAILY
Qty: 30 TABLET | Refills: 2 | Status: SHIPPED | OUTPATIENT
Start: 2020-09-15 | End: 2021-04-27 | Stop reason: ALTCHOICE

## 2020-09-15 NOTE — TELEPHONE ENCOUNTER
Called patient to schedule lab work and x -ray per Dr. Pari Hdez. Scheduled both lab work and x ray today 09/15/2020. Patient verbalized understanding.

## 2020-09-15 NOTE — TELEPHONE ENCOUNTER
----- Message from Pari Hdez DO sent at 9/15/2020 10:58 AM CDT -----  Please schedule pt for labs, xrays and urine studies as ordered, she also needs a follow up in person in 3 weeks from now. Thanks.

## 2020-09-16 ENCOUNTER — PATIENT MESSAGE (OUTPATIENT)
Dept: RHEUMATOLOGY | Facility: CLINIC | Age: 45
End: 2020-09-16

## 2020-11-17 DIAGNOSIS — E03.9 HYPOTHYROIDISM: ICD-10-CM

## 2020-11-17 RX ORDER — LEVOTHYROXINE SODIUM 75 UG/1
75 TABLET ORAL DAILY
Qty: 90 TABLET | Refills: 3 | Status: SHIPPED | OUTPATIENT
Start: 2020-11-17 | End: 2021-04-28

## 2020-11-17 NOTE — TELEPHONE ENCOUNTER
Care Due:                  Date            Visit Type   Department     Provider  --------------------------------------------------------------------------------    Last Visit: None Found      None         None Found  Next Visit: None Scheduled  None         None Found                                                            Last  Test          Frequency    Reason                     Performed    Due Date  --------------------------------------------------------------------------------    Office Visit  12 months..  levothyroxine............  Not Found    Overdue    Powered by [employer]. Reference number: [id number]. 11/17/2020 12:56:51 PM   CST

## 2021-01-12 ENCOUNTER — OFFICE VISIT (OUTPATIENT)
Dept: RHEUMATOLOGY | Facility: CLINIC | Age: 46
End: 2021-01-12
Payer: COMMERCIAL

## 2021-01-12 VITALS
HEART RATE: 65 BPM | TEMPERATURE: 97 F | HEIGHT: 64 IN | DIASTOLIC BLOOD PRESSURE: 71 MMHG | OXYGEN SATURATION: 98 % | BODY MASS INDEX: 26.78 KG/M2 | SYSTOLIC BLOOD PRESSURE: 108 MMHG | WEIGHT: 156.88 LBS

## 2021-01-12 DIAGNOSIS — M19.041 PRIMARY OSTEOARTHRITIS OF BOTH HANDS: Primary | ICD-10-CM

## 2021-01-12 DIAGNOSIS — R51.9 CHRONIC NONINTRACTABLE HEADACHE, UNSPECIFIED HEADACHE TYPE: ICD-10-CM

## 2021-01-12 DIAGNOSIS — G89.29 CHRONIC NONINTRACTABLE HEADACHE, UNSPECIFIED HEADACHE TYPE: ICD-10-CM

## 2021-01-12 DIAGNOSIS — M19.042 PRIMARY OSTEOARTHRITIS OF BOTH HANDS: Primary | ICD-10-CM

## 2021-01-12 PROCEDURE — 1126F AMNT PAIN NOTED NONE PRSNT: CPT | Mod: S$GLB,,, | Performed by: INTERNAL MEDICINE

## 2021-01-12 PROCEDURE — 99214 OFFICE O/P EST MOD 30 MIN: CPT | Mod: S$GLB,,, | Performed by: INTERNAL MEDICINE

## 2021-01-12 PROCEDURE — 99999 PR PBB SHADOW E&M-EST. PATIENT-LVL IV: ICD-10-PCS | Mod: PBBFAC,,, | Performed by: INTERNAL MEDICINE

## 2021-01-12 PROCEDURE — 99999 PR PBB SHADOW E&M-EST. PATIENT-LVL IV: CPT | Mod: PBBFAC,,, | Performed by: INTERNAL MEDICINE

## 2021-01-12 PROCEDURE — 3008F BODY MASS INDEX DOCD: CPT | Mod: CPTII,S$GLB,, | Performed by: INTERNAL MEDICINE

## 2021-01-12 PROCEDURE — 1126F PR PAIN SEVERITY QUANTIFIED, NO PAIN PRESENT: ICD-10-PCS | Mod: S$GLB,,, | Performed by: INTERNAL MEDICINE

## 2021-01-12 PROCEDURE — 99214 PR OFFICE/OUTPT VISIT, EST, LEVL IV, 30-39 MIN: ICD-10-PCS | Mod: S$GLB,,, | Performed by: INTERNAL MEDICINE

## 2021-01-12 PROCEDURE — 3008F PR BODY MASS INDEX (BMI) DOCUMENTED: ICD-10-PCS | Mod: CPTII,S$GLB,, | Performed by: INTERNAL MEDICINE

## 2021-02-02 ENCOUNTER — PATIENT OUTREACH (OUTPATIENT)
Dept: ADMINISTRATIVE | Facility: OTHER | Age: 46
End: 2021-02-02

## 2021-02-03 ENCOUNTER — OFFICE VISIT (OUTPATIENT)
Dept: NEUROLOGY | Facility: CLINIC | Age: 46
End: 2021-02-03
Payer: COMMERCIAL

## 2021-02-03 DIAGNOSIS — M47.892 OTHER SPONDYLOSIS, CERVICAL REGION: ICD-10-CM

## 2021-02-03 DIAGNOSIS — G89.29 CHRONIC NONINTRACTABLE HEADACHE, UNSPECIFIED HEADACHE TYPE: ICD-10-CM

## 2021-02-03 DIAGNOSIS — M54.12 RADICULOPATHY, CERVICAL REGION: ICD-10-CM

## 2021-02-03 DIAGNOSIS — R51.9 CHRONIC NONINTRACTABLE HEADACHE, UNSPECIFIED HEADACHE TYPE: ICD-10-CM

## 2021-02-03 PROCEDURE — 99204 PR OFFICE/OUTPT VISIT, NEW, LEVL IV, 45-59 MIN: ICD-10-PCS | Mod: 95,,, | Performed by: PSYCHIATRY & NEUROLOGY

## 2021-02-03 PROCEDURE — 99204 OFFICE O/P NEW MOD 45 MIN: CPT | Mod: 95,,, | Performed by: PSYCHIATRY & NEUROLOGY

## 2021-02-03 RX ORDER — AMITRIPTYLINE HYDROCHLORIDE 10 MG/1
10 TABLET, FILM COATED ORAL NIGHTLY
Qty: 90 TABLET | Refills: 3 | Status: SHIPPED | OUTPATIENT
Start: 2021-02-03 | End: 2021-04-27 | Stop reason: ALTCHOICE

## 2021-02-03 RX ORDER — CYCLOBENZAPRINE HCL 5 MG
5 TABLET ORAL NIGHTLY PRN
Qty: 30 TABLET | Refills: 2 | Status: SHIPPED | OUTPATIENT
Start: 2021-02-03 | End: 2021-02-13

## 2021-02-23 ENCOUNTER — PATIENT MESSAGE (OUTPATIENT)
Dept: RHEUMATOLOGY | Facility: CLINIC | Age: 46
End: 2021-02-23

## 2021-02-25 ENCOUNTER — HOSPITAL ENCOUNTER (OUTPATIENT)
Dept: RADIOLOGY | Facility: HOSPITAL | Age: 46
Discharge: HOME | End: 2021-02-25
Attending: PSYCHIATRY & NEUROLOGY
Payer: COMMERCIAL

## 2021-02-25 DIAGNOSIS — M47.892 OTHER SPONDYLOSIS, CERVICAL REGION: ICD-10-CM

## 2021-02-25 DIAGNOSIS — M54.12 RADICULOPATHY, CERVICAL REGION: ICD-10-CM

## 2021-02-25 PROCEDURE — 72141 MRI CERVICAL SPINE WITHOUT CONTRAST: ICD-10-PCS | Mod: 26,,, | Performed by: RADIOLOGY

## 2021-02-25 PROCEDURE — 72141 MRI NECK SPINE W/O DYE: CPT | Mod: 26,,, | Performed by: RADIOLOGY

## 2021-02-25 PROCEDURE — 72141 MRI NECK SPINE W/O DYE: CPT | Mod: TC,PO

## 2021-02-26 ENCOUNTER — IMMUNIZATION (OUTPATIENT)
Dept: PRIMARY CARE CLINIC | Facility: CLINIC | Age: 46
End: 2021-02-26
Payer: COMMERCIAL

## 2021-02-26 DIAGNOSIS — Z23 NEED FOR VACCINATION: Primary | ICD-10-CM

## 2021-02-26 PROCEDURE — 0001A COVID-19, MRNA, LNP-S, PF, 30 MCG/0.3 ML DOSE VACCINE: CPT | Mod: S$GLB,,, | Performed by: FAMILY MEDICINE

## 2021-02-26 PROCEDURE — 0001A COVID-19, MRNA, LNP-S, PF, 30 MCG/0.3 ML DOSE VACCINE: ICD-10-PCS | Mod: S$GLB,,, | Performed by: FAMILY MEDICINE

## 2021-02-26 PROCEDURE — 91300 COVID-19, MRNA, LNP-S, PF, 30 MCG/0.3 ML DOSE VACCINE: CPT | Mod: S$GLB,,, | Performed by: FAMILY MEDICINE

## 2021-02-26 PROCEDURE — 91300 COVID-19, MRNA, LNP-S, PF, 30 MCG/0.3 ML DOSE VACCINE: ICD-10-PCS | Mod: S$GLB,,, | Performed by: FAMILY MEDICINE

## 2021-03-05 ENCOUNTER — PATIENT OUTREACH (OUTPATIENT)
Dept: ADMINISTRATIVE | Facility: HOSPITAL | Age: 46
End: 2021-03-05

## 2021-03-06 ENCOUNTER — PATIENT MESSAGE (OUTPATIENT)
Dept: NEUROLOGY | Facility: CLINIC | Age: 46
End: 2021-03-06

## 2021-03-08 DIAGNOSIS — M54.12 RADICULOPATHY, CERVICAL REGION: Primary | ICD-10-CM

## 2021-03-19 ENCOUNTER — PATIENT MESSAGE (OUTPATIENT)
Dept: NEUROLOGY | Facility: CLINIC | Age: 46
End: 2021-03-19

## 2021-03-19 ENCOUNTER — IMMUNIZATION (OUTPATIENT)
Dept: PRIMARY CARE CLINIC | Facility: CLINIC | Age: 46
End: 2021-03-19
Payer: COMMERCIAL

## 2021-03-19 DIAGNOSIS — Z23 NEED FOR VACCINATION: Primary | ICD-10-CM

## 2021-03-19 PROCEDURE — 91300 COVID-19, MRNA, LNP-S, PF, 30 MCG/0.3 ML DOSE VACCINE: CPT | Mod: S$GLB,,, | Performed by: FAMILY MEDICINE

## 2021-03-19 PROCEDURE — 91300 COVID-19, MRNA, LNP-S, PF, 30 MCG/0.3 ML DOSE VACCINE: ICD-10-PCS | Mod: S$GLB,,, | Performed by: FAMILY MEDICINE

## 2021-03-19 PROCEDURE — 0002A COVID-19, MRNA, LNP-S, PF, 30 MCG/0.3 ML DOSE VACCINE: CPT | Mod: CV19,S$GLB,, | Performed by: FAMILY MEDICINE

## 2021-03-19 PROCEDURE — 0002A COVID-19, MRNA, LNP-S, PF, 30 MCG/0.3 ML DOSE VACCINE: ICD-10-PCS | Mod: CV19,S$GLB,, | Performed by: FAMILY MEDICINE

## 2021-04-26 ENCOUNTER — PATIENT OUTREACH (OUTPATIENT)
Dept: ADMINISTRATIVE | Facility: OTHER | Age: 46
End: 2021-04-26

## 2021-04-27 ENCOUNTER — OFFICE VISIT (OUTPATIENT)
Dept: OBSTETRICS AND GYNECOLOGY | Facility: CLINIC | Age: 46
End: 2021-04-27
Payer: COMMERCIAL

## 2021-04-27 ENCOUNTER — LAB VISIT (OUTPATIENT)
Dept: LAB | Facility: HOSPITAL | Age: 46
End: 2021-04-27
Attending: OBSTETRICS & GYNECOLOGY
Payer: COMMERCIAL

## 2021-04-27 VITALS
SYSTOLIC BLOOD PRESSURE: 100 MMHG | BODY MASS INDEX: 26.05 KG/M2 | HEIGHT: 64 IN | DIASTOLIC BLOOD PRESSURE: 62 MMHG | WEIGHT: 152.56 LBS

## 2021-04-27 DIAGNOSIS — F52.0 HYPOACTIVE SEXUAL DESIRE: ICD-10-CM

## 2021-04-27 DIAGNOSIS — N89.8 VAGINAL DRYNESS: ICD-10-CM

## 2021-04-27 DIAGNOSIS — N95.1 PERIMENOPAUSE: ICD-10-CM

## 2021-04-27 DIAGNOSIS — R63.5 WEIGHT GAIN: ICD-10-CM

## 2021-04-27 DIAGNOSIS — Z01.419 ROUTINE GYNECOLOGICAL EXAMINATION: Primary | ICD-10-CM

## 2021-04-27 DIAGNOSIS — R53.83 FATIGUE, UNSPECIFIED TYPE: ICD-10-CM

## 2021-04-27 PROCEDURE — 99999 PR PBB SHADOW E&M-EST. PATIENT-LVL III: CPT | Mod: PBBFAC,,, | Performed by: OBSTETRICS & GYNECOLOGY

## 2021-04-27 PROCEDURE — 3008F PR BODY MASS INDEX (BMI) DOCUMENTED: ICD-10-PCS | Mod: CPTII,S$GLB,, | Performed by: OBSTETRICS & GYNECOLOGY

## 2021-04-27 PROCEDURE — 83001 ASSAY OF GONADOTROPIN (FSH): CPT | Performed by: OBSTETRICS & GYNECOLOGY

## 2021-04-27 PROCEDURE — 1126F AMNT PAIN NOTED NONE PRSNT: CPT | Mod: S$GLB,,, | Performed by: OBSTETRICS & GYNECOLOGY

## 2021-04-27 PROCEDURE — 1126F PR PAIN SEVERITY QUANTIFIED, NO PAIN PRESENT: ICD-10-PCS | Mod: S$GLB,,, | Performed by: OBSTETRICS & GYNECOLOGY

## 2021-04-27 PROCEDURE — 3008F BODY MASS INDEX DOCD: CPT | Mod: CPTII,S$GLB,, | Performed by: OBSTETRICS & GYNECOLOGY

## 2021-04-27 PROCEDURE — 87624 HPV HI-RISK TYP POOLED RSLT: CPT | Performed by: OBSTETRICS & GYNECOLOGY

## 2021-04-27 PROCEDURE — 82670 ASSAY OF TOTAL ESTRADIOL: CPT | Performed by: OBSTETRICS & GYNECOLOGY

## 2021-04-27 PROCEDURE — 84439 ASSAY OF FREE THYROXINE: CPT | Performed by: OBSTETRICS & GYNECOLOGY

## 2021-04-27 PROCEDURE — 99386 PREV VISIT NEW AGE 40-64: CPT | Mod: S$GLB,,, | Performed by: OBSTETRICS & GYNECOLOGY

## 2021-04-27 PROCEDURE — 99386 PR PREVENTIVE VISIT,NEW,40-64: ICD-10-PCS | Mod: S$GLB,,, | Performed by: OBSTETRICS & GYNECOLOGY

## 2021-04-27 PROCEDURE — 88142 CYTOPATH C/V THIN LAYER: CPT | Performed by: OBSTETRICS & GYNECOLOGY

## 2021-04-27 PROCEDURE — 99999 PR PBB SHADOW E&M-EST. PATIENT-LVL III: ICD-10-PCS | Mod: PBBFAC,,, | Performed by: OBSTETRICS & GYNECOLOGY

## 2021-04-27 PROCEDURE — 84443 ASSAY THYROID STIM HORMONE: CPT | Performed by: OBSTETRICS & GYNECOLOGY

## 2021-04-27 PROCEDURE — 36415 COLL VENOUS BLD VENIPUNCTURE: CPT | Mod: PO | Performed by: OBSTETRICS & GYNECOLOGY

## 2021-04-28 ENCOUNTER — PATIENT MESSAGE (OUTPATIENT)
Dept: OBSTETRICS AND GYNECOLOGY | Facility: CLINIC | Age: 46
End: 2021-04-28

## 2021-04-28 DIAGNOSIS — R79.89 TSH ELEVATION: Primary | ICD-10-CM

## 2021-04-28 LAB
ESTRADIOL SERPL-MCNC: 177 PG/ML
FSH SERPL-ACNC: 56.4 MIU/ML
T4 FREE SERPL-MCNC: 0.84 NG/DL (ref 0.71–1.51)
TSH SERPL DL<=0.005 MIU/L-ACNC: 4.92 UIU/ML (ref 0.4–4)

## 2021-04-28 RX ORDER — LEVOTHYROXINE SODIUM 100 UG/1
100 TABLET ORAL DAILY
Qty: 30 TABLET | Refills: 11 | Status: SHIPPED | OUTPATIENT
Start: 2021-04-28 | End: 2022-03-22 | Stop reason: SDUPTHER

## 2021-05-03 LAB
FINAL PATHOLOGIC DIAGNOSIS: NORMAL
Lab: NORMAL

## 2021-05-07 LAB
HPV HR 12 DNA SPEC QL NAA+PROBE: NEGATIVE
HPV16 AG SPEC QL: NEGATIVE
HPV18 DNA SPEC QL NAA+PROBE: NEGATIVE

## 2021-06-09 ENCOUNTER — LAB VISIT (OUTPATIENT)
Dept: LAB | Facility: HOSPITAL | Age: 46
End: 2021-06-09
Attending: OBSTETRICS & GYNECOLOGY
Payer: COMMERCIAL

## 2021-06-09 DIAGNOSIS — R79.89 TSH ELEVATION: ICD-10-CM

## 2021-06-09 PROCEDURE — 84443 ASSAY THYROID STIM HORMONE: CPT | Performed by: OBSTETRICS & GYNECOLOGY

## 2021-06-09 PROCEDURE — 36415 COLL VENOUS BLD VENIPUNCTURE: CPT | Mod: PO | Performed by: OBSTETRICS & GYNECOLOGY

## 2021-06-09 PROCEDURE — 84439 ASSAY OF FREE THYROXINE: CPT | Performed by: OBSTETRICS & GYNECOLOGY

## 2021-06-10 LAB
T4 FREE SERPL-MCNC: 1.3 NG/DL (ref 0.71–1.51)
TSH SERPL DL<=0.005 MIU/L-ACNC: 0.35 UIU/ML (ref 0.4–4)

## 2021-12-14 ENCOUNTER — IMMUNIZATION (OUTPATIENT)
Dept: FAMILY MEDICINE | Facility: CLINIC | Age: 46
End: 2021-12-14
Payer: COMMERCIAL

## 2021-12-14 DIAGNOSIS — Z23 NEED FOR VACCINATION: Primary | ICD-10-CM

## 2021-12-14 PROCEDURE — 0004A COVID-19, MRNA, LNP-S, PF, 30 MCG/0.3 ML DOSE VACCINE: CPT | Mod: PBBFAC | Performed by: RADIOLOGY

## 2022-03-22 RX ORDER — LEVOTHYROXINE SODIUM 100 UG/1
100 TABLET ORAL DAILY
Qty: 30 TABLET | Refills: 0 | Status: SHIPPED | OUTPATIENT
Start: 2022-03-22 | End: 2022-10-11

## 2022-03-22 RX ORDER — LEVOTHYROXINE SODIUM 100 UG/1
TABLET ORAL
Qty: 30 TABLET | Refills: 11 | OUTPATIENT
Start: 2022-03-22

## 2022-03-22 NOTE — TELEPHONE ENCOUNTER
----- Message from Letitia Latham sent at 3/22/2022  9:36 AM CDT -----  Patient is calling because [facility] contacted her regarding the thyroid medication.  They said her refill was not authorized.  Please call her regarding this at [phone].  Thank you

## 2022-03-25 ENCOUNTER — PATIENT MESSAGE (OUTPATIENT)
Dept: OBSTETRICS AND GYNECOLOGY | Facility: CLINIC | Age: 47
End: 2022-03-25
Payer: COMMERCIAL

## 2022-03-25 DIAGNOSIS — Z12.31 ENCOUNTER FOR SCREENING MAMMOGRAM FOR BREAST CANCER: Primary | ICD-10-CM

## 2022-06-24 ENCOUNTER — TELEPHONE (OUTPATIENT)
Dept: OBSTETRICS AND GYNECOLOGY | Facility: CLINIC | Age: 47
End: 2022-06-24
Payer: COMMERCIAL

## 2022-06-24 NOTE — TELEPHONE ENCOUNTER
----- Message from Miguel Ann sent at 6/24/2022  8:50 AM CDT -----  Type:  Sooner Appointment Request    Caller is requesting a sooner appointment.  Caller declined first available appointment listed below.  Caller will not accept being placed on the waitlist and is requesting a message be sent to doctor.    Name of Caller:  pt  When is the first available appointment?  8/16  Symptoms:  pt have started bleeding and last period was 2021 and have adb pain on the left side   Best Call Back Number:  [phone] (home) [phone] (work)    Additional Information:  please advise--thank you!

## 2022-06-24 NOTE — TELEPHONE ENCOUNTER
Spoke with pt in reference to scheduling an appointment for, No period since 2/2021 and started bleeding 6/20/22 with left side pain, Vaginal dryness, discomfort on 6/27/22 at 10:40 am with Dr Edwards. Pt verbalized understanding.

## 2022-06-27 ENCOUNTER — OFFICE VISIT (OUTPATIENT)
Dept: OBSTETRICS AND GYNECOLOGY | Facility: CLINIC | Age: 47
End: 2022-06-27
Payer: COMMERCIAL

## 2022-06-27 VITALS
HEIGHT: 64 IN | SYSTOLIC BLOOD PRESSURE: 100 MMHG | DIASTOLIC BLOOD PRESSURE: 60 MMHG | WEIGHT: 132.5 LBS | BODY MASS INDEX: 22.62 KG/M2

## 2022-06-27 DIAGNOSIS — F52.0 HYPOACTIVE SEXUAL DESIRE: ICD-10-CM

## 2022-06-27 DIAGNOSIS — Z98.890 S/P ENDOMETRIAL ABLATION: ICD-10-CM

## 2022-06-27 DIAGNOSIS — Z79.890 HORMONE REPLACEMENT THERAPY (HRT): ICD-10-CM

## 2022-06-27 DIAGNOSIS — R23.2 HOT FLASHES: ICD-10-CM

## 2022-06-27 DIAGNOSIS — N92.1 METRORRHAGIA: ICD-10-CM

## 2022-06-27 DIAGNOSIS — N95.1 PERIMENOPAUSE: Primary | ICD-10-CM

## 2022-06-27 PROCEDURE — 1159F MED LIST DOCD IN RCRD: CPT | Mod: CPTII,S$GLB,, | Performed by: OBSTETRICS & GYNECOLOGY

## 2022-06-27 PROCEDURE — 3008F PR BODY MASS INDEX (BMI) DOCUMENTED: ICD-10-PCS | Mod: CPTII,S$GLB,, | Performed by: OBSTETRICS & GYNECOLOGY

## 2022-06-27 PROCEDURE — 99214 PR OFFICE/OUTPT VISIT, EST, LEVL IV, 30-39 MIN: ICD-10-PCS | Mod: S$GLB,,, | Performed by: OBSTETRICS & GYNECOLOGY

## 2022-06-27 PROCEDURE — 99214 OFFICE O/P EST MOD 30 MIN: CPT | Mod: S$GLB,,, | Performed by: OBSTETRICS & GYNECOLOGY

## 2022-06-27 PROCEDURE — 3074F SYST BP LT 130 MM HG: CPT | Mod: CPTII,S$GLB,, | Performed by: OBSTETRICS & GYNECOLOGY

## 2022-06-27 PROCEDURE — 3078F PR MOST RECENT DIASTOLIC BLOOD PRESSURE < 80 MM HG: ICD-10-PCS | Mod: CPTII,S$GLB,, | Performed by: OBSTETRICS & GYNECOLOGY

## 2022-06-27 PROCEDURE — 3008F BODY MASS INDEX DOCD: CPT | Mod: CPTII,S$GLB,, | Performed by: OBSTETRICS & GYNECOLOGY

## 2022-06-27 PROCEDURE — 1159F PR MEDICATION LIST DOCUMENTED IN MEDICAL RECORD: ICD-10-PCS | Mod: CPTII,S$GLB,, | Performed by: OBSTETRICS & GYNECOLOGY

## 2022-06-27 PROCEDURE — 3078F DIAST BP <80 MM HG: CPT | Mod: CPTII,S$GLB,, | Performed by: OBSTETRICS & GYNECOLOGY

## 2022-06-27 PROCEDURE — 3074F PR MOST RECENT SYSTOLIC BLOOD PRESSURE < 130 MM HG: ICD-10-PCS | Mod: CPTII,S$GLB,, | Performed by: OBSTETRICS & GYNECOLOGY

## 2022-06-27 PROCEDURE — 99999 PR PBB SHADOW E&M-EST. PATIENT-LVL IV: CPT | Mod: PBBFAC,,, | Performed by: OBSTETRICS & GYNECOLOGY

## 2022-06-27 PROCEDURE — 99999 PR PBB SHADOW E&M-EST. PATIENT-LVL IV: ICD-10-PCS | Mod: PBBFAC,,, | Performed by: OBSTETRICS & GYNECOLOGY

## 2022-06-27 RX ORDER — SEMAGLUTIDE 1.34 MG/ML
0.25 INJECTION, SOLUTION SUBCUTANEOUS
COMMUNITY

## 2022-06-27 RX ORDER — ESTRADIOL 0.5 MG/.5G
1 GEL TOPICAL DAILY
Qty: 30 PACKET | Refills: 11 | Status: SHIPPED | OUTPATIENT
Start: 2022-06-27 | End: 2022-07-01

## 2022-06-27 RX ORDER — PROGESTERONE 200 MG/1
200 CAPSULE ORAL NIGHTLY
COMMUNITY
Start: 2022-06-20 | End: 2022-06-27 | Stop reason: SDUPTHER

## 2022-06-27 RX ORDER — PROGESTERONE 200 MG/1
200 CAPSULE ORAL NIGHTLY
Qty: 30 CAPSULE | Refills: 11 | Status: SHIPPED | OUTPATIENT
Start: 2022-06-27 | End: 2023-02-28 | Stop reason: SDUPTHER

## 2022-06-27 NOTE — PROGRESS NOTES
Chief Complaint   Patient presents with    no cycle in 16 months then bleeding       History of Present Illness: Brenna Soni is a 46 y.o. female that presents today 2022 for   Chief Complaint   Patient presents with    no cycle in 16 months then bleeding     She reports  spotting then 4 days later large clots for 6 days.   She went 16 months with no menses.  She reports left lower abdominal pain that improved. She reports  started with hot flashes and does Estrogen/Testo CMPD and Prometrium. Biest 80/20 with Testo 1.5 mg.   S/P ablation  with regular cycles until .   She reports that she has had EMB attempt  with Dr Shah with no success at those attempts.  She reports no successful sample and she tried cytotec to softener  First too. She reports at this time with was trying HRT with Dr. Shah.       Past Medical History:   Diagnosis Date    Abnormal Pap smear of cervix     cryo    Anal fissure     Hypothyroid 2013    Localized adiposity     trunk    Rectal bleeding     TBI (traumatic brain injury)     concussion, CT done.      Vertigo     Water retention     takes diuretic       Past Surgical History:   Procedure Laterality Date    AUGMENTATION OF BREAST Bilateral     Lift also    BREAST SURGERY      augmentation     SECTION      COSMETIC SURGERY  2015    tummy tuck    gastric sleeve      laparoscopic abdominal      X2 for endometriosis    Tummy tuck      uterine ablation         Current Outpatient Medications   Medication Sig Dispense Refill    levothyroxine (SYNTHROID) 100 MCG tablet Take 1 tablet (100 mcg total) by mouth once daily. 30 tablet 0    semaglutide (OZEMPIC) 0.25 mg or 0.5 mg(2 mg/1.5 mL) pen injector Inject into the skin every 7 days.      UNABLE TO FIND Bi est 80/20/ test 1.5mg/ gm cream      estradioL (DIVIGEL) 0.5 mg/0.5 gram (0.1 %) GlPk Place 1 packet onto the skin once daily. 30 packet 11    progesterone  "(PROMETRIUM) 200 MG capsule Take 1 capsule (200 mg total) by mouth nightly. 30 capsule 11     No current facility-administered medications for this visit.       Review of patient's allergies indicates:  No Known Allergies    Family History   Adopted: Yes       Social History     Tobacco Use    Smoking status: Never Smoker    Smokeless tobacco: Never Used   Substance Use Topics    Alcohol use: Yes     Comment: 2-3 glasses of wine/week    Drug use: No       OB History    Para Term  AB Living   1 1 1         SAB IAB Ectopic Multiple Live Births                  # Outcome Date GA Lbr Dmitriy/2nd Weight Sex Delivery Anes PTL Lv   1 Term                  /60   Ht 5' 4" (1.626 m)   Wt 60.1 kg (132 lb 7.9 oz)   LMP 2021   Physical Exam:  APPEARANCE: Well nourished, well developed, in no acute distress.  SKIN: Normal skin turgor, no lesions.  NECK: Neck symmetric without masses   RESPIRATORY: Normal respiratory effort with no retractions or use of accessory muscles  CARDIOVASCULAR: Peripheral vascular system with no swelling no varicosities and palpation of pulses normal  LYMPHATIC: No enlargements of the lymph nodes noted in the neck, axillae, or groin  ABDOMEN: Soft. No tenderness or masses. No hepatosplenomegaly. No hernias.  PELVIC: Normal external female genitalia without lesions. Normal hair distribution. Adequate perineal body, normal urethral meatus. Urethra with no masses.  Bladder nontender. Vagina moist and well rugated without lesions or discharge. Cervix pink and without lesions. No significant cystocele or rectocele. Bimanual exam showed uterus normal size, shape, position, mobile and nontender. Adnexa without masses or tenderness. Urethra and bladder normal.  EXTREMITIES: No clubbing cyanosis or edema.    ASSESSMENT/PLAN:  Perimenopause  -     US Pelvis Comp with Transvag NON-OB (xpd; Future; Expected date: 2022  -     Case Request Operating Room: HYSTEROSCOPY, WITH DILATION " AND CURETTAGE OF UTERUS  -     progesterone (PROMETRIUM) 200 MG capsule; Take 1 capsule (200 mg total) by mouth nightly.  Dispense: 30 capsule; Refill: 11  -     estradioL (DIVIGEL) 0.5 mg/0.5 gram (0.1 %) GlPk; Place 1 packet onto the skin once daily.  Dispense: 30 packet; Refill: 11    Metrorrhagia  -     US Pelvis Comp with Transvag NON-OB (xpd; Future; Expected date: 06/27/2022  -     Case Request Operating Room: HYSTEROSCOPY, WITH DILATION AND CURETTAGE OF UTERUS  -     progesterone (PROMETRIUM) 200 MG capsule; Take 1 capsule (200 mg total) by mouth nightly.  Dispense: 30 capsule; Refill: 11  -     estradioL (DIVIGEL) 0.5 mg/0.5 gram (0.1 %) GlPk; Place 1 packet onto the skin once daily.  Dispense: 30 packet; Refill: 11    Hormone replacement therapy (HRT)  -     US Pelvis Comp with Transvag NON-OB (xpd; Future; Expected date: 06/27/2022  -     Case Request Operating Room: HYSTEROSCOPY, WITH DILATION AND CURETTAGE OF UTERUS  -     progesterone (PROMETRIUM) 200 MG capsule; Take 1 capsule (200 mg total) by mouth nightly.  Dispense: 30 capsule; Refill: 11  -     estradioL (DIVIGEL) 0.5 mg/0.5 gram (0.1 %) GlPk; Place 1 packet onto the skin once daily.  Dispense: 30 packet; Refill: 11    Hot flashes  -     progesterone (PROMETRIUM) 200 MG capsule; Take 1 capsule (200 mg total) by mouth nightly.  Dispense: 30 capsule; Refill: 11  -     estradioL (DIVIGEL) 0.5 mg/0.5 gram (0.1 %) GlPk; Place 1 packet onto the skin once daily.  Dispense: 30 packet; Refill: 11    S/P endometrial ablation  -     US Pelvis Comp with Transvag NON-OB (xpd; Future; Expected date: 06/27/2022  -     Case Request Operating Room: HYSTEROSCOPY, WITH DILATION AND CURETTAGE OF UTERUS  -     progesterone (PROMETRIUM) 200 MG capsule; Take 1 capsule (200 mg total) by mouth nightly.  Dispense: 30 capsule; Refill: 11  -     estradioL (DIVIGEL) 0.5 mg/0.5 gram (0.1 %) GlPk; Place 1 packet onto the skin once daily.  Dispense: 30 packet; Refill:  11    Hypoactive sexual desire        Change from CMPD to divigel  Plan USG  Plan hysteroscopy D&C to rule out hyperplasia/uterine CA    30 minutes spent today spent preparing reviewing previous external notes, reviewing previous results, performing medical examination, ordering tests or medications, counseling and documenting.

## 2022-06-30 ENCOUNTER — HOSPITAL ENCOUNTER (OUTPATIENT)
Dept: RADIOLOGY | Facility: HOSPITAL | Age: 47
Discharge: HOME | End: 2022-06-30
Attending: OBSTETRICS & GYNECOLOGY
Payer: COMMERCIAL

## 2022-06-30 DIAGNOSIS — Z98.890 S/P ENDOMETRIAL ABLATION: ICD-10-CM

## 2022-06-30 DIAGNOSIS — N92.1 METRORRHAGIA: ICD-10-CM

## 2022-06-30 DIAGNOSIS — Z79.890 HORMONE REPLACEMENT THERAPY (HRT): ICD-10-CM

## 2022-06-30 DIAGNOSIS — N95.1 PERIMENOPAUSE: ICD-10-CM

## 2022-06-30 PROCEDURE — 76830 TRANSVAGINAL US NON-OB: CPT | Mod: TC,PO

## 2022-06-30 PROCEDURE — 76830 TRANSVAGINAL US NON-OB: CPT | Mod: 26,,, | Performed by: RADIOLOGY

## 2022-06-30 PROCEDURE — 76856 US PELVIS COMP WITH TRANSVAG NON-OB (XPD): ICD-10-PCS | Mod: 26,,, | Performed by: RADIOLOGY

## 2022-06-30 PROCEDURE — 76830 US PELVIS COMP WITH TRANSVAG NON-OB (XPD): ICD-10-PCS | Mod: 26,,, | Performed by: RADIOLOGY

## 2022-06-30 PROCEDURE — 76856 US EXAM PELVIC COMPLETE: CPT | Mod: 26,,, | Performed by: RADIOLOGY

## 2022-07-01 ENCOUNTER — PATIENT MESSAGE (OUTPATIENT)
Dept: OBSTETRICS AND GYNECOLOGY | Facility: CLINIC | Age: 47
End: 2022-07-01
Payer: COMMERCIAL

## 2022-07-01 RX ORDER — ESTRADIOL 0.05 MG/D
1 FILM, EXTENDED RELEASE TRANSDERMAL
Qty: 8 PATCH | Refills: 11 | Status: SHIPPED | OUTPATIENT
Start: 2022-07-04 | End: 2022-10-20

## 2022-07-18 ENCOUNTER — OFFICE VISIT (OUTPATIENT)
Dept: OBSTETRICS AND GYNECOLOGY | Facility: CLINIC | Age: 47
End: 2022-07-18
Payer: COMMERCIAL

## 2022-07-18 VITALS — WEIGHT: 130.94 LBS | HEIGHT: 64 IN | BODY MASS INDEX: 22.35 KG/M2

## 2022-07-18 DIAGNOSIS — N95.1 PERIMENOPAUSE: ICD-10-CM

## 2022-07-18 DIAGNOSIS — N92.6 IRREGULAR BLEEDING: ICD-10-CM

## 2022-07-18 DIAGNOSIS — N92.1 METRORRHAGIA: Primary | ICD-10-CM

## 2022-07-18 DIAGNOSIS — Z98.890 S/P ENDOMETRIAL ABLATION: ICD-10-CM

## 2022-07-18 DIAGNOSIS — Z79.890 HORMONE REPLACEMENT THERAPY (HRT): ICD-10-CM

## 2022-07-18 PROCEDURE — 3008F PR BODY MASS INDEX (BMI) DOCUMENTED: ICD-10-PCS | Mod: CPTII,S$GLB,, | Performed by: OBSTETRICS & GYNECOLOGY

## 2022-07-18 PROCEDURE — 99999 PR PBB SHADOW E&M-EST. PATIENT-LVL III: CPT | Mod: PBBFAC,,, | Performed by: OBSTETRICS & GYNECOLOGY

## 2022-07-18 PROCEDURE — 3008F BODY MASS INDEX DOCD: CPT | Mod: CPTII,S$GLB,, | Performed by: OBSTETRICS & GYNECOLOGY

## 2022-07-18 PROCEDURE — 1159F MED LIST DOCD IN RCRD: CPT | Mod: CPTII,S$GLB,, | Performed by: OBSTETRICS & GYNECOLOGY

## 2022-07-18 PROCEDURE — 99499 NO LOS: ICD-10-PCS | Mod: S$GLB,,, | Performed by: OBSTETRICS & GYNECOLOGY

## 2022-07-18 PROCEDURE — 1159F PR MEDICATION LIST DOCUMENTED IN MEDICAL RECORD: ICD-10-PCS | Mod: CPTII,S$GLB,, | Performed by: OBSTETRICS & GYNECOLOGY

## 2022-07-18 PROCEDURE — 99499 UNLISTED E&M SERVICE: CPT | Mod: S$GLB,,, | Performed by: OBSTETRICS & GYNECOLOGY

## 2022-07-18 PROCEDURE — 99999 PR PBB SHADOW E&M-EST. PATIENT-LVL III: ICD-10-PCS | Mod: PBBFAC,,, | Performed by: OBSTETRICS & GYNECOLOGY

## 2022-07-18 RX ORDER — SODIUM CHLORIDE, SODIUM LACTATE, POTASSIUM CHLORIDE, CALCIUM CHLORIDE 600; 310; 30; 20 MG/100ML; MG/100ML; MG/100ML; MG/100ML
INJECTION, SOLUTION INTRAVENOUS CONTINUOUS
Status: CANCELLED | OUTPATIENT
Start: 2022-07-18

## 2022-07-18 NOTE — PROGRESS NOTES
Chief Complaint   Patient presents with    Pre-op Exam       History of Present Illness: Brenna Soni is a 46 y.o. female that presents today 2022 for   Chief Complaint   Patient presents with    Pre-op Exam         Past Medical History:   Diagnosis Date    Abnormal Pap smear of cervix     cryo    Anal fissure     Hypothyroid 2013    Localized adiposity     trunk    Rectal bleeding     TBI (traumatic brain injury)     concussion, CT done.      Vertigo     Water retention     takes diuretic       Past Surgical History:   Procedure Laterality Date    AUGMENTATION OF BREAST Bilateral     Lift also    BREAST SURGERY      augmentation     SECTION      COSMETIC SURGERY  2015    tummy tuck    gastric sleeve      laparoscopic abdominal      X2 for endometriosis    Tummy tuck      uterine ablation         Current Outpatient Medications   Medication Sig Dispense Refill    estradiol 0.05 mg/24 hr td ptsw (VIVELLE-DOT) 0.05 mg/24 hr Place 1 patch onto the skin twice a week. 8 patch 11    levothyroxine (SYNTHROID) 100 MCG tablet Take 1 tablet (100 mcg total) by mouth once daily. 30 tablet 0    progesterone (PROMETRIUM) 200 MG capsule Take 1 capsule (200 mg total) by mouth nightly. 30 capsule 11    semaglutide (OZEMPIC) 0.25 mg or 0.5 mg(2 mg/1.5 mL) pen injector Inject into the skin every 7 days.      UNABLE TO FIND Bi est 80/20/ test 1.5mg/ gm cream       No current facility-administered medications for this visit.       Review of patient's allergies indicates:  No Known Allergies    Family History   Adopted: Yes       Social History     Tobacco Use    Smoking status: Never Smoker    Smokeless tobacco: Never Used   Substance Use Topics    Alcohol use: Yes     Comment: 2-3 glasses of wine/week    Drug use: No       OB History    Para Term  AB Living   1 1 1         SAB IAB Ectopic Multiple Live Births                  # Outcome Date GA Lbr  "Dmitriy/2nd Weight Sex Delivery Anes PTL Lv   1 Term                  Ht 5' 4" (1.626 m)   Wt 59.4 kg (130 lb 15.3 oz)   LMP 02/02/2021   Physical Exam:  APPEARANCE: Well nourished, well developed, in no acute distress.  SKIN: Normal skin turgor, no lesions.  NECK: Neck symmetric without masses   RESPIRATORY: Normal respiratory effort with no retractions or use of accessory muscles  CARDIOVASCULAR: Peripheral vascular system with no swelling no varicosities and palpation of pulses normal  LYMPHATIC: No enlargements of the lymph nodes noted in the neck, axillae, or groin  ABDOMEN: Soft. No tenderness or masses. No hepatosplenomegaly. No hernias.  PELVIC: Normal external female genitalia without lesions. Normal hair distribution. Adequate perineal body, normal urethral meatus. Urethra with no masses.  Bladder nontender. Vagina moist and well rugated without lesions or discharge. Cervix pink and without lesions. No significant cystocele or rectocele. Bimanual exam showed uterus normal size, shape, position, mobile and nontender. Adnexa without masses or tenderness. Urethra and bladder normal.  EXTREMITIES: No clubbing cyanosis or edema.    ASSESSMENT/PLAN:  Metrorrhagia  -     Full code; Standing  -     Place in Outpatient; Standing  -     Insert peripheral IV; Standing  -     Downey to Gravity; Standing  -     POCT glucose; Standing  -     Notify physician if BS > 180 for hysterectomy patients; Standing  -     Chlorhexidine (CHG) 2% Wipes; Standing  -     Notify Physician/Vital Signs Parameters Urine output less than 0.5mL/kg/hr (with indwelling catheter) or 30 mL/hr (without indwelling catheter) or blood glucose greater than 200 mg/dL; Standing  -     Notify physician; Standing  -     Notify Physician - Potential Need of Opioid Reversal; Standing  -     Diet NPO; Standing  -     Chlorohexidine Gluconate Bath; Standing  -     hCG, quantitative; Standing  -     CBC auto differential; Standing  -     Type & Screen Pre Op; " Standing  -     EKG 12-lead; Standing  -     X-Ray Chest PA And Lateral; Standing  -     Place sequential compression device; Standing    Perimenopause    S/P endometrial ablation  -     Full code; Standing  -     Place in Outpatient; Standing  -     Insert peripheral IV; Standing  -     Downey to Gravity; Standing  -     POCT glucose; Standing  -     Notify physician if BS > 180 for hysterectomy patients; Standing  -     Chlorhexidine (CHG) 2% Wipes; Standing  -     Notify Physician/Vital Signs Parameters Urine output less than 0.5mL/kg/hr (with indwelling catheter) or 30 mL/hr (without indwelling catheter) or blood glucose greater than 200 mg/dL; Standing  -     Notify physician; Standing  -     Notify Physician - Potential Need of Opioid Reversal; Standing  -     Diet NPO; Standing  -     Chlorohexidine Gluconate Bath; Standing  -     hCG, quantitative; Standing  -     CBC auto differential; Standing  -     Type & Screen Pre Op; Standing  -     EKG 12-lead; Standing  -     X-Ray Chest PA And Lateral; Standing  -     Place sequential compression device; Standing    Hormone replacement therapy (HRT)  -     Full code; Standing  -     Place in Outpatient; Standing  -     Insert peripheral IV; Standing  -     Downey to Gravity; Standing  -     POCT glucose; Standing  -     Notify physician if BS > 180 for hysterectomy patients; Standing  -     Chlorhexidine (CHG) 2% Wipes; Standing  -     Notify Physician/Vital Signs Parameters Urine output less than 0.5mL/kg/hr (with indwelling catheter) or 30 mL/hr (without indwelling catheter) or blood glucose greater than 200 mg/dL; Standing  -     Notify physician; Standing  -     Notify Physician - Potential Need of Opioid Reversal; Standing  -     Diet NPO; Standing  -     Chlorohexidine Gluconate Bath; Standing  -     hCG, quantitative; Standing  -     CBC auto differential; Standing  -     Type & Screen Pre Op; Standing  -     EKG 12-lead; Standing  -     X-Ray Chest PA And  Lateral; Standing  -     Place sequential compression device; Standing    Irregular bleeding    Other orders  -     lactated ringers infusion  -     IP VTE LOW RISK PATIENT; Standing        Will move forward with hysteroscopy D&C

## 2022-07-20 PROBLEM — N95.1 PERIMENOPAUSE: Status: ACTIVE | Noted: 2022-07-20

## 2022-07-20 PROBLEM — N95.0 POSTMENOPAUSAL BLEEDING: Status: ACTIVE | Noted: 2022-07-20

## 2022-07-20 PROBLEM — Z79.890 HORMONE REPLACEMENT THERAPY (HRT): Status: ACTIVE | Noted: 2022-07-20

## 2022-07-20 PROBLEM — Z98.890 S/P ENDOMETRIAL ABLATION: Status: ACTIVE | Noted: 2022-07-20
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
## 2022-10-06 DIAGNOSIS — M25.562 ACUTE PAIN OF LEFT KNEE: Primary | ICD-10-CM

## 2022-10-10 ENCOUNTER — OFFICE VISIT (OUTPATIENT)
Dept: ORTHOPEDICS | Facility: CLINIC | Age: 47
End: 2022-10-10
Payer: COMMERCIAL

## 2022-10-10 ENCOUNTER — HOSPITAL ENCOUNTER (OUTPATIENT)
Dept: RADIOLOGY | Facility: HOSPITAL | Age: 47
Discharge: HOME | End: 2022-10-10
Attending: ORTHOPAEDIC SURGERY
Payer: COMMERCIAL

## 2022-10-10 VITALS — WEIGHT: 130 LBS | BODY MASS INDEX: 22.2 KG/M2 | HEIGHT: 64 IN

## 2022-10-10 DIAGNOSIS — M25.552 LEFT HIP PAIN: Primary | ICD-10-CM

## 2022-10-10 DIAGNOSIS — M25.562 LEFT KNEE PAIN: Primary | ICD-10-CM

## 2022-10-10 DIAGNOSIS — M25.552 LEFT HIP PAIN: ICD-10-CM

## 2022-10-10 DIAGNOSIS — M25.562 ACUTE PAIN OF LEFT KNEE: ICD-10-CM

## 2022-10-10 PROCEDURE — 73502 X-RAY EXAM HIP UNI 2-3 VIEWS: CPT | Mod: TC,PO,LT

## 2022-10-10 PROCEDURE — 1160F PR REVIEW ALL MEDS BY PRESCRIBER/CLIN PHARMACIST DOCUMENTED: ICD-10-PCS | Mod: CPTII,S$GLB,, | Performed by: ORTHOPAEDIC SURGERY

## 2022-10-10 PROCEDURE — 73560 XR KNEE ORTHO LEFT: ICD-10-PCS | Mod: 26,RT,, | Performed by: RADIOLOGY

## 2022-10-10 PROCEDURE — 99213 PR OFFICE/OUTPT VISIT, EST, LEVL III, 20-29 MIN: ICD-10-PCS | Mod: S$GLB,,, | Performed by: ORTHOPAEDIC SURGERY

## 2022-10-10 PROCEDURE — 73562 X-RAY EXAM OF KNEE 3: CPT | Mod: TC,PO,LT

## 2022-10-10 PROCEDURE — 3008F PR BODY MASS INDEX (BMI) DOCUMENTED: ICD-10-PCS | Mod: CPTII,S$GLB,, | Performed by: ORTHOPAEDIC SURGERY

## 2022-10-10 PROCEDURE — 73502 X-RAY EXAM HIP UNI 2-3 VIEWS: CPT | Mod: 26,LT,, | Performed by: RADIOLOGY

## 2022-10-10 PROCEDURE — 73562 XR KNEE ORTHO LEFT: ICD-10-PCS | Mod: 26,LT,, | Performed by: RADIOLOGY

## 2022-10-10 PROCEDURE — 99999 PR PBB SHADOW E&M-EST. PATIENT-LVL III: CPT | Mod: PBBFAC,,, | Performed by: ORTHOPAEDIC SURGERY

## 2022-10-10 PROCEDURE — 3008F BODY MASS INDEX DOCD: CPT | Mod: CPTII,S$GLB,, | Performed by: ORTHOPAEDIC SURGERY

## 2022-10-10 PROCEDURE — 99213 OFFICE O/P EST LOW 20 MIN: CPT | Mod: S$GLB,,, | Performed by: ORTHOPAEDIC SURGERY

## 2022-10-10 PROCEDURE — 73560 X-RAY EXAM OF KNEE 1 OR 2: CPT | Mod: TC,59,PO,RT

## 2022-10-10 PROCEDURE — 99999 PR PBB SHADOW E&M-EST. PATIENT-LVL III: ICD-10-PCS | Mod: PBBFAC,,, | Performed by: ORTHOPAEDIC SURGERY

## 2022-10-10 PROCEDURE — 73502 XR HIP WITH PELVIS WHEN PERFORMED, 2 OR 3 VIEWS LEFT: ICD-10-PCS | Mod: 26,LT,, | Performed by: RADIOLOGY

## 2022-10-10 PROCEDURE — 73560 X-RAY EXAM OF KNEE 1 OR 2: CPT | Mod: 26,RT,, | Performed by: RADIOLOGY

## 2022-10-10 PROCEDURE — 1159F MED LIST DOCD IN RCRD: CPT | Mod: CPTII,S$GLB,, | Performed by: ORTHOPAEDIC SURGERY

## 2022-10-10 PROCEDURE — 73562 X-RAY EXAM OF KNEE 3: CPT | Mod: 26,LT,, | Performed by: RADIOLOGY

## 2022-10-10 PROCEDURE — 1159F PR MEDICATION LIST DOCUMENTED IN MEDICAL RECORD: ICD-10-PCS | Mod: CPTII,S$GLB,, | Performed by: ORTHOPAEDIC SURGERY

## 2022-10-10 PROCEDURE — 1160F RVW MEDS BY RX/DR IN RCRD: CPT | Mod: CPTII,S$GLB,, | Performed by: ORTHOPAEDIC SURGERY

## 2022-10-10 NOTE — PROGRESS NOTES
47 years old left knee pain for about 3 weeks time no one time traumatic event stabbing throbbing pain came on somewhat suddenly notices pain when she goes to stand after she has been seated mostly on the lateral aspect posterior lateral aspect of the knee    Exam shows some tenderness throughout the knee no signs infection or instability negative Dheeraj testing hip range of motion is slightly uncomfortable    X-rays of the hip and knee are negative     Assessment:  Synovitis left knee    Plan:  Symptomatic care, long-term strengthening, follow-up as needed

## 2022-10-19 ENCOUNTER — PATIENT MESSAGE (OUTPATIENT)
Dept: FAMILY MEDICINE | Facility: CLINIC | Age: 47
End: 2022-10-19
Payer: COMMERCIAL

## 2022-10-20 ENCOUNTER — PATIENT MESSAGE (OUTPATIENT)
Dept: OBSTETRICS AND GYNECOLOGY | Facility: CLINIC | Age: 47
End: 2022-10-20
Payer: COMMERCIAL

## 2022-10-20 RX ORDER — ESTRADIOL 0.75 MG/.75G
0.75 GEL TOPICAL DAILY
Qty: 30 PACKET | Refills: 11 | Status: SHIPPED | OUTPATIENT
Start: 2022-10-20 | End: 2023-06-26

## 2022-10-21 ENCOUNTER — PATIENT MESSAGE (OUTPATIENT)
Dept: OBSTETRICS AND GYNECOLOGY | Facility: CLINIC | Age: 47
End: 2022-10-21
Payer: COMMERCIAL

## 2022-10-23 ENCOUNTER — PATIENT MESSAGE (OUTPATIENT)
Dept: FAMILY MEDICINE | Facility: CLINIC | Age: 47
End: 2022-10-23
Payer: COMMERCIAL

## 2022-10-23 DIAGNOSIS — Z12.11 COLON CANCER SCREENING: Primary | ICD-10-CM

## 2022-10-24 ENCOUNTER — PATIENT MESSAGE (OUTPATIENT)
Dept: FAMILY MEDICINE | Facility: CLINIC | Age: 47
End: 2022-10-24
Payer: COMMERCIAL

## 2022-11-04 ENCOUNTER — TELEPHONE (OUTPATIENT)
Dept: GASTROENTEROLOGY | Facility: CLINIC | Age: 47
End: 2022-11-04
Payer: COMMERCIAL

## 2022-11-04 NOTE — TELEPHONE ENCOUNTER
Returned call to the patient, patient was requesting to set up a colonoscopy, patient declined offered date and requested that her message be sent to the [place] location and states that if she does not hear back from them, she may call us back.

## 2022-11-04 NOTE — TELEPHONE ENCOUNTER
----- Message from Saumya Sosa sent at 11/4/2022  4:19 PM CDT -----  Patient Returning Call    Who Called:PT   Who Left Message for Patient:NEERAJ   Does the patient know what this is regarding?:SCHEDULING APPOINTMENT  Best Call Back Number:[phone]

## 2022-11-04 NOTE — TELEPHONE ENCOUNTER
----- Message from Antonette Tucker sent at 11/4/2022  9:23 AM CDT -----  Contact: [phone]  Type: Needs Medical Advice  Who Called:  Pt     Best Call Back Number: [phone]    Additional Information: Pt is calling to schedule colonoscopy. Pls call back and advise

## 2022-11-04 NOTE — TELEPHONE ENCOUNTER
Attempted to return call to the patient, phone rand and a lady answered then the phone went silent.

## 2022-11-07 ENCOUNTER — TELEPHONE (OUTPATIENT)
Dept: GASTROENTEROLOGY | Facility: CLINIC | Age: 47
End: 2022-11-07
Payer: COMMERCIAL

## 2022-11-07 NOTE — TELEPHONE ENCOUNTER
----- Message from Rhiannon Xiong LPN sent at 11/4/2022  4:38 PM CDT -----  Patient requests to have colonoscopy in [place] prior to the end of the year.  Please assist..  MAIN Jurado  ----- Message -----  From: Saumya Sosa  Sent: 11/4/2022   4:20 PM CDT  To: Newton ROSENTHAL Jr. Staff    Patient Returning Call    Who Called:PT   Who Left Message for Patient:RHIANNON   Does the patient know what this is regarding?:SCHEDULING APPOINTMENT  Best Call Back Number:[phone]

## 2022-11-07 NOTE — TELEPHONE ENCOUNTER
Colonoscopy 12/30 at 130pm arrive for 1230pm instructions reviewed and patient states understanding.  Copy to portal

## 2022-12-21 ENCOUNTER — HOSPITAL ENCOUNTER (OUTPATIENT)
Dept: RADIOLOGY | Facility: HOSPITAL | Age: 47
Discharge: HOME | End: 2022-12-21
Attending: ORTHOPAEDIC SURGERY
Payer: COMMERCIAL

## 2022-12-21 ENCOUNTER — OFFICE VISIT (OUTPATIENT)
Dept: ORTHOPEDICS | Facility: CLINIC | Age: 47
End: 2022-12-21
Payer: COMMERCIAL

## 2022-12-21 VITALS — BODY MASS INDEX: 22.2 KG/M2 | WEIGHT: 130 LBS | HEIGHT: 64 IN

## 2022-12-21 DIAGNOSIS — M79.645 PAIN OF FINGER OF LEFT HAND: Primary | ICD-10-CM

## 2022-12-21 DIAGNOSIS — M79.645 PAIN OF FINGER OF LEFT HAND: ICD-10-CM

## 2022-12-21 PROCEDURE — 99999 PR PBB SHADOW E&M-EST. PATIENT-LVL III: CPT | Mod: PBBFAC,,, | Performed by: ORTHOPAEDIC SURGERY

## 2022-12-21 PROCEDURE — 99213 OFFICE O/P EST LOW 20 MIN: CPT | Mod: S$GLB,,, | Performed by: ORTHOPAEDIC SURGERY

## 2022-12-21 PROCEDURE — 99213 PR OFFICE/OUTPT VISIT, EST, LEVL III, 20-29 MIN: ICD-10-PCS | Mod: S$GLB,,, | Performed by: ORTHOPAEDIC SURGERY

## 2022-12-21 PROCEDURE — 3008F PR BODY MASS INDEX (BMI) DOCUMENTED: ICD-10-PCS | Mod: CPTII,S$GLB,, | Performed by: ORTHOPAEDIC SURGERY

## 2022-12-21 PROCEDURE — 73140 XR FINGER 2 OR MORE VIEWS LEFT: ICD-10-PCS | Mod: 26,LT,, | Performed by: RADIOLOGY

## 2022-12-21 PROCEDURE — 1159F MED LIST DOCD IN RCRD: CPT | Mod: CPTII,S$GLB,, | Performed by: ORTHOPAEDIC SURGERY

## 2022-12-21 PROCEDURE — 73140 X-RAY EXAM OF FINGER(S): CPT | Mod: 26,LT,, | Performed by: RADIOLOGY

## 2022-12-21 PROCEDURE — 73140 X-RAY EXAM OF FINGER(S): CPT | Mod: TC,PO,LT

## 2022-12-21 PROCEDURE — 99999 PR PBB SHADOW E&M-EST. PATIENT-LVL III: ICD-10-PCS | Mod: PBBFAC,,, | Performed by: ORTHOPAEDIC SURGERY

## 2022-12-21 PROCEDURE — 1159F PR MEDICATION LIST DOCUMENTED IN MEDICAL RECORD: ICD-10-PCS | Mod: CPTII,S$GLB,, | Performed by: ORTHOPAEDIC SURGERY

## 2022-12-21 PROCEDURE — 3008F BODY MASS INDEX DOCD: CPT | Mod: CPTII,S$GLB,, | Performed by: ORTHOPAEDIC SURGERY

## 2022-12-21 RX ORDER — MELOXICAM 15 MG/1
15 TABLET ORAL DAILY
Qty: 30 TABLET | Refills: 0 | Status: SHIPPED | OUTPATIENT
Start: 2022-12-21 | End: 2023-02-28

## 2022-12-21 NOTE — PROGRESS NOTES
Ms Soni returns to clinic today.  She has a new complaint of left middle finger pain.  She states that she is noticed some nodules develop over the tip of the finger.  She states that she does have pain with attempts at flexion and extension.  She did not have any injury.  She has not had any specific treatment.  She has no other complaints.      Physical exam: Examination left hand middle finger reveals that there are nodules overlying the distal interphalangeal joint.  This is consistent with arthritis and possibly mucoid cyst.  There were no other skin changes or deformities noted.  Palpation about the finger does produce mild tenderness over the A2 pulley region.  She has no A1 tenderness.  She does have tenderness over the distal interphalangeal joint.  She does have sensation intact over the tip of the finger and capillary refill is less than 2 seconds     Radiology:  X-rays of the left middle finger taken in clinic today.  The films reviewed by me.  There are very mild degenerative changes noted.  There is no other pathology.      Assessment: Left middle finger mucoid cyst possible early triggering     Plan:    1. I will start her on Mobic 15 mg per day     2. She will follow up with me on a p.r.n. basis.  If the Mobic is not sufficient and may consider steroid injections

## 2022-12-30 ENCOUNTER — HOSPITAL ENCOUNTER (OUTPATIENT)
Facility: HOSPITAL | Age: 47
Discharge: HOME | End: 2022-12-30
Attending: INTERNAL MEDICINE | Admitting: INTERNAL MEDICINE
Payer: COMMERCIAL

## 2022-12-30 ENCOUNTER — ANESTHESIA (OUTPATIENT)
Dept: ENDOSCOPY | Facility: HOSPITAL | Age: 47
End: 2022-12-30
Payer: COMMERCIAL

## 2022-12-30 ENCOUNTER — ANESTHESIA EVENT (OUTPATIENT)
Dept: ENDOSCOPY | Facility: HOSPITAL | Age: 47
End: 2022-12-30
Payer: COMMERCIAL

## 2022-12-30 VITALS
DIASTOLIC BLOOD PRESSURE: 59 MMHG | RESPIRATION RATE: 17 BRPM | SYSTOLIC BLOOD PRESSURE: 102 MMHG | OXYGEN SATURATION: 100 % | HEART RATE: 58 BPM | TEMPERATURE: 98 F

## 2022-12-30 DIAGNOSIS — Z12.11 COLON CANCER SCREENING: ICD-10-CM

## 2022-12-30 PROCEDURE — 37000008 HC ANESTHESIA 1ST 15 MINUTES: Performed by: INTERNAL MEDICINE

## 2022-12-30 PROCEDURE — 37000009 HC ANESTHESIA EA ADD 15 MINS: Performed by: INTERNAL MEDICINE

## 2022-12-30 PROCEDURE — D9220A PRA ANESTHESIA: Mod: ANES,,, | Performed by: ANESTHESIOLOGY

## 2022-12-30 PROCEDURE — D9220A PRA ANESTHESIA: Mod: CRNA,,, | Performed by: NURSE ANESTHETIST, CERTIFIED REGISTERED

## 2022-12-30 PROCEDURE — D9220A PRA ANESTHESIA: ICD-10-PCS | Mod: ANES,,, | Performed by: ANESTHESIOLOGY

## 2022-12-30 PROCEDURE — 63600175 PHARM REV CODE 636 W HCPCS: Performed by: NURSE ANESTHETIST, CERTIFIED REGISTERED

## 2022-12-30 PROCEDURE — G0121 COLON CA SCRN NOT HI RSK IND: HCPCS | Mod: ,,, | Performed by: INTERNAL MEDICINE

## 2022-12-30 PROCEDURE — D9220A PRA ANESTHESIA: ICD-10-PCS | Mod: CRNA,,, | Performed by: NURSE ANESTHETIST, CERTIFIED REGISTERED

## 2022-12-30 PROCEDURE — G0121 COLON CA SCRN NOT HI RSK IND: HCPCS | Performed by: INTERNAL MEDICINE

## 2022-12-30 PROCEDURE — G0121 COLON CA SCRN NOT HI RSK IND: ICD-10-PCS | Mod: ,,, | Performed by: INTERNAL MEDICINE

## 2022-12-30 PROCEDURE — 25000003 PHARM REV CODE 250: Performed by: INTERNAL MEDICINE

## 2022-12-30 RX ORDER — PROPOFOL 10 MG/ML
VIAL (ML) INTRAVENOUS
Status: DISCONTINUED | OUTPATIENT
Start: 2022-12-30 | End: 2022-12-30

## 2022-12-30 RX ORDER — SODIUM CHLORIDE 9 MG/ML
INJECTION, SOLUTION INTRAVENOUS CONTINUOUS
Status: DISCONTINUED | OUTPATIENT
Start: 2022-12-30 | End: 2022-12-30 | Stop reason: HOSPADM

## 2022-12-30 RX ADMIN — PROPOFOL 50 MG: 10 INJECTION, EMULSION INTRAVENOUS at 10:12

## 2022-12-30 RX ADMIN — PROPOFOL 50 MG: 10 INJECTION, EMULSION INTRAVENOUS at 09:12

## 2022-12-30 RX ADMIN — SODIUM CHLORIDE: 9 INJECTION, SOLUTION INTRAVENOUS at 08:12

## 2022-12-30 RX ADMIN — PROPOFOL 150 MG: 10 INJECTION, EMULSION INTRAVENOUS at 09:12

## 2022-12-30 NOTE — PLAN OF CARE
Vss, pk po fluids, denies pain, ambulates easily. IV removed, catheter intact. Discharge instructions provided and states understanding. States ready to go home.  Discharged from facility with family per wheelchair.

## 2022-12-30 NOTE — H&P
[facility] Note    CC: Screening colonoscopy     HPI 47 y.o. female presents for initial routine screening colonoscopy    Past Medical History:   Diagnosis Date    Abnormal Pap smear of cervix 2004    cryo    Anal fissure     Hypothyroid 6/13/2013    Localized adiposity     trunk    Rectal bleeding     TBI (traumatic brain injury) 2012    concussion, CT done.      Vertigo     Water retention     takes diuretic       Allergies and Medications reviewed     Review of Systems  General ROS: negative for - chills, fever or weight loss  Cardiovascular ROS: no chest pain or dyspnea on exertion  Gastrointestinal ROS: no blood in stool    Physical Examination  /74 (BP Location: Left arm, Patient Position: Lying)   Pulse 72   Temp 98.2 °F (36.8 °C) (Skin)   Resp 16   LMP 02/02/2021   SpO2 100%   Breastfeeding No   General appearance: alert, cooperative, no distress  HENT: Normocephalic, atraumatic, neck symmetrical, no nasal discharge, sclera anicteric   Lungs: clear to auscultation bilaterally, symmetric chest wall expansion bilaterally  Heart: regular rate and rhythm without rub; no displacement of the PMI   Abdomen: soft  Extremities: extremities symmetric; no clubbing, cyanosis, or edema      Assessment:   47 y.o. female presents for colonoscopy     Plan:  -Proceed to colonoscopy     Faith Valerio MD  [facility]  [address], [address]  [place] [address]  Office: [phone]  Fax: [phone]

## 2022-12-30 NOTE — TRANSFER OF CARE
Anesthesia Transfer of Care Note    Patient: Brenna Soni    Procedure(s) Performed: Procedure(s) (LRB):  COLONOSCOPY (N/A)    Patient location: GI    Anesthesia Type: general    Transport from OR: Transported from OR on room air with adequate spontaneous ventilation    Post pain: adequate analgesia    Post assessment: no apparent anesthetic complications    Post vital signs: stable    Level of consciousness: awake    Nausea/Vomiting: no nausea/vomiting    Complications: none    Transfer of care protocol was followed      Last vitals:   Visit Vitals  /74 (BP Location: Left arm, Patient Position: Lying)   Pulse 72   Temp 36.8 °C (98.2 °F) (Skin)   Resp 16   LMP 02/02/2021   SpO2 100%   Breastfeeding No

## 2022-12-30 NOTE — ANESTHESIA PREPROCEDURE EVALUATION
12/30/2022  Brenna Soni is a 47 y.o., female.      Pre-op Assessment    I have reviewed the Patient Summary Reports.     I have reviewed the Nursing Notes. I have reviewed the NPO Status.   I have reviewed the Medications.     Review of Systems  Anesthesia Hx:  No problems with previous Anesthesia Denies Hx of Anesthetic complications    Social:  Non-Smoker    Cardiovascular:   Denies Hypertension.  Denies MI.  Denies CAD.    Denies CABG/stent.   Denies Angina.    Pulmonary:   Denies COPD.  Denies Asthma.  Denies Recent URI.    Renal/:   Denies Chronic Renal Disease.     Hepatic/GI:   Denies GERD. Denies Liver Disease.    Musculoskeletal:   Arthritis     Neurological:   Denies TIA. Denies CVA. Neuromuscular Disease,  Denies Seizures.    Endocrine:   Denies Diabetes. Denies Hypothyroidism.    Psych:   Denies Psychiatric History.          Physical Exam  General: Well nourished, Cooperative, Alert and Oriented    Airway:  Mallampati: II / II  Mouth Opening: Normal  TM Distance: 4 - 6 cm  Tongue: Normal    Dental:  Intact    Chest/Lungs:  Clear to auscultation, Normal Respiratory Rate    Heart:  Rate: Normal  Rhythm: Regular Rhythm  Sounds: Normal        Anesthesia Plan  Type of Anesthesia, risks & benefits discussed:    Anesthesia Type: Gen Natural Airway  Intra-op Monitoring Plan: Standard ASA Monitors  Induction:  IV  Informed Consent: Informed consent signed with the Patient and all parties understand the risks and agree with anesthesia plan.  All questions answered.   ASA Score: 3    Ready For Surgery From Anesthesia Perspective.     .

## 2022-12-30 NOTE — ANESTHESIA POSTPROCEDURE EVALUATION
Anesthesia Post Evaluation    Patient: Brenna Soni    Procedure(s) Performed: Procedure(s) (LRB):  COLONOSCOPY (N/A)    Final Anesthesia Type: general      Patient location during evaluation: PACU  Patient participation: Yes- Able to Participate  Level of consciousness: awake and alert and oriented  Post-procedure vital signs: reviewed and stable  Pain management: adequate  Airway patency: patent    PONV status at discharge: No PONV  Anesthetic complications: no      Cardiovascular status: blood pressure returned to baseline  Respiratory status: unassisted, spontaneous ventilation and room air  Hydration status: euvolemic  Follow-up not needed.          Vitals Value Taken Time   /59 12/30/22 1045   Temp  12/30/22 1105   Pulse 58 12/30/22 1045   Resp 17 12/30/22 1028   SpO2 100 % 12/30/22 1045         Event Time   Out of Recovery 11:03:25         Pain/Michoacano Score: [place] Score: 10 (12/30/2022 10:38 AM)

## 2022-12-30 NOTE — PROVATION PATIENT INSTRUCTIONS
Discharge Summary/Instructions after an Endoscopic Procedure  Patient Name: Brenna Soni  Patient MRN: [id number]  Patient YOB: 1975 Friday, December 30, 2022  Faith Valerio MD  Dear patient,  As a result of recent federal legislation (The Federal Cures Act), you may   receive lab or pathology results from your procedure in your [facility]   account before your physician is able to contact you. Your physician or   their representative will relay the results to you with their   recommendations at their soonest availability.  Thank you,  RESTRICTIONS:  During your procedure today, you received medications for sedation.  These   medications may affect your judgment, balance and coordination.  Therefore,   for 24 hours, you have the following restrictions:   - DO NOT drive a car, operate machinery, make legal/financial decisions,   sign important papers or drink alcohol.    ACTIVITY:  Today: no heavy lifting, straining or running due to procedural   sedation/anesthesia.  The following day: return to full activity including work.  DIET:  Eat and drink normally unless instructed otherwise.     TREATMENT FOR COMMON SIDE EFFECTS:  - Mild abdominal pain, nausea, belching, bloating or excessive gas:  rest,   eat lightly and use a heating pad.  - Sore Throat: treat with throat lozenges and/or gargle with warm salt   water.  - Because air was used during the procedure, expelling large amounts of air   from your rectum or belching is normal.  - If a bowel prep was taken, you may not have a bowel movement for 1-3 days.    This is normal.  SYMPTOMS TO WATCH FOR AND REPORT TO YOUR PHYSICIAN:  1. Abdominal pain or bloating, other than gas cramps.  2. Chest pain.  3. Back pain.  4. Signs of infection such as: chills or fever occurring within 24 hours   after the procedure.  5. Rectal bleeding, which would show as bright red, maroon, or black stools.   (A tablespoon of blood from the rectum is not  serious, especially if   hemorrhoids are present.)  6. Vomiting.  7. Weakness or dizziness.  GO DIRECTLY TO THE NEAREST EMERGENCY ROOM IF YOU HAVE ANY OF THE FOLLOWING:      Difficulty breathing              Chills and/or fever over 101 F   Persistent vomiting and/or vomiting blood   Severe abdominal pain   Severe chest pain   Black, tarry stools   Bleeding- more than one tablespoon   Any other symptom or condition that you feel may need urgent attention  Your doctor recommends these additional instructions:  If any biopsies were taken, your doctors clinic will contact you in 1 to 2   weeks with any results.  - Discharge patient to home (with escort).   - Patient has a contact number available for emergencies.  The signs and   symptoms of potential delayed complications were discussed with the   patient.  Return to normal activities tomorrow.  Written discharge   instructions were provided to the patient.   - Resume previous diet.   - Continue present medications.   - Repeat colonoscopy in 10 years for screening purposes.   - Return to my office PRN.  For questions, problems or results please call your physician - Faith Valerio MD at Work:  [phone].  [facility] PHONE NUMBER: [phone]  IF A COMPLICATION OR EMERGENCY SITUATION ARISES AND YOU ARE UNABLE TO REACH   YOUR PHYSICIAN - GO DIRECTLY TO THE EMERGENCY ROOM.  Faith Valerio MD  12/30/2022 10:15:08 AM  This report has been verified and signed electronically.  Dear patient,  As a result of recent federal legislation (The Federal Cures Act), you may   receive lab or pathology results from your procedure in your [facility]   account before your physician is able to contact you. Your physician or   their representative will relay the results to you with their   recommendations at their soonest availability.  Thank you,  PROVATION

## 2023-02-28 ENCOUNTER — OFFICE VISIT (OUTPATIENT)
Dept: OBSTETRICS AND GYNECOLOGY | Facility: CLINIC | Age: 48
End: 2023-02-28
Payer: COMMERCIAL

## 2023-02-28 VITALS
WEIGHT: 131.63 LBS | SYSTOLIC BLOOD PRESSURE: 116 MMHG | HEIGHT: 64 IN | BODY MASS INDEX: 22.47 KG/M2 | DIASTOLIC BLOOD PRESSURE: 66 MMHG

## 2023-02-28 DIAGNOSIS — N92.1 METRORRHAGIA: ICD-10-CM

## 2023-02-28 DIAGNOSIS — Z98.890 S/P ENDOMETRIAL ABLATION: ICD-10-CM

## 2023-02-28 DIAGNOSIS — E03.9 ACQUIRED HYPOTHYROIDISM: ICD-10-CM

## 2023-02-28 DIAGNOSIS — R23.2 HOT FLASHES: Primary | ICD-10-CM

## 2023-02-28 DIAGNOSIS — N95.1 PERIMENOPAUSE: ICD-10-CM

## 2023-02-28 DIAGNOSIS — Z79.890 HORMONE REPLACEMENT THERAPY (HRT): ICD-10-CM

## 2023-02-28 PROCEDURE — 99213 OFFICE O/P EST LOW 20 MIN: CPT | Mod: 25,S$GLB,, | Performed by: OBSTETRICS & GYNECOLOGY

## 2023-02-28 PROCEDURE — 3078F PR MOST RECENT DIASTOLIC BLOOD PRESSURE < 80 MM HG: ICD-10-PCS | Mod: CPTII,S$GLB,, | Performed by: OBSTETRICS & GYNECOLOGY

## 2023-02-28 PROCEDURE — 3008F PR BODY MASS INDEX (BMI) DOCUMENTED: ICD-10-PCS | Mod: CPTII,S$GLB,, | Performed by: OBSTETRICS & GYNECOLOGY

## 2023-02-28 PROCEDURE — 3078F DIAST BP <80 MM HG: CPT | Mod: CPTII,S$GLB,, | Performed by: OBSTETRICS & GYNECOLOGY

## 2023-02-28 PROCEDURE — 99213 PR OFFICE/OUTPT VISIT, EST, LEVL III, 20-29 MIN: ICD-10-PCS | Mod: 25,S$GLB,, | Performed by: OBSTETRICS & GYNECOLOGY

## 2023-02-28 PROCEDURE — 99999 PR PBB SHADOW E&M-EST. PATIENT-LVL III: CPT | Mod: PBBFAC,,, | Performed by: OBSTETRICS & GYNECOLOGY

## 2023-02-28 PROCEDURE — 96372 PR INJECTION,THERAP/PROPH/DIAG2ST, IM OR SUBCUT: ICD-10-PCS | Mod: S$GLB,,, | Performed by: OBSTETRICS & GYNECOLOGY

## 2023-02-28 PROCEDURE — 1159F PR MEDICATION LIST DOCUMENTED IN MEDICAL RECORD: ICD-10-PCS | Mod: CPTII,S$GLB,, | Performed by: OBSTETRICS & GYNECOLOGY

## 2023-02-28 PROCEDURE — 3074F PR MOST RECENT SYSTOLIC BLOOD PRESSURE < 130 MM HG: ICD-10-PCS | Mod: CPTII,S$GLB,, | Performed by: OBSTETRICS & GYNECOLOGY

## 2023-02-28 PROCEDURE — 3008F BODY MASS INDEX DOCD: CPT | Mod: CPTII,S$GLB,, | Performed by: OBSTETRICS & GYNECOLOGY

## 2023-02-28 PROCEDURE — 99999 PR PBB SHADOW E&M-EST. PATIENT-LVL III: ICD-10-PCS | Mod: PBBFAC,,, | Performed by: OBSTETRICS & GYNECOLOGY

## 2023-02-28 PROCEDURE — 96372 THER/PROPH/DIAG INJ SC/IM: CPT | Mod: S$GLB,,, | Performed by: OBSTETRICS & GYNECOLOGY

## 2023-02-28 PROCEDURE — 1159F MED LIST DOCD IN RCRD: CPT | Mod: CPTII,S$GLB,, | Performed by: OBSTETRICS & GYNECOLOGY

## 2023-02-28 PROCEDURE — 3074F SYST BP LT 130 MM HG: CPT | Mod: CPTII,S$GLB,, | Performed by: OBSTETRICS & GYNECOLOGY

## 2023-02-28 RX ORDER — ESTRADIOL VALERATE 20 MG/ML
50 INJECTION INTRAMUSCULAR
Status: DISCONTINUED | OUTPATIENT
Start: 2023-03-01 | End: 2023-05-11

## 2023-02-28 RX ORDER — PROGESTERONE 200 MG/1
200 CAPSULE ORAL NIGHTLY
Qty: 30 CAPSULE | Refills: 11 | Status: SHIPPED | OUTPATIENT
Start: 2023-02-28 | End: 2024-02-28 | Stop reason: SDUPTHER

## 2023-02-28 RX ADMIN — ESTRADIOL VALERATE 50 MG: 20 INJECTION INTRAMUSCULAR at 04:02

## 2023-02-28 NOTE — PROGRESS NOTES
Chief Complaint   Patient presents with    wants hormonal labs    finds hot flashes have returned since taking the Divigel       History of Present Illness: Brenna Soni is a 47 y.o. female that presents today 2023 for   Chief Complaint   Patient presents with    wants hormonal labs    finds hot flashes have returned since taking the Divigel           Past Medical History:   Diagnosis Date    Abnormal Pap smear of cervix     cryo    Anal fissure     Hypothyroid 2013    Localized adiposity     trunk    Rectal bleeding     TBI (traumatic brain injury)     concussion, CT done.      Vertigo     Water retention     takes diuretic       Past Surgical History:   Procedure Laterality Date    AUGMENTATION OF BREAST Bilateral     Lift also    BREAST SURGERY      augmentation     SECTION      COLONOSCOPY N/A 2022    Procedure: COLONOSCOPY;  Surgeon: Faith Valerio MD;  Location: [facility];  Service: Endoscopy;  Laterality: N/A;    COSMETIC SURGERY  2015    tummy tuck    gastric sleeve      HYSTEROSCOPY WITH DILATION AND CURETTAGE OF UTERUS N/A 2022    Procedure: HYSTEROSCOPY, WITH DILATION AND CURETTAGE OF UTERUS;  Surgeon: Nina Edwards MD;  Location: [facility];  Service: OB/GYN;  Laterality: N/A;    laparoscopic abdominal      X2 for endometriosis    Tummy tuck      uterine ablation         Current Outpatient Medications   Medication Sig Dispense Refill    estradioL 0.75 mg/0.75 gram (0.1%) GlPk Place 0.75 mg onto the skin once daily. 30 packet 11    ibuprofen (ADVIL,MOTRIN) 400 MG tablet Take 400 mg by mouth every 6 (six) hours as needed for Other.      ibuprofen (ADVIL,MOTRIN) 800 MG tablet Take 1 tablet (800 mg total) by mouth every 8 (eight) hours as needed for Pain. 20 tablet 0    levothyroxine (SYNTHROID) 100 MCG tablet TAKE 1 TABLET(100 MCG) BY MOUTH EVERY DAY 90 tablet 3    semaglutide (OZEMPIC) 0.25 mg or 0.5 mg(2 mg/1.5 mL) pen injector Inject 0.25 mg  "into the skin every Monday.      progesterone (PROMETRIUM) 200 MG capsule Take 1 capsule (200 mg total) by mouth nightly. 30 capsule 11     Current Facility-Administered Medications   Medication Dose Route Frequency Provider Last Rate Last Admin    [START ON 3/1/2023] estradiol valerate (DELESTROGEN) injection 20 mg/mL  50 mg Intramuscular Q30 Days Nina Edwards MD   50 mg at 23 1651       Review of patient's allergies indicates:  No Known Allergies    Family History   Adopted: Yes       Social History     Tobacco Use    Smoking status: Never    Smokeless tobacco: Never   Substance Use Topics    Alcohol use: Yes     Alcohol/week: 3.0 standard drinks     Types: 3 Glasses of wine per week     Comment: 2-3 glasses of wine/week    Drug use: No       OB History    Para Term  AB Living   1 1 1         SAB IAB Ectopic Multiple Live Births                  # Outcome Date GA Lbr Dmitriy/2nd Weight Sex Delivery Anes PTL Lv   1 Term                  /66   Ht 5' 4" (1.626 m)   Wt 59.7 kg (131 lb 9.8 oz)   LMP 2021       ASSESSMENT/PLAN:  Hot flashes  -     progesterone (PROMETRIUM) 200 MG capsule; Take 1 capsule (200 mg total) by mouth nightly.  Dispense: 30 capsule; Refill: 11  -     ESTRADIOL; Future; Expected date: 2023  -     Progesterone; Future; Expected date: 2023  -     Testosterone, free; Future; Expected date: 2023  -     estradiol valerate (DELESTROGEN) injection 20 mg/mL    Perimenopause  -     progesterone (PROMETRIUM) 200 MG capsule; Take 1 capsule (200 mg total) by mouth nightly.  Dispense: 30 capsule; Refill: 11  -     estradiol valerate (DELESTROGEN) injection 20 mg/mL    Hormone replacement therapy (HRT)  -     progesterone (PROMETRIUM) 200 MG capsule; Take 1 capsule (200 mg total) by mouth nightly.  Dispense: 30 capsule; Refill: 11  -     estradiol valerate (DELESTROGEN) injection 20 mg/mL    Metrorrhagia  -     progesterone (PROMETRIUM) 200 MG capsule; " Take 1 capsule (200 mg total) by mouth nightly.  Dispense: 30 capsule; Refill: 11    S/P endometrial ablation  -     progesterone (PROMETRIUM) 200 MG capsule; Take 1 capsule (200 mg total) by mouth nightly.  Dispense: 30 capsule; Refill: 11    Acquired hypothyroidism  -     TSH; Future; Expected date: 02/28/2023      20 minutes spent today preparing reviewing previous external notes, reviewing previous results, performing medical examination, orders tests or medications, counseling and documenting.

## 2023-03-03 ENCOUNTER — LAB VISIT (OUTPATIENT)
Dept: LAB | Facility: HOSPITAL | Age: 48
End: 2023-03-03
Attending: OBSTETRICS & GYNECOLOGY
Payer: COMMERCIAL

## 2023-03-03 DIAGNOSIS — E03.9 ACQUIRED HYPOTHYROIDISM: ICD-10-CM

## 2023-03-03 DIAGNOSIS — R23.2 HOT FLASHES: ICD-10-CM

## 2023-03-03 LAB
PROGEST SERPL-MCNC: 7.4 NG/ML
TSH SERPL DL<=0.005 MIU/L-ACNC: 2.66 UIU/ML (ref 0.4–4)

## 2023-03-03 PROCEDURE — 84402 ASSAY OF FREE TESTOSTERONE: CPT | Performed by: OBSTETRICS & GYNECOLOGY

## 2023-03-03 PROCEDURE — 36415 COLL VENOUS BLD VENIPUNCTURE: CPT | Mod: PO | Performed by: OBSTETRICS & GYNECOLOGY

## 2023-03-03 PROCEDURE — 84443 ASSAY THYROID STIM HORMONE: CPT | Performed by: OBSTETRICS & GYNECOLOGY

## 2023-03-03 PROCEDURE — 84144 ASSAY OF PROGESTERONE: CPT | Performed by: OBSTETRICS & GYNECOLOGY

## 2023-03-03 PROCEDURE — 82670 ASSAY OF TOTAL ESTRADIOL: CPT | Performed by: OBSTETRICS & GYNECOLOGY

## 2023-03-04 LAB — ESTRADIOL SERPL-MCNC: 4335 PG/ML

## 2023-03-06 ENCOUNTER — PATIENT MESSAGE (OUTPATIENT)
Dept: OBSTETRICS AND GYNECOLOGY | Facility: CLINIC | Age: 48
End: 2023-03-06
Payer: COMMERCIAL

## 2023-03-06 ENCOUNTER — TELEPHONE (OUTPATIENT)
Dept: OBSTETRICS AND GYNECOLOGY | Facility: CLINIC | Age: 48
End: 2023-03-06
Payer: COMMERCIAL

## 2023-03-06 DIAGNOSIS — Z79.890 HORMONE REPLACEMENT THERAPY (HRT): Primary | ICD-10-CM

## 2023-03-06 LAB — TESTOST FREE SERPL-MCNC: <0.4 PG/ML

## 2023-03-06 NOTE — TELEPHONE ENCOUNTER
----- Message from Anyi Segovia sent at 3/6/2023  3:56 PM CST -----  Contact: pt  Type:  Patient Returning Call    Who Called:pt  Who Left Message for Patient:ree  Does the patient know what this is regarding?:Results  Would the patient rather a call back or a response via [facility]? call  Best Call Back Number:[phone]   Additional Information: Please advise thank you

## 2023-03-21 ENCOUNTER — TELEPHONE (OUTPATIENT)
Dept: OBSTETRICS AND GYNECOLOGY | Facility: CLINIC | Age: 48
End: 2023-03-21
Payer: COMMERCIAL

## 2023-03-21 NOTE — TELEPHONE ENCOUNTER
----- Message from Jerry Abel sent at 3/21/2023  8:24 AM CDT -----  Regarding: reschedule nurse visit  Type: Needs Medical Advice    Who Called:  Brenna Gray Call Back Number: [phone]    Additional Information: pt needs to reschedule upcoming nurse visit. Please call.

## 2023-03-27 ENCOUNTER — LAB VISIT (OUTPATIENT)
Dept: LAB | Facility: HOSPITAL | Age: 48
End: 2023-03-27
Attending: OBSTETRICS & GYNECOLOGY
Payer: COMMERCIAL

## 2023-03-27 DIAGNOSIS — Z79.890 HORMONE REPLACEMENT THERAPY (HRT): ICD-10-CM

## 2023-03-27 LAB — ESTRADIOL SERPL-MCNC: 117 PG/ML

## 2023-03-27 PROCEDURE — 36415 COLL VENOUS BLD VENIPUNCTURE: CPT | Mod: PO | Performed by: OBSTETRICS & GYNECOLOGY

## 2023-03-27 PROCEDURE — 82670 ASSAY OF TOTAL ESTRADIOL: CPT | Performed by: OBSTETRICS & GYNECOLOGY

## 2023-03-28 ENCOUNTER — PATIENT MESSAGE (OUTPATIENT)
Dept: OBSTETRICS AND GYNECOLOGY | Facility: CLINIC | Age: 48
End: 2023-03-28
Payer: COMMERCIAL

## 2023-03-28 DIAGNOSIS — Z79.890 HORMONE REPLACEMENT THERAPY (HRT): Primary | ICD-10-CM

## 2023-03-29 ENCOUNTER — TELEPHONE (OUTPATIENT)
Dept: OBSTETRICS AND GYNECOLOGY | Facility: CLINIC | Age: 48
End: 2023-03-29
Payer: COMMERCIAL

## 2023-03-29 NOTE — TELEPHONE ENCOUNTER
Push injection out 1 week    Lower dose to 25 mg    Estradiol lab order in for 2 weeks after injection

## 2023-04-06 ENCOUNTER — CLINICAL SUPPORT (OUTPATIENT)
Dept: OBSTETRICS AND GYNECOLOGY | Facility: CLINIC | Age: 48
End: 2023-04-06
Payer: COMMERCIAL

## 2023-04-06 PROCEDURE — 96372 PR INJECTION,THERAP/PROPH/DIAG2ST, IM OR SUBCUT: ICD-10-PCS | Mod: S$GLB,,, | Performed by: OBSTETRICS & GYNECOLOGY

## 2023-04-06 PROCEDURE — 99999 PR PBB SHADOW E&M-EST. PATIENT-LVL I: CPT | Mod: PBBFAC,,,

## 2023-04-06 PROCEDURE — 96372 THER/PROPH/DIAG INJ SC/IM: CPT | Mod: S$GLB,,, | Performed by: OBSTETRICS & GYNECOLOGY

## 2023-04-06 PROCEDURE — 99999 PR PBB SHADOW E&M-EST. PATIENT-LVL I: ICD-10-PCS | Mod: PBBFAC,,,

## 2023-04-06 RX ADMIN — ESTRADIOL VALERATE 25 MG: 20 INJECTION INTRAMUSCULAR at 05:04

## 2023-04-06 NOTE — PROGRESS NOTES
Patient came in for delestrogen estradiol valerate, 25mg, right ventrogluteral. Tolerated well. No adverse reactions noted

## 2023-04-20 ENCOUNTER — LAB VISIT (OUTPATIENT)
Dept: LAB | Facility: HOSPITAL | Age: 48
End: 2023-04-20
Attending: OBSTETRICS & GYNECOLOGY
Payer: COMMERCIAL

## 2023-04-20 DIAGNOSIS — Z79.890 HORMONE REPLACEMENT THERAPY (HRT): ICD-10-CM

## 2023-04-20 LAB — ESTRADIOL SERPL-MCNC: 309 PG/ML

## 2023-04-20 PROCEDURE — 36415 COLL VENOUS BLD VENIPUNCTURE: CPT | Mod: PO | Performed by: OBSTETRICS & GYNECOLOGY

## 2023-04-20 PROCEDURE — 82670 ASSAY OF TOTAL ESTRADIOL: CPT | Performed by: OBSTETRICS & GYNECOLOGY

## 2023-04-25 ENCOUNTER — TELEPHONE (OUTPATIENT)
Dept: OBSTETRICS AND GYNECOLOGY | Facility: CLINIC | Age: 48
End: 2023-04-25
Payer: COMMERCIAL

## 2023-04-25 ENCOUNTER — PATIENT MESSAGE (OUTPATIENT)
Dept: OBSTETRICS AND GYNECOLOGY | Facility: CLINIC | Age: 48
End: 2023-04-25
Payer: COMMERCIAL

## 2023-04-25 NOTE — TELEPHONE ENCOUNTER
----- Message from Kathi Edwards sent at 4/25/2023 11:13 AM CDT -----  Contact: patient  Type:  Appointment Request    Name of Caller: patient     When is the first available appointment? N/a     Symptoms:nurse visit- injection     Would the patient rather a call back or a response via [facility]? Call     Best Call Back Number: [phone] (home) [phone] (work)     Additional Information:

## 2023-05-11 ENCOUNTER — CLINICAL SUPPORT (OUTPATIENT)
Dept: OBSTETRICS AND GYNECOLOGY | Facility: CLINIC | Age: 48
End: 2023-05-11
Payer: COMMERCIAL

## 2023-05-11 DIAGNOSIS — Z79.890 HORMONE REPLACEMENT THERAPY (HRT): Primary | ICD-10-CM

## 2023-05-11 PROCEDURE — 96372 PR INJECTION,THERAP/PROPH/DIAG2ST, IM OR SUBCUT: ICD-10-PCS | Mod: S$GLB,,, | Performed by: OBSTETRICS & GYNECOLOGY

## 2023-05-11 PROCEDURE — 99999 PR PBB SHADOW E&M-EST. PATIENT-LVL I: CPT | Mod: PBBFAC,,,

## 2023-05-11 PROCEDURE — 96372 THER/PROPH/DIAG INJ SC/IM: CPT | Mod: S$GLB,,, | Performed by: OBSTETRICS & GYNECOLOGY

## 2023-05-11 PROCEDURE — 99999 PR PBB SHADOW E&M-EST. PATIENT-LVL I: ICD-10-PCS | Mod: PBBFAC,,,

## 2023-05-11 RX ORDER — ESTRADIOL VALERATE 20 MG/ML
25 INJECTION INTRAMUSCULAR
Status: DISCONTINUED | OUTPATIENT
Start: 2023-05-11 | End: 2023-07-13

## 2023-05-11 RX ADMIN — ESTRADIOL VALERATE 25 MG: 20 INJECTION INTRAMUSCULAR at 01:05

## 2023-06-15 ENCOUNTER — CLINICAL SUPPORT (OUTPATIENT)
Dept: OBSTETRICS AND GYNECOLOGY | Facility: CLINIC | Age: 48
End: 2023-06-15
Payer: COMMERCIAL

## 2023-06-15 PROCEDURE — 96372 THER/PROPH/DIAG INJ SC/IM: CPT | Mod: S$GLB,,, | Performed by: OBSTETRICS & GYNECOLOGY

## 2023-06-15 PROCEDURE — 96372 PR INJECTION,THERAP/PROPH/DIAG2ST, IM OR SUBCUT: ICD-10-PCS | Mod: S$GLB,,, | Performed by: OBSTETRICS & GYNECOLOGY

## 2023-06-15 RX ADMIN — ESTRADIOL VALERATE 25 MG: 20 INJECTION INTRAMUSCULAR at 10:06

## 2023-06-26 ENCOUNTER — LAB VISIT (OUTPATIENT)
Dept: LAB | Facility: HOSPITAL | Age: 48
End: 2023-06-26
Attending: STUDENT IN AN ORGANIZED HEALTH CARE EDUCATION/TRAINING PROGRAM
Payer: COMMERCIAL

## 2023-06-26 ENCOUNTER — OFFICE VISIT (OUTPATIENT)
Dept: FAMILY MEDICINE | Facility: CLINIC | Age: 48
End: 2023-06-26
Payer: COMMERCIAL

## 2023-06-26 VITALS
HEART RATE: 72 BPM | BODY MASS INDEX: 23.22 KG/M2 | WEIGHT: 136 LBS | SYSTOLIC BLOOD PRESSURE: 100 MMHG | OXYGEN SATURATION: 98 % | HEIGHT: 64 IN | DIASTOLIC BLOOD PRESSURE: 64 MMHG

## 2023-06-26 DIAGNOSIS — Z98.84 S/P GASTRIC BYPASS: ICD-10-CM

## 2023-06-26 DIAGNOSIS — Z00.00 HEALTHCARE MAINTENANCE: Primary | ICD-10-CM

## 2023-06-26 DIAGNOSIS — Z00.00 HEALTHCARE MAINTENANCE: ICD-10-CM

## 2023-06-26 DIAGNOSIS — E03.9 ACQUIRED HYPOTHYROIDISM: ICD-10-CM

## 2023-06-26 DIAGNOSIS — R53.82 CHRONIC FATIGUE: ICD-10-CM

## 2023-06-26 PROBLEM — Z98.890 S/P ENDOMETRIAL ABLATION: Status: RESOLVED | Noted: 2022-07-20 | Resolved: 2023-06-26

## 2023-06-26 PROBLEM — N95.0 POSTMENOPAUSAL BLEEDING: Status: RESOLVED | Noted: 2022-07-20 | Resolved: 2023-06-26

## 2023-06-26 LAB
ALBUMIN SERPL BCP-MCNC: 3.9 G/DL (ref 3.5–5.2)
ALP SERPL-CCNC: 44 U/L (ref 55–135)
ALT SERPL W/O P-5'-P-CCNC: 12 U/L (ref 10–44)
ANION GAP SERPL CALC-SCNC: 10 MMOL/L (ref 8–16)
AST SERPL-CCNC: 21 U/L (ref 10–40)
BILIRUB SERPL-MCNC: 0.5 MG/DL (ref 0.1–1)
BUN SERPL-MCNC: 9 MG/DL (ref 6–20)
CALCIUM SERPL-MCNC: 9.1 MG/DL (ref 8.7–10.5)
CHLORIDE SERPL-SCNC: 104 MMOL/L (ref 95–110)
CO2 SERPL-SCNC: 25 MMOL/L (ref 23–29)
CREAT SERPL-MCNC: 0.8 MG/DL (ref 0.5–1.4)
ERYTHROCYTE [DISTWIDTH] IN BLOOD BY AUTOMATED COUNT: 12.4 % (ref 11.5–14.5)
EST. GFR  (NO RACE VARIABLE): >60 ML/MIN/1.73 M^2
FERRITIN SERPL-MCNC: 59 NG/ML (ref 20–300)
GLUCOSE SERPL-MCNC: 79 MG/DL (ref 70–110)
HCT VFR BLD AUTO: 42.2 % (ref 37–48.5)
HCV AB SERPL QL IA: NORMAL
HGB BLD-MCNC: 14.1 G/DL (ref 12–16)
IRON SERPL-MCNC: 98 UG/DL (ref 30–160)
MCH RBC QN AUTO: 32.5 PG (ref 27–31)
MCHC RBC AUTO-ENTMCNC: 33.4 G/DL (ref 32–36)
MCV RBC AUTO: 97 FL (ref 82–98)
PLATELET # BLD AUTO: 220 K/UL (ref 150–450)
PMV BLD AUTO: 11.7 FL (ref 9.2–12.9)
POTASSIUM SERPL-SCNC: 3.9 MMOL/L (ref 3.5–5.1)
PROT SERPL-MCNC: 7.1 G/DL (ref 6–8.4)
PTH-INTACT SERPL-MCNC: 42.2 PG/ML (ref 9–77)
RBC # BLD AUTO: 4.34 M/UL (ref 4–5.4)
SATURATED IRON: 23 % (ref 20–50)
SODIUM SERPL-SCNC: 139 MMOL/L (ref 136–145)
TOTAL IRON BINDING CAPACITY: 426 UG/DL (ref 250–450)
TRANSFERRIN SERPL-MCNC: 288 MG/DL (ref 200–375)
TSH SERPL DL<=0.005 MIU/L-ACNC: 2.39 UIU/ML (ref 0.4–4)
VIT B12 SERPL-MCNC: 383 PG/ML (ref 210–950)
WBC # BLD AUTO: 4.99 K/UL (ref 3.9–12.7)

## 2023-06-26 PROCEDURE — 82607 VITAMIN B-12: CPT | Performed by: STUDENT IN AN ORGANIZED HEALTH CARE EDUCATION/TRAINING PROGRAM

## 2023-06-26 PROCEDURE — 99999 PR PBB SHADOW E&M-EST. PATIENT-LVL III: CPT | Mod: PBBFAC,,, | Performed by: STUDENT IN AN ORGANIZED HEALTH CARE EDUCATION/TRAINING PROGRAM

## 2023-06-26 PROCEDURE — 85027 COMPLETE CBC AUTOMATED: CPT | Performed by: STUDENT IN AN ORGANIZED HEALTH CARE EDUCATION/TRAINING PROGRAM

## 2023-06-26 PROCEDURE — 84466 ASSAY OF TRANSFERRIN: CPT | Performed by: STUDENT IN AN ORGANIZED HEALTH CARE EDUCATION/TRAINING PROGRAM

## 2023-06-26 PROCEDURE — 82728 ASSAY OF FERRITIN: CPT | Performed by: STUDENT IN AN ORGANIZED HEALTH CARE EDUCATION/TRAINING PROGRAM

## 2023-06-26 PROCEDURE — 86803 HEPATITIS C AB TEST: CPT | Performed by: STUDENT IN AN ORGANIZED HEALTH CARE EDUCATION/TRAINING PROGRAM

## 2023-06-26 PROCEDURE — 1159F PR MEDICATION LIST DOCUMENTED IN MEDICAL RECORD: ICD-10-PCS | Mod: CPTII,S$GLB,, | Performed by: STUDENT IN AN ORGANIZED HEALTH CARE EDUCATION/TRAINING PROGRAM

## 2023-06-26 PROCEDURE — 3078F PR MOST RECENT DIASTOLIC BLOOD PRESSURE < 80 MM HG: ICD-10-PCS | Mod: CPTII,S$GLB,, | Performed by: STUDENT IN AN ORGANIZED HEALTH CARE EDUCATION/TRAINING PROGRAM

## 2023-06-26 PROCEDURE — 82306 VITAMIN D 25 HYDROXY: CPT | Performed by: STUDENT IN AN ORGANIZED HEALTH CARE EDUCATION/TRAINING PROGRAM

## 2023-06-26 PROCEDURE — 84238 ASSAY NONENDOCRINE RECEPTOR: CPT | Performed by: STUDENT IN AN ORGANIZED HEALTH CARE EDUCATION/TRAINING PROGRAM

## 2023-06-26 PROCEDURE — 3074F PR MOST RECENT SYSTOLIC BLOOD PRESSURE < 130 MM HG: ICD-10-PCS | Mod: CPTII,S$GLB,, | Performed by: STUDENT IN AN ORGANIZED HEALTH CARE EDUCATION/TRAINING PROGRAM

## 2023-06-26 PROCEDURE — 83970 ASSAY OF PARATHORMONE: CPT | Performed by: STUDENT IN AN ORGANIZED HEALTH CARE EDUCATION/TRAINING PROGRAM

## 2023-06-26 PROCEDURE — 3008F BODY MASS INDEX DOCD: CPT | Mod: CPTII,S$GLB,, | Performed by: STUDENT IN AN ORGANIZED HEALTH CARE EDUCATION/TRAINING PROGRAM

## 2023-06-26 PROCEDURE — 99396 PREV VISIT EST AGE 40-64: CPT | Mod: S$GLB,,, | Performed by: STUDENT IN AN ORGANIZED HEALTH CARE EDUCATION/TRAINING PROGRAM

## 2023-06-26 PROCEDURE — 3008F PR BODY MASS INDEX (BMI) DOCUMENTED: ICD-10-PCS | Mod: CPTII,S$GLB,, | Performed by: STUDENT IN AN ORGANIZED HEALTH CARE EDUCATION/TRAINING PROGRAM

## 2023-06-26 PROCEDURE — 1159F MED LIST DOCD IN RCRD: CPT | Mod: CPTII,S$GLB,, | Performed by: STUDENT IN AN ORGANIZED HEALTH CARE EDUCATION/TRAINING PROGRAM

## 2023-06-26 PROCEDURE — 99396 PR PREVENTIVE VISIT,EST,40-64: ICD-10-PCS | Mod: S$GLB,,, | Performed by: STUDENT IN AN ORGANIZED HEALTH CARE EDUCATION/TRAINING PROGRAM

## 2023-06-26 PROCEDURE — 99999 PR PBB SHADOW E&M-EST. PATIENT-LVL III: ICD-10-PCS | Mod: PBBFAC,,, | Performed by: STUDENT IN AN ORGANIZED HEALTH CARE EDUCATION/TRAINING PROGRAM

## 2023-06-26 PROCEDURE — 84443 ASSAY THYROID STIM HORMONE: CPT | Performed by: STUDENT IN AN ORGANIZED HEALTH CARE EDUCATION/TRAINING PROGRAM

## 2023-06-26 PROCEDURE — 3078F DIAST BP <80 MM HG: CPT | Mod: CPTII,S$GLB,, | Performed by: STUDENT IN AN ORGANIZED HEALTH CARE EDUCATION/TRAINING PROGRAM

## 2023-06-26 PROCEDURE — 3074F SYST BP LT 130 MM HG: CPT | Mod: CPTII,S$GLB,, | Performed by: STUDENT IN AN ORGANIZED HEALTH CARE EDUCATION/TRAINING PROGRAM

## 2023-06-26 PROCEDURE — 84590 ASSAY OF VITAMIN A: CPT | Performed by: STUDENT IN AN ORGANIZED HEALTH CARE EDUCATION/TRAINING PROGRAM

## 2023-06-26 PROCEDURE — 36415 COLL VENOUS BLD VENIPUNCTURE: CPT | Mod: PO | Performed by: STUDENT IN AN ORGANIZED HEALTH CARE EDUCATION/TRAINING PROGRAM

## 2023-06-26 PROCEDURE — 80053 COMPREHEN METABOLIC PANEL: CPT | Performed by: STUDENT IN AN ORGANIZED HEALTH CARE EDUCATION/TRAINING PROGRAM

## 2023-06-26 NOTE — PATIENT INSTRUCTIONS
"For more information on insomnia and how to treat it, you can visit this web page provided by the American Academy of Sleep Medicine:  https://sleepeducation.org/sleep-disorders/insomnia/    To improve your sleep without medication, you can read "Say Good Night to Insomnia: The Six Week, Drug-Free Program Developed at [facility]" by Beni Thomas. This book's methods are based on cognitive behavioral therapy, the first line treatment for insomnia.  https://www.Rainbow/Say-Good-Night-Insomnia-Drug-Free/dp/0923635126    If you would like a smart phone estrella that simulates cognitive behavioral therapy, you can download Tip or Skip.  https://www.Xplornet.unbound technologies/    "

## 2023-06-26 NOTE — PROGRESS NOTES
Name: Brenna Soni  MRN: [id number]  : 1975    HPI  Patient presents to [facility] care. Primary concern today is fatigue over the past 4-5 months. She has trouble getting the motivation to go exercise. She has had many external stressors recently - hoping these resolve soon. Also has issues with sleep for several reasons listed below.     Review of Systems   Constitutional:  Positive for fatigue.   Cardiovascular:         Chest pressure - comes and goes over the past month; some relief with gavascon.    Psychiatric/Behavioral:  Positive for sleep disturbance (Problems falling asleep due to mind not being able to shuts down - has a diabetic dog that needs to be let out multiple times per night). The patient is nervous/anxious (lots of situational anxiety lately - issues taking care of her dog, her mother being sick and hospitalized, having to work during all of this).       Patient Active Problem List   Diagnosis    Acquired hypothyroidism    TBI (traumatic brain injury)    Localized adiposity    Chronic bilateral low back pain without sciatica    Lumbar spondylosis    Myofascial pain syndrome    Jersey finger, initial encounter    Finger mass, left    Hormone replacement therapy (HRT)       Current Outpatient Medications on File Prior to Visit   Medication Sig Dispense Refill    ibuprofen (ADVIL,MOTRIN) 800 MG tablet Take 1 tablet (800 mg total) by mouth every 8 (eight) hours as needed for Pain. 20 tablet 0    levothyroxine (SYNTHROID) 100 MCG tablet TAKE 1 TABLET(100 MCG) BY MOUTH EVERY DAY 90 tablet 3    progesterone (PROMETRIUM) 200 MG capsule Take 1 capsule (200 mg total) by mouth nightly. 30 capsule 11    semaglutide (OZEMPIC) 0.25 mg or 0.5 mg(2 mg/1.5 mL) pen injector Inject 0.25 mg into the skin every Monday.      [DISCONTINUED] estradioL 0.75 mg/0.75 gram (0.1%) GlPk Place 0.75 mg onto the skin once daily. 30 packet 11    [DISCONTINUED] ibuprofen (ADVIL,MOTRIN) 400 MG tablet Take 400 mg by mouth  every 6 (six) hours as needed for Other.       Current Facility-Administered Medications on File Prior to Visit   Medication Dose Route Frequency Provider Last Rate Last Admin    estradiol valerate (DELESTROGEN) injection 20 mg/mL  25 mg Intramuscular Q30 Days Nina Edwards MD   25 mg at 06/15/23 1052       Past Medical History:   Diagnosis Date    Abnormal Pap smear of cervix     cryo    Anal fissure     Hypothyroid 2013    Localized adiposity     trunk    Postmenopausal bleeding 2022    Rectal bleeding     TBI (traumatic brain injury)     concussion, CT done.      Vertigo     Water retention        Past Surgical History:   Procedure Laterality Date    ABDOMINOPLASTY  2015    AUGMENTATION OF BREAST Bilateral     Lift also     SECTION      COLONOSCOPY N/A 2022    Procedure: COLONOSCOPY;  Surgeon: Faith Valerio MD;  Location: [facility];  Service: Endoscopy;  Laterality: N/A;    ENDOMETRIAL ABLATION      HYSTEROSCOPY WITH DILATION AND CURETTAGE OF UTERUS N/A 2022    Procedure: HYSTEROSCOPY, WITH DILATION AND CURETTAGE OF UTERUS;  Surgeon: Nina Edwards MD;  Location: [facility];  Service: OB/GYN;  Laterality: N/A;    LAPAROTOMY, EXPLORATORY      X2 for endometriosis    SLEEVE GASTROPLASTY          Family History   Adopted: Yes   Problem Relation Age of Onset    Heart attack Other         Maternal side    Stroke Other         Maternal side       Social History     Socioeconomic History    Marital status:    Occupational History    Occupation:      Employer: [employer]   Tobacco Use    Smoking status: Never    Smokeless tobacco: Never   Substance and Sexual Activity    Alcohol use: Yes     Alcohol/week: 1.0 standard drink     Types: 1 Glasses of wine per week     Comment: 2-3 glasses of wine/month    Drug use: Never    Sexual activity: Not Currently     Partners: Male     Birth control/protection:  None   Social History Narrative    Lives with  and step son. One dog. Enjoys reading, exercising, playing piano, writing poems, travel.     Social Determinants of Health     Financial Resource Strain: Low Risk     Difficulty of Paying Living Expenses: Not hard at all   Food Insecurity: No Food Insecurity    Worried About Running Out of Food in the Last Year: Never true    Ran Out of Food in the Last Year: Never true   Transportation Needs: No Transportation Needs    Lack of Transportation (Medical): No    Lack of Transportation (Non-Medical): No   Physical Activity: Insufficiently Active    Days of Exercise per Week: 2 days    Minutes of Exercise per Session: 30 min   Stress: Stress Concern Present    Feeling of Stress : To some extent   Social Connections: Unknown    Frequency of Communication with Friends and Family: More than three times a week    Frequency of Social Gatherings with Friends and Family: Once a week    Active Member of Clubs or Organizations: Patient refused    Attends Club or Organization Meetings: Patient refused    Marital Status:    Housing Stability: Low Risk     Unable to Pay for Housing in the Last Year: No    Number of Places Lived in the Last Year: 1    Unstable Housing in the Last Year: No       Review of patient's allergies indicates:  No Known Allergies     Vitals:    06/26/23 0738   BP: 100/64   Pulse: 72        Physical Exam  Constitutional:       General: She is not in acute distress.     Appearance: Normal appearance. She is well-developed.   HENT:      Head: Normocephalic and atraumatic.      Right Ear: External ear normal.      Left Ear: External ear normal.   Eyes:      Conjunctiva/sclera: Conjunctivae normal.   Cardiovascular:      Rate and Rhythm: Normal rate and regular rhythm.      Heart sounds: No murmur heard.    No friction rub. No gallop.   Pulmonary:      Effort: Pulmonary effort is normal. No respiratory distress.      Breath sounds: No wheezing, rhonchi  or rales.   Abdominal:      General: Abdomen is flat. There is no distension.   Musculoskeletal:         General: No swelling or deformity.      Right lower leg: No edema.      Left lower leg: No edema.   Skin:     General: Skin is warm and dry.      Coloration: Skin is not jaundiced.   Neurological:      Mental Status: She is alert and oriented to person, place, and time. Mental status is at baseline.   Psychiatric:         Attention and Perception: Attention and perception normal.         Mood and Affect: Mood normal.         Speech: Speech normal.         Behavior: Behavior normal. Behavior is cooperative.         Thought Content: Thought content normal.         Cognition and Memory: Cognition normal.         Judgment: Judgment normal.        1. Healthcare maintenance  -     Hepatitis C Antibody; Future; Expected date: 06/26/2023    2. S/P gastric bypass  -     CBC Without Differential; Future; Expected date: 06/26/2023  -     Comprehensive Metabolic Panel; Future; Expected date: 06/26/2023  -     PTH, Intact; Future; Expected date: 06/26/2023  -     Vitamin A; Future; Expected date: 06/26/2023  -     Vitamin B12; Future; Expected date: 06/26/2023  -     Iron and TIBC; Future; Expected date: 06/26/2023  -     Ferritin; Future; Expected date: 06/26/2023  -     Soluble Transferrin Receptor; Future; Expected date: 06/26/2023  -     Misc Sendout Test, Blood 25-hydroxy vitamin D; Future; Expected date: 06/26/2023    3. Acquired hypothyroidism  -     TSH; Future; Expected date: 06/26/2023    4. Chronic fatigue  Assessment & Plan:  I think the fatigue described can be adequately explained with her combination of stress and poor sleep. Some information and resources on insomnia provided in After Visit Summary, but the sleep issue won't resolve so long as her dog needs to be let out multiple times per night. The patient feels some relief being off work for summer break and she feels she can manage her anxiety better at  this point.    We will grab TSH and blood counts/iron levels as part of her normal monitoring, but we will keep special attention on these labs.           Follow up in 1 year    Brian Sanchez MD  06/26/2023

## 2023-06-26 NOTE — ASSESSMENT & PLAN NOTE
I think the fatigue described can be adequately explained with her combination of stress and poor sleep. Some information and resources on insomnia provided in After Visit Summary, but the sleep issue won't resolve so long as her dog needs to be let out multiple times per night. The patient feels some relief being off work for summer break and she feels she can manage her anxiety better at this point.    We will grab TSH and blood counts/iron levels as part of her normal monitoring, but we will keep special attention on these labs.

## 2023-06-27 LAB
25(OH)D3+25(OH)D2 SERPL-MCNC: 23 NG/ML (ref 30–96)
STFR SERPL-MCNC: 1.3 MG/L (ref 1.8–4.6)

## 2023-06-29 LAB — VIT A SERPL-MCNC: 47 UG/DL (ref 38–106)

## 2023-07-13 ENCOUNTER — OFFICE VISIT (OUTPATIENT)
Dept: OBSTETRICS AND GYNECOLOGY | Facility: CLINIC | Age: 48
End: 2023-07-13
Payer: COMMERCIAL

## 2023-07-13 VITALS
WEIGHT: 135.38 LBS | SYSTOLIC BLOOD PRESSURE: 104 MMHG | BODY MASS INDEX: 23.11 KG/M2 | HEIGHT: 64 IN | DIASTOLIC BLOOD PRESSURE: 64 MMHG

## 2023-07-13 DIAGNOSIS — Z79.890 HORMONE REPLACEMENT THERAPY (HRT): Primary | ICD-10-CM

## 2023-07-13 PROCEDURE — 99213 OFFICE O/P EST LOW 20 MIN: CPT | Mod: 25,S$GLB,, | Performed by: OBSTETRICS & GYNECOLOGY

## 2023-07-13 PROCEDURE — 1159F MED LIST DOCD IN RCRD: CPT | Mod: CPTII,S$GLB,, | Performed by: OBSTETRICS & GYNECOLOGY

## 2023-07-13 PROCEDURE — 3078F DIAST BP <80 MM HG: CPT | Mod: CPTII,S$GLB,, | Performed by: OBSTETRICS & GYNECOLOGY

## 2023-07-13 PROCEDURE — 3074F SYST BP LT 130 MM HG: CPT | Mod: CPTII,S$GLB,, | Performed by: OBSTETRICS & GYNECOLOGY

## 2023-07-13 PROCEDURE — 1159F PR MEDICATION LIST DOCUMENTED IN MEDICAL RECORD: ICD-10-PCS | Mod: CPTII,S$GLB,, | Performed by: OBSTETRICS & GYNECOLOGY

## 2023-07-13 PROCEDURE — 3074F PR MOST RECENT SYSTOLIC BLOOD PRESSURE < 130 MM HG: ICD-10-PCS | Mod: CPTII,S$GLB,, | Performed by: OBSTETRICS & GYNECOLOGY

## 2023-07-13 PROCEDURE — 99999 PR PBB SHADOW E&M-EST. PATIENT-LVL III: CPT | Mod: PBBFAC,,, | Performed by: OBSTETRICS & GYNECOLOGY

## 2023-07-13 PROCEDURE — 3008F BODY MASS INDEX DOCD: CPT | Mod: CPTII,S$GLB,, | Performed by: OBSTETRICS & GYNECOLOGY

## 2023-07-13 PROCEDURE — 3078F PR MOST RECENT DIASTOLIC BLOOD PRESSURE < 80 MM HG: ICD-10-PCS | Mod: CPTII,S$GLB,, | Performed by: OBSTETRICS & GYNECOLOGY

## 2023-07-13 PROCEDURE — 99999 PR PBB SHADOW E&M-EST. PATIENT-LVL III: ICD-10-PCS | Mod: PBBFAC,,, | Performed by: OBSTETRICS & GYNECOLOGY

## 2023-07-13 PROCEDURE — 96372 THER/PROPH/DIAG INJ SC/IM: CPT | Mod: S$GLB,,, | Performed by: OBSTETRICS & GYNECOLOGY

## 2023-07-13 PROCEDURE — 96372 PR INJECTION,THERAP/PROPH/DIAG2ST, IM OR SUBCUT: ICD-10-PCS | Mod: S$GLB,,, | Performed by: OBSTETRICS & GYNECOLOGY

## 2023-07-13 PROCEDURE — 99213 PR OFFICE/OUTPT VISIT, EST, LEVL III, 20-29 MIN: ICD-10-PCS | Mod: 25,S$GLB,, | Performed by: OBSTETRICS & GYNECOLOGY

## 2023-07-13 PROCEDURE — 3008F PR BODY MASS INDEX (BMI) DOCUMENTED: ICD-10-PCS | Mod: CPTII,S$GLB,, | Performed by: OBSTETRICS & GYNECOLOGY

## 2023-07-13 RX ORDER — ESTRADIOL VALERATE 20 MG/ML
30 INJECTION INTRAMUSCULAR
Status: SHIPPED | OUTPATIENT
Start: 2023-07-13

## 2023-07-13 RX ADMIN — ESTRADIOL VALERATE 30 MG: 20 INJECTION INTRAMUSCULAR at 02:07

## 2023-07-13 NOTE — PROGRESS NOTES
Chief Complaint   Patient presents with    Follow-up       History of Present Illness: Brenna Soni is a 47 y.o. female that presents today 2023 for   Chief Complaint   Patient presents with    Follow-up         Past Medical History:   Diagnosis Date    Abnormal Pap smear of cervix     cryo    Anal fissure     Hypothyroid 2013    Localized adiposity     trunk    Postmenopausal bleeding 2022    Rectal bleeding     TBI (traumatic brain injury)     concussion, CT done.      Vertigo     Water retention        Past Surgical History:   Procedure Laterality Date    ABDOMINOPLASTY  2015    AUGMENTATION OF BREAST Bilateral     Lift also     SECTION      COLONOSCOPY N/A 2022    Procedure: COLONOSCOPY;  Surgeon: Faith Valerio MD;  Location: [facility];  Service: Endoscopy;  Laterality: N/A;    ENDOMETRIAL ABLATION      HYSTEROSCOPY WITH DILATION AND CURETTAGE OF UTERUS N/A 2022    Procedure: HYSTEROSCOPY, WITH DILATION AND CURETTAGE OF UTERUS;  Surgeon: Nina Edwards MD;  [facility];  Service: OB/GYN;  Laterality: N/A;    LAPAROTOMY, EXPLORATORY      X2 for endometriosis    SLEEVE GASTROPLASTY         Current Outpatient Medications   Medication Sig Dispense Refill    ibuprofen (ADVIL,MOTRIN) 800 MG tablet Take 1 tablet (800 mg total) by mouth every 8 (eight) hours as needed for Pain. 20 tablet 0    levothyroxine (SYNTHROID) 100 MCG tablet TAKE 1 TABLET(100 MCG) BY MOUTH EVERY DAY 90 tablet 3    progesterone (PROMETRIUM) 200 MG capsule Take 1 capsule (200 mg total) by mouth nightly. 30 capsule 11    semaglutide (OZEMPIC) 0.25 mg or 0.5 mg(2 mg/1.5 mL) pen injector Inject 0.25 mg into the skin every Monday.       Current Facility-Administered Medications   Medication Dose Route Frequency Provider Last Rate Last Admin    estradiol valerate (DELESTROGEN) injection 20 mg/mL  30 mg Intramuscular Q30 Days Nina Edwards MD   30 mg at 23  "[id number]       Review of patient's allergies indicates:  No Known Allergies    Family History   Adopted: Yes   Problem Relation Age of Onset    Heart attack Other         Maternal side    Stroke Other         Maternal side       Social History     Tobacco Use    Smoking status: Never    Smokeless tobacco: Never   Substance Use Topics    Alcohol use: Yes     Alcohol/week: 1.0 standard drink     Types: 1 Glasses of wine per week     Comment: 2-3 glasses of wine/month    Drug use: Never       OB History    Para Term  AB Living   1 1 1         SAB IAB Ectopic Multiple Live Births                  # Outcome Date GA Lbr Dmitriy/2nd Weight Sex Delivery Anes PTL Lv   1 Term                  /64   Ht 5' 4" (1.626 m)   Wt 61.4 kg (135 lb 5.8 oz)   LMP 2021       ASSESSMENT/PLAN:  Hormone replacement therapy (HRT)  Comments:  30 mg given today   repeat in 4 weeks  Orders:  -     estradiol valerate (DELESTROGEN) injection 20 mg/mL  -     ESTRADIOL; Future; Expected date: 2023      20 minutes spent today preparing reviewing previous external notes, reviewing previous results, performing medical examination, orders tests or medications, counseling and documenting.       "

## 2023-07-24 ENCOUNTER — LAB VISIT (OUTPATIENT)
Dept: LAB | Facility: HOSPITAL | Age: 48
End: 2023-07-24
Attending: OBSTETRICS & GYNECOLOGY
Payer: COMMERCIAL

## 2023-07-24 DIAGNOSIS — Z79.890 HORMONE REPLACEMENT THERAPY (HRT): ICD-10-CM

## 2023-07-24 LAB — ESTRADIOL SERPL-MCNC: 603 PG/ML

## 2023-07-24 PROCEDURE — 36415 COLL VENOUS BLD VENIPUNCTURE: CPT | Mod: PO | Performed by: OBSTETRICS & GYNECOLOGY

## 2023-07-24 PROCEDURE — 82670 ASSAY OF TOTAL ESTRADIOL: CPT | Performed by: OBSTETRICS & GYNECOLOGY

## 2023-07-25 ENCOUNTER — TELEPHONE (OUTPATIENT)
Dept: OBSTETRICS AND GYNECOLOGY | Facility: CLINIC | Age: 48
End: 2023-07-25
Payer: COMMERCIAL

## 2023-07-25 NOTE — TELEPHONE ENCOUNTER
Patient is scheduled for estrogen injection on 8/14. She had labs done and would like to know if her dose needs to be adjusted.

## 2023-07-25 NOTE — TELEPHONE ENCOUNTER
Spoke with patient. Injection and annual appt rescheduled. Message sent to Dr Edwards to see if estrogen dose needs to be adjusted for 8/14 injection.

## 2023-07-25 NOTE — TELEPHONE ENCOUNTER
----- Message from Catie Anna sent at 7/25/2023  8:47 AM CDT -----  Type: Needs Medical Advice  Who Called:  pt  Best Call Back Number: [phone]  Additional Information: pt has an appt on 8/15 would like to reschedule it to a different date, 9/14 is too late for her, pl call bk to advise thanks

## 2023-08-14 ENCOUNTER — CLINICAL SUPPORT (OUTPATIENT)
Dept: OBSTETRICS AND GYNECOLOGY | Facility: CLINIC | Age: 48
End: 2023-08-14
Payer: COMMERCIAL

## 2023-08-14 DIAGNOSIS — Z79.890 HORMONE REPLACEMENT THERAPY (HRT): Primary | ICD-10-CM

## 2023-08-14 PROCEDURE — 96372 THER/PROPH/DIAG INJ SC/IM: CPT | Mod: S$GLB,,, | Performed by: OBSTETRICS & GYNECOLOGY

## 2023-08-14 PROCEDURE — 96372 PR INJECTION,THERAP/PROPH/DIAG2ST, IM OR SUBCUT: ICD-10-PCS | Mod: S$GLB,,, | Performed by: OBSTETRICS & GYNECOLOGY

## 2023-08-14 RX ADMIN — ESTRADIOL VALERATE 30 MG: 20 INJECTION INTRAMUSCULAR at 02:08

## 2023-09-13 ENCOUNTER — CLINICAL SUPPORT (OUTPATIENT)
Dept: OBSTETRICS AND GYNECOLOGY | Facility: CLINIC | Age: 48
End: 2023-09-13
Payer: COMMERCIAL

## 2023-09-13 DIAGNOSIS — Z79.890 HORMONE REPLACEMENT THERAPY (HRT): Primary | ICD-10-CM

## 2023-09-13 PROCEDURE — 96372 THER/PROPH/DIAG INJ SC/IM: CPT | Mod: S$GLB,,, | Performed by: OBSTETRICS & GYNECOLOGY

## 2023-09-13 PROCEDURE — 96372 PR INJECTION,THERAP/PROPH/DIAG2ST, IM OR SUBCUT: ICD-10-PCS | Mod: S$GLB,,, | Performed by: OBSTETRICS & GYNECOLOGY

## 2023-09-13 PROCEDURE — 99999 PR PBB SHADOW E&M-EST. PATIENT-LVL I: CPT | Mod: PBBFAC,,,

## 2023-09-13 PROCEDURE — 99999 PR PBB SHADOW E&M-EST. PATIENT-LVL I: ICD-10-PCS | Mod: PBBFAC,,,

## 2023-09-13 RX ADMIN — ESTRADIOL VALERATE 30 MG: 20 INJECTION INTRAMUSCULAR at 10:09

## 2023-09-13 NOTE — PROGRESS NOTES
Patient here for Delestrogen  - Verified 2 pt identifiers, Allergies reviewed, routine injection.    Estradiol valerate 30mg (1.5mL) given in LUOQG. Patient tolerated well, next injection due 10/13/2023. Patient advised to wait 15mins in lobby and to report any adverse reaction. Patient verbalized understanding.

## 2023-10-11 ENCOUNTER — CLINICAL SUPPORT (OUTPATIENT)
Dept: OBSTETRICS AND GYNECOLOGY | Facility: CLINIC | Age: 48
End: 2023-10-11
Payer: COMMERCIAL

## 2023-10-11 DIAGNOSIS — Z79.890 HORMONE REPLACEMENT THERAPY (HRT): Primary | ICD-10-CM

## 2023-10-11 PROCEDURE — 96372 THER/PROPH/DIAG INJ SC/IM: CPT | Mod: S$GLB,,, | Performed by: OBSTETRICS & GYNECOLOGY

## 2023-10-11 PROCEDURE — 96372 PR INJECTION,THERAP/PROPH/DIAG2ST, IM OR SUBCUT: ICD-10-PCS | Mod: S$GLB,,, | Performed by: OBSTETRICS & GYNECOLOGY

## 2023-10-11 RX ADMIN — ESTRADIOL VALERATE 30 MG: 20 INJECTION INTRAMUSCULAR at 10:10

## 2023-10-11 NOTE — PROGRESS NOTES
Patient here for Delestrogen  - Verified 2 pt identifiers, Allergies reviewed, routine injection.    Estradiol valerate 30mg (1.5mL) given in RUOQG. Patient tolerated well, next injection due 11/10/2023. Patient advised to wait 15mins in lobby and to report any adverse reaction. Patient verbalized understanding.

## 2023-10-15 RX ORDER — LEVOTHYROXINE SODIUM 100 UG/1
TABLET ORAL
Qty: 90 TABLET | Refills: 2 | Status: SHIPPED | OUTPATIENT
Start: 2023-10-15 | End: 2024-02-28 | Stop reason: SDUPTHER

## 2023-10-15 NOTE — TELEPHONE ENCOUNTER
Refill Decision Note   Brenna Nae  is requesting a refill authorization.  Brief Assessment and Rationale for Refill:  Approve     Medication Therapy Plan:         Comments:     Note composed:1:33 AM 10/15/2023

## 2023-11-08 ENCOUNTER — CLINICAL SUPPORT (OUTPATIENT)
Dept: OBSTETRICS AND GYNECOLOGY | Facility: CLINIC | Age: 48
End: 2023-11-08
Payer: COMMERCIAL

## 2023-11-08 DIAGNOSIS — Z79.890 HORMONE REPLACEMENT THERAPY (HRT): Primary | ICD-10-CM

## 2023-11-08 PROCEDURE — 99999 PR PBB SHADOW E&M-EST. PATIENT-LVL I: ICD-10-PCS | Mod: PBBFAC,,,

## 2023-11-08 PROCEDURE — 96372 PR INJECTION,THERAP/PROPH/DIAG2ST, IM OR SUBCUT: ICD-10-PCS | Mod: S$GLB,,, | Performed by: OBSTETRICS & GYNECOLOGY

## 2023-11-08 PROCEDURE — 99999 PR PBB SHADOW E&M-EST. PATIENT-LVL I: CPT | Mod: PBBFAC,,,

## 2023-11-08 PROCEDURE — 96372 THER/PROPH/DIAG INJ SC/IM: CPT | Mod: S$GLB,,, | Performed by: OBSTETRICS & GYNECOLOGY

## 2023-11-08 RX ADMIN — ESTRADIOL VALERATE 30 MG: 20 INJECTION INTRAMUSCULAR at 09:11

## 2023-11-08 NOTE — PROGRESS NOTES
Patient here for Delestrogen  - Verified 2 pt identifiers, Allergies reviewed, routine injection.    Estradiol valerate 30mg (1.5mL) given in RUOQG Patient tolerated well, next injection due 12/8/23. Patient advised to wait 15mins in lobby for observation and to report any adverse reactions. Patient verbalized understanding.

## 2023-12-06 ENCOUNTER — CLINICAL SUPPORT (OUTPATIENT)
Dept: OBSTETRICS AND GYNECOLOGY | Facility: CLINIC | Age: 48
End: 2023-12-06
Payer: COMMERCIAL

## 2023-12-06 DIAGNOSIS — Z79.890 HORMONE REPLACEMENT THERAPY (HRT): Primary | ICD-10-CM

## 2023-12-06 PROCEDURE — 96372 THER/PROPH/DIAG INJ SC/IM: CPT | Mod: S$GLB,,, | Performed by: OBSTETRICS & GYNECOLOGY

## 2023-12-06 PROCEDURE — 96372 PR INJECTION,THERAP/PROPH/DIAG2ST, IM OR SUBCUT: ICD-10-PCS | Mod: S$GLB,,, | Performed by: OBSTETRICS & GYNECOLOGY

## 2023-12-06 RX ADMIN — ESTRADIOL VALERATE 30 MG: 20 INJECTION INTRAMUSCULAR at 10:12

## 2023-12-06 NOTE — PROGRESS NOTES
Patient here for HRT delestrogen 1.5ml given to left upper outer quad gluteus. Patient tolerated well. Next injection 1 month

## 2024-01-02 ENCOUNTER — PATIENT MESSAGE (OUTPATIENT)
Dept: FAMILY MEDICINE | Facility: CLINIC | Age: 49
End: 2024-01-02
Payer: COMMERCIAL

## 2024-01-03 ENCOUNTER — CLINICAL SUPPORT (OUTPATIENT)
Dept: OBSTETRICS AND GYNECOLOGY | Facility: CLINIC | Age: 49
End: 2024-01-03
Payer: COMMERCIAL

## 2024-01-03 DIAGNOSIS — Z79.890 HORMONE REPLACEMENT THERAPY (HRT): Primary | ICD-10-CM

## 2024-01-03 PROCEDURE — 99999 PR PBB SHADOW E&M-EST. PATIENT-LVL I: CPT | Mod: PBBFAC,,,

## 2024-01-03 NOTE — PROGRESS NOTES
Patient arrived on time for appointment. Allergies verified. Denied any complaints. Previous appointment scheduled incorrectly. New appointment had to be rescheduled. Previous appointment information stored in chart.

## 2024-01-22 ENCOUNTER — TELEPHONE (OUTPATIENT)
Dept: OBSTETRICS AND GYNECOLOGY | Facility: CLINIC | Age: 49
End: 2024-01-22
Payer: COMMERCIAL

## 2024-01-22 NOTE — TELEPHONE ENCOUNTER
----- Message from Saumya Garcia sent at 1/22/2024  1:38 PM CST -----  Type:  Reschedule Appointment Request    Caller is requesting to reschedule appointment.      Name of Caller:pt    When is the first available appointment?na    Symptoms:injection    Would the patient rather a call back or a response via getbetter!ner? Call back    Best Call Back Number:[phone]      Additional Information: appt was scheduled on 1/31 but needs to reschedule     Please call Back to advise. Thanks!

## 2024-01-22 NOTE — TELEPHONE ENCOUNTER
----- Message from Jerry Abel sent at 1/22/2024  4:50 PM CST -----  Regarding: ret call  Contact: brenna at [phone]  Type:  Patient Returning Call    Who Called:  Brenna    Who Left Message for Patient:  Alberta    Does the patient know what this is regarding?:  yes    Best Call Back Number:  [phone]    Additional Information:

## 2024-01-31 ENCOUNTER — CLINICAL SUPPORT (OUTPATIENT)
Dept: OBSTETRICS AND GYNECOLOGY | Facility: CLINIC | Age: 49
End: 2024-01-31
Payer: COMMERCIAL

## 2024-01-31 DIAGNOSIS — Z79.890 HORMONE REPLACEMENT THERAPY (HRT): Primary | ICD-10-CM

## 2024-01-31 PROCEDURE — 99999 PR PBB SHADOW E&M-EST. PATIENT-LVL I: CPT | Mod: PBBFAC,,,

## 2024-01-31 PROCEDURE — 96372 THER/PROPH/DIAG INJ SC/IM: CPT | Mod: S$GLB,,, | Performed by: OBSTETRICS & GYNECOLOGY

## 2024-01-31 RX ADMIN — ESTRADIOL VALERATE 30 MG: 20 INJECTION INTRAMUSCULAR at 09:01

## 2024-01-31 NOTE — PROGRESS NOTES
Patient received Delestrogen 30mg injection, left ventrogluteral. Tolerated well. No adverse reactions noted. Allergies verified. No further complaints at this time.

## 2024-02-19 ENCOUNTER — PATIENT MESSAGE (OUTPATIENT)
Dept: ADMINISTRATIVE | Facility: HOSPITAL | Age: 49
End: 2024-02-19
Payer: COMMERCIAL

## 2024-02-20 ENCOUNTER — PATIENT OUTREACH (OUTPATIENT)
Dept: ADMINISTRATIVE | Facility: HOSPITAL | Age: 49
End: 2024-02-20
Payer: COMMERCIAL

## 2024-02-20 ENCOUNTER — PATIENT MESSAGE (OUTPATIENT)
Dept: ADMINISTRATIVE | Facility: HOSPITAL | Age: 49
End: 2024-02-20
Payer: COMMERCIAL

## 2024-02-20 DIAGNOSIS — Z12.31 VISIT FOR SCREENING MAMMOGRAM: Primary | ICD-10-CM

## 2024-02-20 NOTE — PROGRESS NOTES
Population Health Chart Review & Patient Outreach Details    Outreach Performed: YES Portal    Additional [facility] Health Notes:           Updates Requested / Reviewed:               Health Maintenance Topics Overdue:    Health Maintenance Due   Topic Date Due    HIV Screening  Never done    Influenza Vaccine (1) 09/01/2023    COVID-19 Vaccine (4 - 2023-24 season) 09/01/2023    Shingles Vaccine (1 of 2) Never done    Mammogram  04/20/2024         Health Maintenance Topic(s) Outreach Outcomes & Actions Taken:    Breast Cancer Screening - Outreach Outcomes & Actions Taken  : Mammogram Order Placed

## 2024-02-28 ENCOUNTER — OFFICE VISIT (OUTPATIENT)
Dept: OBSTETRICS AND GYNECOLOGY | Facility: CLINIC | Age: 49
End: 2024-02-28
Payer: COMMERCIAL

## 2024-02-28 VITALS
SYSTOLIC BLOOD PRESSURE: 100 MMHG | DIASTOLIC BLOOD PRESSURE: 80 MMHG | WEIGHT: 143.31 LBS | HEIGHT: 64 IN | BODY MASS INDEX: 24.46 KG/M2

## 2024-02-28 DIAGNOSIS — Z01.419 ROUTINE GYNECOLOGICAL EXAMINATION: Primary | ICD-10-CM

## 2024-02-28 DIAGNOSIS — Z98.890 S/P ENDOMETRIAL ABLATION: ICD-10-CM

## 2024-02-28 DIAGNOSIS — Z12.31 ENCOUNTER FOR SCREENING MAMMOGRAM FOR BREAST CANCER: ICD-10-CM

## 2024-02-28 DIAGNOSIS — E03.9 ACQUIRED HYPOTHYROIDISM: ICD-10-CM

## 2024-02-28 DIAGNOSIS — N92.1 METRORRHAGIA: ICD-10-CM

## 2024-02-28 DIAGNOSIS — R23.2 HOT FLASHES: ICD-10-CM

## 2024-02-28 DIAGNOSIS — N95.1 PERIMENOPAUSE: ICD-10-CM

## 2024-02-28 DIAGNOSIS — Z79.890 HORMONE REPLACEMENT THERAPY (HRT): ICD-10-CM

## 2024-02-28 PROCEDURE — 1159F MED LIST DOCD IN RCRD: CPT | Mod: CPTII,S$GLB,, | Performed by: OBSTETRICS & GYNECOLOGY

## 2024-02-28 PROCEDURE — 99396 PREV VISIT EST AGE 40-64: CPT | Mod: 25,S$GLB,, | Performed by: OBSTETRICS & GYNECOLOGY

## 2024-02-28 PROCEDURE — 3074F SYST BP LT 130 MM HG: CPT | Mod: CPTII,S$GLB,, | Performed by: OBSTETRICS & GYNECOLOGY

## 2024-02-28 PROCEDURE — 99999 PR PBB SHADOW E&M-EST. PATIENT-LVL III: CPT | Mod: PBBFAC,,, | Performed by: OBSTETRICS & GYNECOLOGY

## 2024-02-28 PROCEDURE — 87624 HPV HI-RISK TYP POOLED RSLT: CPT | Performed by: OBSTETRICS & GYNECOLOGY

## 2024-02-28 PROCEDURE — 96372 THER/PROPH/DIAG INJ SC/IM: CPT | Mod: S$GLB,,, | Performed by: OBSTETRICS & GYNECOLOGY

## 2024-02-28 PROCEDURE — 3008F BODY MASS INDEX DOCD: CPT | Mod: CPTII,S$GLB,, | Performed by: OBSTETRICS & GYNECOLOGY

## 2024-02-28 PROCEDURE — 3079F DIAST BP 80-89 MM HG: CPT | Mod: CPTII,S$GLB,, | Performed by: OBSTETRICS & GYNECOLOGY

## 2024-02-28 PROCEDURE — 88175 CYTOPATH C/V AUTO FLUID REDO: CPT | Performed by: OBSTETRICS & GYNECOLOGY

## 2024-02-28 RX ORDER — PROGESTERONE 200 MG/1
200 CAPSULE ORAL NIGHTLY
Qty: 30 CAPSULE | Refills: 11 | Status: SHIPPED | OUTPATIENT
Start: 2024-02-28 | End: 2024-03-26

## 2024-02-28 RX ORDER — LEVOTHYROXINE SODIUM 100 UG/1
100 TABLET ORAL DAILY
Qty: 90 TABLET | Refills: 2 | Status: SHIPPED | OUTPATIENT
Start: 2024-02-28

## 2024-02-28 RX ORDER — EPINEPHRINE 0.3 MG/.3ML
INJECTION SUBCUTANEOUS
COMMUNITY
Start: 2024-01-26

## 2024-02-28 RX ADMIN — ESTRADIOL VALERATE 30 MG: 20 INJECTION INTRAMUSCULAR at 10:02

## 2024-02-28 NOTE — PROGRESS NOTES
Chief Complaint   Patient presents with    Well Woman       History of Present Illness: Brenna Soni is a 48 y.o. female that presents today 2024 with Patient's last menstrual period was 2021.  for well gyn visit.    Past Medical History:   Diagnosis Date    Abnormal Pap smear of cervix     cryo    Anal fissure     Hypothyroid 2013    Localized adiposity     trunk    Postmenopausal bleeding 2022    Rectal bleeding     TBI (traumatic brain injury)     concussion, CT done.      Vertigo     Water retention        Past Surgical History:   Procedure Laterality Date    ABDOMINOPLASTY  2015    AUGMENTATION OF BREAST Bilateral     Lift also     SECTION      COLONOSCOPY N/A 2022    Procedure: COLONOSCOPY;  Surgeon: Faith Valerio MD;  Location: [facility];  Service: Endoscopy;  Laterality: N/A;    ENDOMETRIAL ABLATION      HYSTEROSCOPY WITH DILATION AND CURETTAGE OF UTERUS N/A 2022    Procedure: HYSTEROSCOPY, WITH DILATION AND CURETTAGE OF UTERUS;  Surgeon: Nina Edwards MD;  Location: [facility];  Service: OB/GYN;  Laterality: N/A;    LAPAROTOMY, EXPLORATORY      X2 for endometriosis    SLEEVE GASTROPLASTY  2010       Outpatient Medications Prior to Visit   Medication Sig Dispense Refill    semaglutide (OZEMPIC) 0.25 mg or 0.5 mg(2 mg/1.5 mL) pen injector Inject 0.25 mg into the skin every Monday.      levothyroxine (SYNTHROID) 100 MCG tablet TAKE 1 TABLET(100 MCG) BY MOUTH EVERY DAY 90 tablet 2    progesterone (PROMETRIUM) 200 MG capsule Take 1 capsule (200 mg total) by mouth nightly. 30 capsule 11    EPINEPHrine (EPIPEN) 0.3 mg/0.3 mL AtIn SMARTSI Pre-Filled Pen Syringe IM Once PRN      ibuprofen (ADVIL,MOTRIN) 800 MG tablet Take 1 tablet (800 mg total) by mouth every 8 (eight) hours as needed for Pain. (Patient not taking: Reported on 2024) 20 tablet 0     Facility-Administered Medications Prior to Visit   Medication Dose Route Frequency  Provider Last Rate Last Admin    estradiol valerate (DELESTROGEN) injection 20 mg/mL  30 mg Intramuscular Q30 Days Nina Edwards MD   30 mg at 01/31/24 0935       Review of patient's allergies indicates:  No Known Allergies    Family History   Adopted: Yes   Problem Relation Age of Onset    Heart attack Other         Maternal side    Stroke Other         Maternal side       Social History     Socioeconomic History    Marital status:    Occupational History    Occupation:      Employer: [employer]   Tobacco Use    Smoking status: Never    Smokeless tobacco: Never   Substance and Sexual Activity    Alcohol use: Yes     Alcohol/week: 1.0 standard drink of alcohol     Types: 1 Glasses of wine per week     Comment: 2-3 glasses of wine/month    Drug use: Never    Sexual activity: Not Currently     Partners: Male     Birth control/protection: None   Social History Narrative    Lives with  and step son. One dog. Enjoys reading, exercising, playing piano, writing poems, travel.     Social Determinants of Health     Financial Resource Strain: Low Risk  (6/25/2023)    Overall Financial Resource Strain (CARDIA)     Difficulty of Paying Living Expenses: Not hard at all   Food Insecurity: No Food Insecurity (6/25/2023)    Hunger Vital Sign     Worried About Running Out of Food in the Last Year: Never true     Ran Out of Food in the Last Year: Never true   Transportation Needs: No Transportation Needs (6/25/2023)    PRAPARE - Transportation     Lack of Transportation (Medical): No     Lack of Transportation (Non-Medical): No   Physical Activity: Insufficiently Active (6/25/2023)    Exercise Vital Sign     Days of Exercise per Week: 2 days     Minutes of Exercise per Session: 30 min   Stress: Stress Concern Present (6/25/2023)    [employer] [place] [employer] - Occupational Stress Questionnaire     Feeling of Stress : To some extent   Social Connections:  "Unknown (2023)    Social Connection and Isolation Panel [NHANES]     Frequency of Communication with Friends and Family: More than three times a week     Frequency of Social Gatherings with Friends and Family: Once a week     Active Member of Clubs or Organizations: Patient declined     Attends Club or Organization Meetings: Patient declined     Marital Status:    Housing Stability: Low Risk  (2023)    Housing Stability Vital Sign     Unable to Pay for Housing in the Last Year: No     Number of Places Lived in the Last Year: 1     Unstable Housing in the Last Year: No       OB History    Para Term  AB Living   1 1 1         SAB IAB Ectopic Multiple Live Births                  # Outcome Date GA Lbr Dmitriy/2nd Weight Sex Delivery Anes PTL Lv   1 Term                Review of Symptoms:  GENERAL: Denies weight gain or weight loss. Feeling well overall.   SKIN: Denies rash or lesions.   HEAD: Denies head injury or headache.   NODES: Denies enlarged lymph nodes.   CHEST: Denies chest pain or shortness of breath.   CARDIOVASCULAR: Denies palpitations or left sided chest pain.   ABDOMEN: No abdominal pain, constipation, diarrhea, nausea, vomiting or rectal bleeding.   URINARY: No frequency, dysuria, hematuria, or burning on urination.  HEMATOLOGIC: No easy bruisability or excessive bleeding.   MUSCULOSKELETAL: Denies joint pain or swelling.     /80   Ht 5' 4" (1.626 m)   Wt 65 kg (143 lb 4.8 oz)   LMP 2021   Physical Exam:  APPEARANCE: Well nourished, well developed, in no acute distress.  SKIN: Normal skin turgor, no lesions.  NECK: Neck symmetric without masses   RESPIRATORY: Normal respiratory effort with no retractions or use of accessory muscles  CARDIOVASCULAR: Peripheral vascular system with no swelling no varicosities and palpation of pulses normal  LYMPHATIC: No enlargements of the lymph nodes noted in the neck, axillae, or groin  ABDOMEN: Soft. No tenderness or masses. " No hepatosplenomegaly. No hernias.  BREASTS: Symmetrical, no skin changes or visible lesions. No palpable masses, nipple discharge or adenopathy bilaterally.  PELVIC: Normal external female genitalia without lesions. Normal hair distribution. Adequate perineal body, normal urethral meatus. Urethra with no masses.  Bladder nontender. Vagina moist and well rugated without lesions or discharge. Cervix pink and without lesions. No significant cystocele or rectocele. Bimanual exam showed uterus normal size, shape, position, mobile and nontender. Adnexa without masses or tenderness. Urethra and bladder normal.   EXTREMITIES: No clubbing cyanosis or edema.    ASSESSMENT/PLAN:  Routine gynecological examination  -     Liquid-Based Pap Smear, Screening  -     HPV High Risk Genotypes, PCR    Perimenopause  -     progesterone (PROMETRIUM) 200 MG capsule; Take 1 capsule (200 mg total) by mouth nightly.  Dispense: 30 capsule; Refill: 11    Hormone replacement therapy (HRT)  -     progesterone (PROMETRIUM) 200 MG capsule; Take 1 capsule (200 mg total) by mouth nightly.  Dispense: 30 capsule; Refill: 11    Hot flashes  -     progesterone (PROMETRIUM) 200 MG capsule; Take 1 capsule (200 mg total) by mouth nightly.  Dispense: 30 capsule; Refill: 11    Metrorrhagia  -     progesterone (PROMETRIUM) 200 MG capsule; Take 1 capsule (200 mg total) by mouth nightly.  Dispense: 30 capsule; Refill: 11    S/P endometrial ablation  -     progesterone (PROMETRIUM) 200 MG capsule; Take 1 capsule (200 mg total) by mouth nightly.  Dispense: 30 capsule; Refill: 11    Encounter for screening mammogram for breast cancer    Acquired hypothyroidism  -     levothyroxine (SYNTHROID) 100 MCG tablet; Take 1 tablet (100 mcg total) by mouth once daily.  Dispense: 90 tablet; Refill: 2          Patient was counseled today on Pelvic exams and Pap Smear guidelines.   We discussed STD screening if at high risk for a STD.  We discussed recommendation for breast  cancer screening with mammogram every other year after the age of 40 and annually after the age of 50.    We discussed colon cancer screening when indicated.   Osteoporosis screening discussed when indicated.   She was advised to see her primary care physician for all other health maintenance.     FOLLOW-UP with me for next routine visit.

## 2024-03-05 LAB
FINAL PATHOLOGIC DIAGNOSIS: NORMAL
Lab: NORMAL

## 2024-03-20 ENCOUNTER — PATIENT MESSAGE (OUTPATIENT)
Dept: OBSTETRICS AND GYNECOLOGY | Facility: CLINIC | Age: 49
End: 2024-03-20
Payer: COMMERCIAL

## 2024-03-21 DIAGNOSIS — N92.1 METRORRHAGIA: Primary | ICD-10-CM

## 2024-03-22 ENCOUNTER — HOSPITAL ENCOUNTER (OUTPATIENT)
Dept: RADIOLOGY | Facility: HOSPITAL | Age: 49
Discharge: HOME | End: 2024-03-22
Attending: OBSTETRICS & GYNECOLOGY
Payer: COMMERCIAL

## 2024-03-22 DIAGNOSIS — N92.1 METRORRHAGIA: ICD-10-CM

## 2024-03-22 PROCEDURE — 76856 US EXAM PELVIC COMPLETE: CPT | Mod: 26,,, | Performed by: RADIOLOGY

## 2024-03-22 PROCEDURE — 76830 TRANSVAGINAL US NON-OB: CPT | Mod: TC,PN

## 2024-03-22 PROCEDURE — 76830 TRANSVAGINAL US NON-OB: CPT | Mod: 26,,, | Performed by: RADIOLOGY

## 2024-03-26 ENCOUNTER — OFFICE VISIT (OUTPATIENT)
Dept: OBSTETRICS AND GYNECOLOGY | Facility: CLINIC | Age: 49
End: 2024-03-26
Payer: COMMERCIAL

## 2024-03-26 VITALS
DIASTOLIC BLOOD PRESSURE: 76 MMHG | WEIGHT: 147.69 LBS | BODY MASS INDEX: 25.21 KG/M2 | RESPIRATION RATE: 18 BRPM | HEIGHT: 64 IN | SYSTOLIC BLOOD PRESSURE: 102 MMHG

## 2024-03-26 DIAGNOSIS — Z79.890 HORMONE REPLACEMENT THERAPY (HRT): ICD-10-CM

## 2024-03-26 DIAGNOSIS — R23.2 HOT FLASHES: ICD-10-CM

## 2024-03-26 DIAGNOSIS — N93.9 VAGINAL BLEEDING: Primary | ICD-10-CM

## 2024-03-26 DIAGNOSIS — R14.0 BLOATING: ICD-10-CM

## 2024-03-26 DIAGNOSIS — Z98.890 S/P ENDOMETRIAL ABLATION: ICD-10-CM

## 2024-03-26 DIAGNOSIS — N92.1 METRORRHAGIA: ICD-10-CM

## 2024-03-26 DIAGNOSIS — N95.1 PERIMENOPAUSE: ICD-10-CM

## 2024-03-26 PROCEDURE — 3008F BODY MASS INDEX DOCD: CPT | Mod: CPTII,S$GLB,, | Performed by: OBSTETRICS & GYNECOLOGY

## 2024-03-26 PROCEDURE — 99214 OFFICE O/P EST MOD 30 MIN: CPT | Mod: 25,S$GLB,, | Performed by: OBSTETRICS & GYNECOLOGY

## 2024-03-26 PROCEDURE — 99999 PR PBB SHADOW E&M-EST. PATIENT-LVL III: CPT | Mod: PBBFAC,,, | Performed by: OBSTETRICS & GYNECOLOGY

## 2024-03-26 PROCEDURE — 1159F MED LIST DOCD IN RCRD: CPT | Mod: CPTII,S$GLB,, | Performed by: OBSTETRICS & GYNECOLOGY

## 2024-03-26 PROCEDURE — 3074F SYST BP LT 130 MM HG: CPT | Mod: CPTII,S$GLB,, | Performed by: OBSTETRICS & GYNECOLOGY

## 2024-03-26 PROCEDURE — 3078F DIAST BP <80 MM HG: CPT | Mod: CPTII,S$GLB,, | Performed by: OBSTETRICS & GYNECOLOGY

## 2024-03-26 PROCEDURE — 96372 THER/PROPH/DIAG INJ SC/IM: CPT | Mod: S$GLB,,, | Performed by: OBSTETRICS & GYNECOLOGY

## 2024-03-26 RX ORDER — PROGESTERONE 100 MG/1
100 CAPSULE ORAL NIGHTLY
Qty: 90 CAPSULE | Refills: 3 | Status: SHIPPED | OUTPATIENT
Start: 2024-03-26 | End: 2025-03-26

## 2024-03-26 RX ORDER — PROGESTERONE 200 MG/1
200 CAPSULE ORAL NIGHTLY
Qty: 90 CAPSULE | Refills: 3 | Status: SHIPPED | OUTPATIENT
Start: 2024-03-26

## 2024-03-26 RX ADMIN — ESTRADIOL VALERATE 30 MG: 20 INJECTION INTRAMUSCULAR at 09:03

## 2024-03-26 NOTE — PROGRESS NOTES
Chief Complaint   Patient presents with    Results     Vaginal bleeding stopped on        History of Present Illness: Brenna Soni is a 48 y.o. female that presents today 3/26/2024 for   Chief Complaint   Patient presents with    Results     Vaginal bleeding stopped on  and no bleeding since then    Until recently dark old blood for Tuesday- with panty liner 2 times daily    On HRT IM del estrogen for @ 1 yr.    Past Medical History:   Diagnosis Date    Abnormal Pap smear of cervix     cryo    Anal fissure     Hypothyroid 2013    Localized adiposity     trunk    Postmenopausal bleeding 2022    Rectal bleeding     TBI (traumatic brain injury)     concussion, CT done.      Vertigo     Water retention        Past Surgical History:   Procedure Laterality Date    ABDOMINOPLASTY  2015    AUGMENTATION OF BREAST Bilateral     Lift also     SECTION      COLONOSCOPY N/A 2022    Procedure: COLONOSCOPY;  Surgeon: Faith Valerio MD;  Location: [facility];  Service: Endoscopy;  Laterality: N/A;    ENDOMETRIAL ABLATION      HYSTEROSCOPY WITH DILATION AND CURETTAGE OF UTERUS N/A 2022    Procedure: HYSTEROSCOPY, WITH DILATION AND CURETTAGE OF UTERUS;  Surgeon: Nina Edwards MD;  Location: [facility];  Service: OB/GYN;  Laterality: N/A;    LAPAROSCOPY      X2 endometriosis    SLEEVE GASTROPLASTY         Outpatient Medications Prior to Visit   Medication Sig Dispense Refill    EPINEPHrine (EPIPEN) 0.3 mg/0.3 mL AtIn SMARTSI Pre-Filled Pen Syringe IM Once PRN      ibuprofen (ADVIL,MOTRIN) 800 MG tablet Take 1 tablet (800 mg total) by mouth every 8 (eight) hours as needed for Pain. 20 tablet 0    levothyroxine (SYNTHROID) 100 MCG tablet Take 1 tablet (100 mcg total) by mouth once daily. 90 tablet 2    semaglutide (OZEMPIC) 0.25 mg or 0.5 mg(2 mg/1.5 mL) pen injector Inject 0.25 mg into the skin every Monday.      progesterone  "(PROMETRIUM) 200 MG capsule Take 1 capsule (200 mg total) by mouth nightly. 30 capsule 11     Facility-Administered Medications Prior to Visit   Medication Dose Route Frequency Provider Last Rate Last Admin    estradiol valerate (DELESTROGEN) injection 20 mg/mL  30 mg Intramuscular Q30 Days Nina Edwards MD   30 mg at 24 0931       Review of patient's allergies indicates:  No Known Allergies    Family History   Adopted: Yes   Problem Relation Age of Onset    Heart attack Other         Maternal side    Stroke Other         Maternal side       Social History     Tobacco Use    Smoking status: Never    Smokeless tobacco: Never   Substance Use Topics    Alcohol use: Yes     Alcohol/week: 1.0 standard drink of alcohol     Types: 1 Glasses of wine per week     Comment: 2-3 glasses of wine/month    Drug use: Never       OB History    Para Term  AB Living   1 1 1         SAB IAB Ectopic Multiple Live Births                  # Outcome Date GA Lbr Dmitriy/2nd Weight Sex Delivery Anes PTL Lv   1 Term                  /76   Resp 18   Ht 5' 4" (1.626 m)   Wt 67 kg (147 lb 11.3 oz)   LMP 2021       ASSESSMENT/PLAN:  Vaginal bleeding  Comments:  discussed hysterectomy and pt declines  Orders:  -     progesterone (PROMETRIUM) 200 MG capsule; Take 1 capsule (200 mg total) by mouth nightly.  Dispense: 90 capsule; Refill: 3  -     progesterone (PROMETRIUM) 100 MG capsule; Take 1 capsule (100 mg total) by mouth nightly.  Dispense: 90 capsule; Refill: 3    Bloating    Perimenopause  -     progesterone (PROMETRIUM) 200 MG capsule; Take 1 capsule (200 mg total) by mouth nightly.  Dispense: 90 capsule; Refill: 3    Hormone replacement therapy (HRT)  -     progesterone (PROMETRIUM) 200 MG capsule; Take 1 capsule (200 mg total) by mouth nightly.  Dispense: 90 capsule; Refill: 3    Hot flashes  -     progesterone (PROMETRIUM) 200 MG capsule; Take 1 capsule (200 mg total) by mouth nightly.  Dispense: 90 " capsule; Refill: 3    Metrorrhagia  -     progesterone (PROMETRIUM) 200 MG capsule; Take 1 capsule (200 mg total) by mouth nightly.  Dispense: 90 capsule; Refill: 3    S/P endometrial ablation  -     progesterone (PROMETRIUM) 200 MG capsule; Take 1 capsule (200 mg total) by mouth nightly.  Dispense: 90 capsule; Refill: 3      30 minutes spent today spent preparing reviewing previous external notes, reviewing previous results, performing medical examination, ordering tests or medications, counseling and documenting.

## 2024-05-01 ENCOUNTER — CLINICAL SUPPORT (OUTPATIENT)
Dept: OBSTETRICS AND GYNECOLOGY | Facility: CLINIC | Age: 49
End: 2024-05-01
Payer: COMMERCIAL

## 2024-05-01 DIAGNOSIS — Z79.890 HORMONE REPLACEMENT THERAPY (HRT): Primary | ICD-10-CM

## 2024-05-01 PROCEDURE — 96372 THER/PROPH/DIAG INJ SC/IM: CPT | Mod: S$GLB,,, | Performed by: OBSTETRICS & GYNECOLOGY

## 2024-05-01 RX ADMIN — ESTRADIOL VALERATE 30 MG: 20 INJECTION INTRAMUSCULAR at 03:05

## 2024-05-01 NOTE — PROGRESS NOTES
2 pt identifiers verified. Tolerated delestrogen 30mg injection well. Instructed to return in 4 weeks for next injection

## 2024-05-29 ENCOUNTER — CLINICAL SUPPORT (OUTPATIENT)
Dept: OBSTETRICS AND GYNECOLOGY | Facility: CLINIC | Age: 49
End: 2024-05-29
Payer: COMMERCIAL

## 2024-05-29 DIAGNOSIS — Z79.890 HORMONE REPLACEMENT THERAPY (HRT): Primary | ICD-10-CM

## 2024-05-29 PROCEDURE — 96372 THER/PROPH/DIAG INJ SC/IM: CPT | Mod: S$GLB,,, | Performed by: OBSTETRICS & GYNECOLOGY

## 2024-05-29 RX ADMIN — ESTRADIOL VALERATE 30 MG: 20 INJECTION INTRAMUSCULAR at 01:05

## 2024-05-29 NOTE — PROGRESS NOTES
2 pt identifiers verified. Pt tolerated delestrogen injection well. Instructed to return in 4 weeks for next injection

## 2024-06-18 ENCOUNTER — LAB VISIT (OUTPATIENT)
Dept: LAB | Facility: HOSPITAL | Age: 49
End: 2024-06-18
Attending: STUDENT IN AN ORGANIZED HEALTH CARE EDUCATION/TRAINING PROGRAM
Payer: COMMERCIAL

## 2024-06-18 ENCOUNTER — OFFICE VISIT (OUTPATIENT)
Dept: FAMILY MEDICINE | Facility: CLINIC | Age: 49
End: 2024-06-18
Payer: COMMERCIAL

## 2024-06-18 VITALS
SYSTOLIC BLOOD PRESSURE: 112 MMHG | BODY MASS INDEX: 25.8 KG/M2 | OXYGEN SATURATION: 98 % | DIASTOLIC BLOOD PRESSURE: 74 MMHG | WEIGHT: 151.13 LBS | HEIGHT: 64 IN | HEART RATE: 68 BPM

## 2024-06-18 DIAGNOSIS — Z98.84 S/P GASTRIC BYPASS: ICD-10-CM

## 2024-06-18 DIAGNOSIS — R40.0 DAYTIME SOMNOLENCE: ICD-10-CM

## 2024-06-18 DIAGNOSIS — Z98.84 S/P GASTRIC BYPASS: Primary | ICD-10-CM

## 2024-06-18 DIAGNOSIS — F41.9 ANXIETY: ICD-10-CM

## 2024-06-18 DIAGNOSIS — R53.82 CHRONIC FATIGUE: ICD-10-CM

## 2024-06-18 DIAGNOSIS — E03.9 ACQUIRED HYPOTHYROIDISM: ICD-10-CM

## 2024-06-18 DIAGNOSIS — Z79.890 HORMONE REPLACEMENT THERAPY (HRT): ICD-10-CM

## 2024-06-18 DIAGNOSIS — G47.09 OTHER INSOMNIA: ICD-10-CM

## 2024-06-18 LAB
ALBUMIN SERPL BCP-MCNC: 3.8 G/DL (ref 3.5–5.2)
ALP SERPL-CCNC: 50 U/L (ref 55–135)
ALT SERPL W/O P-5'-P-CCNC: 21 U/L (ref 10–44)
ANION GAP SERPL CALC-SCNC: 11 MMOL/L (ref 8–16)
AST SERPL-CCNC: 25 U/L (ref 10–40)
BILIRUB SERPL-MCNC: 0.4 MG/DL (ref 0.1–1)
BUN SERPL-MCNC: 10 MG/DL (ref 6–20)
CALCIUM SERPL-MCNC: 9.3 MG/DL (ref 8.7–10.5)
CHLORIDE SERPL-SCNC: 105 MMOL/L (ref 95–110)
CO2 SERPL-SCNC: 23 MMOL/L (ref 23–29)
CREAT SERPL-MCNC: 0.8 MG/DL (ref 0.5–1.4)
ERYTHROCYTE [DISTWIDTH] IN BLOOD BY AUTOMATED COUNT: 13.3 % (ref 11.5–14.5)
EST. GFR  (NO RACE VARIABLE): >60 ML/MIN/1.73 M^2
ESTRADIOL SERPL-MCNC: 106 PG/ML
FERRITIN SERPL-MCNC: 19 NG/ML (ref 20–300)
GLUCOSE SERPL-MCNC: 84 MG/DL (ref 70–110)
HCT VFR BLD AUTO: 41.7 % (ref 37–48.5)
HGB BLD-MCNC: 13.9 G/DL (ref 12–16)
IRON SERPL-MCNC: 72 UG/DL (ref 30–160)
MCH RBC QN AUTO: 32.3 PG (ref 27–31)
MCHC RBC AUTO-ENTMCNC: 33.3 G/DL (ref 32–36)
MCV RBC AUTO: 97 FL (ref 82–98)
PLATELET # BLD AUTO: 271 K/UL (ref 150–450)
PMV BLD AUTO: 11.3 FL (ref 9.2–12.9)
POTASSIUM SERPL-SCNC: 3.8 MMOL/L (ref 3.5–5.1)
PROT SERPL-MCNC: 7.3 G/DL (ref 6–8.4)
PTH-INTACT SERPL-MCNC: 62.2 PG/ML (ref 9–77)
RBC # BLD AUTO: 4.3 M/UL (ref 4–5.4)
SATURATED IRON: 13 % (ref 20–50)
SODIUM SERPL-SCNC: 139 MMOL/L (ref 136–145)
TOTAL IRON BINDING CAPACITY: 537 UG/DL (ref 250–450)
TRANSFERRIN SERPL-MCNC: 363 MG/DL (ref 200–375)
TSH SERPL DL<=0.005 MIU/L-ACNC: 1.69 UIU/ML (ref 0.4–4)
VIT B12 SERPL-MCNC: 333 PG/ML (ref 210–950)
WBC # BLD AUTO: 6.47 K/UL (ref 3.9–12.7)

## 2024-06-18 PROCEDURE — 3008F BODY MASS INDEX DOCD: CPT | Mod: CPTII,S$GLB,, | Performed by: STUDENT IN AN ORGANIZED HEALTH CARE EDUCATION/TRAINING PROGRAM

## 2024-06-18 PROCEDURE — 3078F DIAST BP <80 MM HG: CPT | Mod: CPTII,S$GLB,, | Performed by: STUDENT IN AN ORGANIZED HEALTH CARE EDUCATION/TRAINING PROGRAM

## 2024-06-18 PROCEDURE — 82670 ASSAY OF TOTAL ESTRADIOL: CPT | Performed by: OBSTETRICS & GYNECOLOGY

## 2024-06-18 PROCEDURE — 99396 PREV VISIT EST AGE 40-64: CPT | Mod: S$GLB,,, | Performed by: STUDENT IN AN ORGANIZED HEALTH CARE EDUCATION/TRAINING PROGRAM

## 2024-06-18 PROCEDURE — 99999 PR PBB SHADOW E&M-EST. PATIENT-LVL III: CPT | Mod: PBBFAC,,, | Performed by: STUDENT IN AN ORGANIZED HEALTH CARE EDUCATION/TRAINING PROGRAM

## 2024-06-18 PROCEDURE — 84590 ASSAY OF VITAMIN A: CPT | Performed by: STUDENT IN AN ORGANIZED HEALTH CARE EDUCATION/TRAINING PROGRAM

## 2024-06-18 PROCEDURE — 80053 COMPREHEN METABOLIC PANEL: CPT | Mod: PO | Performed by: STUDENT IN AN ORGANIZED HEALTH CARE EDUCATION/TRAINING PROGRAM

## 2024-06-18 PROCEDURE — 83970 ASSAY OF PARATHORMONE: CPT | Performed by: STUDENT IN AN ORGANIZED HEALTH CARE EDUCATION/TRAINING PROGRAM

## 2024-06-18 PROCEDURE — 85027 COMPLETE CBC AUTOMATED: CPT | Mod: PO | Performed by: STUDENT IN AN ORGANIZED HEALTH CARE EDUCATION/TRAINING PROGRAM

## 2024-06-18 PROCEDURE — 82728 ASSAY OF FERRITIN: CPT | Performed by: STUDENT IN AN ORGANIZED HEALTH CARE EDUCATION/TRAINING PROGRAM

## 2024-06-18 PROCEDURE — 82306 VITAMIN D 25 HYDROXY: CPT | Performed by: STUDENT IN AN ORGANIZED HEALTH CARE EDUCATION/TRAINING PROGRAM

## 2024-06-18 PROCEDURE — 82607 VITAMIN B-12: CPT | Performed by: STUDENT IN AN ORGANIZED HEALTH CARE EDUCATION/TRAINING PROGRAM

## 2024-06-18 PROCEDURE — 84238 ASSAY NONENDOCRINE RECEPTOR: CPT | Performed by: STUDENT IN AN ORGANIZED HEALTH CARE EDUCATION/TRAINING PROGRAM

## 2024-06-18 PROCEDURE — 83540 ASSAY OF IRON: CPT | Performed by: STUDENT IN AN ORGANIZED HEALTH CARE EDUCATION/TRAINING PROGRAM

## 2024-06-18 PROCEDURE — 1159F MED LIST DOCD IN RCRD: CPT | Mod: CPTII,S$GLB,, | Performed by: STUDENT IN AN ORGANIZED HEALTH CARE EDUCATION/TRAINING PROGRAM

## 2024-06-18 PROCEDURE — 84443 ASSAY THYROID STIM HORMONE: CPT | Performed by: STUDENT IN AN ORGANIZED HEALTH CARE EDUCATION/TRAINING PROGRAM

## 2024-06-18 PROCEDURE — 3074F SYST BP LT 130 MM HG: CPT | Mod: CPTII,S$GLB,, | Performed by: STUDENT IN AN ORGANIZED HEALTH CARE EDUCATION/TRAINING PROGRAM

## 2024-06-18 RX ORDER — BUPROPION HYDROCHLORIDE 75 MG/1
TABLET ORAL
Qty: 63 TABLET | Refills: 0 | Status: SHIPPED | OUTPATIENT
Start: 2024-06-18 | End: 2024-07-22

## 2024-06-18 NOTE — ASSESSMENT & PLAN NOTE
Patient will occasionally have nights when she wakes up due to anxiety and has trouble going to back to sleep. However, many nights she will only have to get up once to let out her dog and can go back to sleep. Even after these nights, she won't feel refreshed upon waking. Workup as listed.

## 2024-06-18 NOTE — ASSESSMENT & PLAN NOTE
Variably controlled. Mostly work related. After discussing anxiety, focus, and weight, we have decided to try Wellbutrin. Of note, patient was previously on Effexor which she did not tolerate.

## 2024-06-18 NOTE — PROGRESS NOTES
Name: Brenna Soni  MRN: [id number]  : 1975    Subjective   HPI  Patient presents for annual visit.    Review of Systems   Constitutional:  Positive for fatigue (does not wake up feeling rested; feels as if she could sleep during the day).        Patient Active Problem List   Diagnosis    Acquired hypothyroidism    TBI (traumatic brain injury)    Localized adiposity    Chronic bilateral low back pain without sciatica    Lumbar spondylosis    Myofascial pain syndrome    Jersey finger, initial encounter    Finger mass, left    Hormone replacement therapy (HRT)    Chronic fatigue    Other insomnia    Anxiety    S/P gastric bypass       Current Outpatient Medications on File Prior to Visit   Medication Sig Dispense Refill    EPINEPHrine (EPIPEN) 0.3 mg/0.3 mL AtIn SMARTSI Pre-Filled Pen Syringe IM Once PRN      ibuprofen (ADVIL,MOTRIN) 800 MG tablet Take 1 tablet (800 mg total) by mouth every 8 (eight) hours as needed for Pain. 20 tablet 0    levothyroxine (SYNTHROID) 100 MCG tablet Take 1 tablet (100 mcg total) by mouth once daily. 90 tablet 2    progesterone (PROMETRIUM) 100 MG capsule Take 1 capsule (100 mg total) by mouth nightly. 90 capsule 3    progesterone (PROMETRIUM) 200 MG capsule Take 1 capsule (200 mg total) by mouth nightly. 90 capsule 3    [DISCONTINUED] semaglutide (OZEMPIC) 0.25 mg or 0.5 mg(2 mg/1.5 mL) pen injector Inject 0.25 mg into the skin every Monday. (Patient not taking: Reported on 2024)       Current Facility-Administered Medications on File Prior to Visit   Medication Dose Route Frequency Provider Last Rate Last Admin    estradiol valerate (DELESTROGEN) injection 20 mg/mL  30 mg Intramuscular Q30 Days Nina Edwards MD   30 mg at 24 1309       Past Medical History:   Diagnosis Date    Abnormal Pap smear of cervix     cryo    Anal fissure     Hypothyroid 2013    Postmenopausal bleeding 2022    Rectal bleeding     TBI (traumatic brain injury)  2012    concussion, CT done.      Vertigo     Water retention        Past Surgical History:   Procedure Laterality Date    ABDOMINOPLASTY  2015    AUGMENTATION OF BREAST Bilateral 2012    Lift also     SECTION      COLONOSCOPY N/A 2022    Procedure: COLONOSCOPY;  Surgeon: Faith Valerio MD;  Location: [facility];  Service: Endoscopy;  Laterality: N/A;    ENDOMETRIAL ABLATION      HYSTEROSCOPY WITH DILATION AND CURETTAGE OF UTERUS N/A 2022    Procedure: HYSTEROSCOPY, WITH DILATION AND CURETTAGE OF UTERUS;  Surgeon: Nina Edwards MD;  Location: [facility];  Service: OB/GYN;  Laterality: N/A;    LAPAROSCOPY      X2 endometriosis    SLEEVE GASTROPLASTY          Family History   Adopted: Yes   Problem Relation Name Age of Onset    Heart attack Other          Maternal side    Stroke Other          Maternal side       Social History     Socioeconomic History    Marital status:    Occupational History    Occupation:      Employer: [employer]   Tobacco Use    Smoking status: Never    Smokeless tobacco: Never   Substance and Sexual Activity    Alcohol use: Yes     Alcohol/week: 1.0 standard drink of alcohol     Types: 1 Glasses of wine per week     Comment: 2-3 glasses of wine/month    Drug use: Never    Sexual activity: Not Currently     Partners: Male     Birth control/protection: None   Social History Narrative    Lives with  and step son. One dog. Enjoys reading, exercising, playing piano, writing poems, travel.     Social Determinants of Health     Financial Resource Strain: Low Risk  (2023)    Overall Financial Resource Strain (CARDIA)     Difficulty of Paying Living Expenses: Not hard at all   Food Insecurity: No Food Insecurity (2023)    Hunger Vital Sign     Worried About Running Out of Food in the Last Year: Never true     Ran Out of Food in the Last Year: Never true   Transportation Needs: No Transportation Needs  (6/25/2023)    PRAPARE - Transportation     Lack of Transportation (Medical): No     Lack of Transportation (Non-Medical): No   Physical Activity: Insufficiently Active (6/25/2023)    Exercise Vital Sign     Days of Exercise per Week: 2 days     Minutes of Exercise per Session: 30 min   Stress: Stress Concern Present (6/25/2023)    [employer] [employer] - Occupational Stress Questionnaire     Feeling of Stress : To some extent   Housing Stability: Low Risk  (6/25/2023)    Housing Stability Vital Sign     Unable to Pay for Housing in the Last Year: No     Number of Places Lived in the Last Year: 1     Unstable Housing in the Last Year: No       Review of patient's allergies indicates:  No Known Allergies     Health Maintenance Due   Topic Date Due    HIV Screening  Never done    COVID-19 Vaccine (4 - 2023-24 season) 09/01/2023    Shingles Vaccine (1 of 2) Never done       Objective   Vitals:    06/18/24 1031   BP: 112/74   Pulse: 68        Physical Exam  Constitutional:       General: She is not in acute distress.     Appearance: Normal appearance. She is well-developed.   HENT:      Head: Normocephalic and atraumatic.      Right Ear: External ear normal.      Left Ear: External ear normal.   Eyes:      Conjunctiva/sclera: Conjunctivae normal.   Pulmonary:      Effort: Pulmonary effort is normal. No respiratory distress.   Abdominal:      General: Abdomen is flat. There is no distension.   Musculoskeletal:         General: No swelling or deformity.      Right lower leg: No edema.      Left lower leg: No edema.   Skin:     General: Skin is warm and dry.      Coloration: Skin is not jaundiced.   Neurological:      Mental Status: She is alert and oriented to person, place, and time. Mental status is at baseline.   Psychiatric:         Attention and Perception: Attention and perception normal.         Mood and Affect: Mood normal.         Speech: Speech normal.         Behavior: Behavior normal.  "Behavior is cooperative.         Thought Content: Thought content normal.         Cognition and Memory: Cognition normal.         Judgment: Judgment normal.          Assessment & Plan   1. S/P gastric bypass  Assessment & Plan:  Patient has gained weight recently but BMI is still well controlled.    Orders:  -     CBC Without Differential; Future; Expected date: 06/18/2024  -     Comprehensive Metabolic Panel; Future; Expected date: 06/18/2024  -     PTH, Intact; Future; Expected date: 06/18/2024  -     Vitamin A; Future; Expected date: 06/18/2024  -     Vitamin B12; Future; Expected date: 06/18/2024  -     Iron and TIBC; Future; Expected date: 06/18/2024  -     Ferritin; Future; Expected date: 06/18/2024  -     Soluble Transferrin Receptor; Future; Expected date: 06/18/2024  -     Misc Sendout Test, Blood 25-hydroxy vitamin D; Future; Expected date: 06/18/2024    2. Acquired hypothyroidism  -     TSH; Future; Expected date: 06/18/2024    3. Daytime somnolence  -     Home Sleep Study; Future    4. Chronic fatigue  Assessment & Plan:  Patient continues to have chronic fatigue. Mentions "crashing" around 1 PM daily, even on nights when she has good sleep. Will evaluate for sleep apnea and additional listed labs.      5. Other insomnia  Assessment & Plan:  Patient will occasionally have nights when she wakes up due to anxiety and has trouble going to back to sleep. However, many nights she will only have to get up once to let out her dog and can go back to sleep. Even after these nights, she won't feel refreshed upon waking. Workup as listed.      6. Anxiety  Assessment & Plan:  Variably controlled. Mostly work related. After discussing anxiety, focus, and weight, we have decided to try Wellbutrin. Of note, patient was previously on Effexor which she did not tolerate.     Orders:  -     buPROPion (WELLBUTRIN) 75 MG tablet; Take 1 tablet (75 mg total) by mouth once daily for 7 days, THEN 1 tablet (75 mg total) 2 (two) " times daily.  Dispense: 63 tablet; Refill: 0        Follow up in 4 months.     Brian Sanchez MD  06/18/2024

## 2024-06-18 NOTE — ASSESSMENT & PLAN NOTE
"Patient continues to have chronic fatigue. Mentions "crashing" around 1 PM daily, even on nights when she has good sleep. Will evaluate for sleep apnea and additional listed labs.  "

## 2024-06-19 LAB — 25(OH)D3+25(OH)D2 SERPL-MCNC: 25 NG/ML (ref 30–96)

## 2024-06-20 LAB — STFR SERPL-MCNC: 3.6 MG/L (ref 1.8–4.6)

## 2024-06-24 LAB — VIT A SERPL-MCNC: 50 UG/DL (ref 38–106)

## 2024-06-27 ENCOUNTER — CLINICAL SUPPORT (OUTPATIENT)
Dept: OBSTETRICS AND GYNECOLOGY | Facility: CLINIC | Age: 49
End: 2024-06-27
Payer: COMMERCIAL

## 2024-06-27 DIAGNOSIS — Z79.890 HORMONE REPLACEMENT THERAPY (HRT): Primary | ICD-10-CM

## 2024-06-27 PROCEDURE — 96372 THER/PROPH/DIAG INJ SC/IM: CPT | Mod: S$GLB,,, | Performed by: OBSTETRICS & GYNECOLOGY

## 2024-06-27 RX ADMIN — ESTRADIOL VALERATE 30 MG: 20 INJECTION INTRAMUSCULAR at 01:06

## 2024-06-27 NOTE — PROGRESS NOTES
2 pt identifiers verified. Pt tolerated 30mg of delestrogen well. Instrujcted to return in 4 weeks for next injecion

## 2024-07-19 DIAGNOSIS — F41.9 ANXIETY: ICD-10-CM

## 2024-07-19 RX ORDER — BUPROPION HYDROCHLORIDE 75 MG/1
75 TABLET ORAL 2 TIMES DAILY
Qty: 60 TABLET | Refills: 11 | Status: SHIPPED | OUTPATIENT
Start: 2024-07-19

## 2024-07-19 NOTE — TELEPHONE ENCOUNTER
No care due was identified.  [facility] Embedded Care Due Messages. Reference number: [id number].   7/19/2024 1:25:45 PM CDT

## 2024-07-19 NOTE — TELEPHONE ENCOUNTER
Refill Routing Note   Medication(s) are not appropriate for processing by [facility] for the following reason(s):        New or recently adjusted medication    [facility] action(s):  Defer               Appointments  past 12m or future 3m with PCP    Date Provider   Last Visit   6/18/2024 Brian Sanchez MD   Next Visit   11/6/2024 Brian Sanchez MD   ED visits in past 90 days: 0        Note composed:2:01 PM 07/19/2024

## 2024-07-24 ENCOUNTER — TELEPHONE (OUTPATIENT)
Dept: OBSTETRICS AND GYNECOLOGY | Facility: CLINIC | Age: 49
End: 2024-07-24
Payer: COMMERCIAL

## 2024-07-24 NOTE — TELEPHONE ENCOUNTER
----- Message from Odell Hill sent at 7/24/2024  9:59 AM CDT -----  Regarding: appt  Contact: BRENNA NAZARIO [[id number]]  Type:  Sooner Appointment Request    Caller is requesting a sooner appointment.      Name of Caller:  Brenna    When is the first available appointment?  Dept book    Symptoms:  NV-Injection    Would the patient rather a call back or a response via [facility]? Call    Best Call Back Number:  [phone]     Additional Information:  Needs to RS today's injection. Please call to advise.

## 2024-07-30 ENCOUNTER — CLINICAL SUPPORT (OUTPATIENT)
Dept: OBSTETRICS AND GYNECOLOGY | Facility: CLINIC | Age: 49
End: 2024-07-30
Payer: COMMERCIAL

## 2024-07-30 DIAGNOSIS — Z79.890 HORMONE REPLACEMENT THERAPY (HRT): Primary | ICD-10-CM

## 2024-07-30 PROCEDURE — 96372 THER/PROPH/DIAG INJ SC/IM: CPT | Mod: S$GLB,,, | Performed by: OBSTETRICS & GYNECOLOGY

## 2024-07-30 PROCEDURE — 99999 PR PBB SHADOW E&M-EST. PATIENT-LVL I: CPT | Mod: PBBFAC,,,

## 2024-07-30 RX ORDER — ESTRADIOL VALERATE 20 MG/ML
30 INJECTION INTRAMUSCULAR
Status: COMPLETED | OUTPATIENT
Start: 2024-07-30 | End: 2024-07-30

## 2024-07-30 RX ADMIN — ESTRADIOL VALERATE 30 MG: 20 INJECTION INTRAMUSCULAR at 01:07

## 2024-07-30 NOTE — PROGRESS NOTES
Pt in for HRT injection which was given as ordered.  30 mg IM to LUOQ Glut.  Pt tolerated well and verbalized understanding of when to come back for next dose.

## 2024-08-27 ENCOUNTER — PATIENT MESSAGE (OUTPATIENT)
Dept: FAMILY MEDICINE | Facility: CLINIC | Age: 49
End: 2024-08-27
Payer: COMMERCIAL

## 2024-08-27 ENCOUNTER — CLINICAL SUPPORT (OUTPATIENT)
Dept: OBSTETRICS AND GYNECOLOGY | Facility: CLINIC | Age: 49
End: 2024-08-27
Payer: COMMERCIAL

## 2024-08-27 DIAGNOSIS — Z79.890 HORMONE REPLACEMENT THERAPY (HRT): Primary | ICD-10-CM

## 2024-08-27 PROCEDURE — 96372 THER/PROPH/DIAG INJ SC/IM: CPT | Mod: S$GLB,,, | Performed by: OBSTETRICS & GYNECOLOGY

## 2024-08-27 RX ADMIN — ESTRADIOL VALERATE 30 MG: 20 INJECTION INTRAMUSCULAR at 01:08

## 2024-08-27 NOTE — PROGRESS NOTES
Two patient identifiers verified. Patient tolerated delestrogen  injection well. Advised to return in 4 weeks for next injection.

## 2024-08-28 DIAGNOSIS — E03.9 ACQUIRED HYPOTHYROIDISM: ICD-10-CM

## 2024-08-28 RX ORDER — LEVOTHYROXINE SODIUM 100 UG/1
100 TABLET ORAL
Qty: 90 TABLET | Refills: 1 | Status: SHIPPED | OUTPATIENT
Start: 2024-08-28

## 2024-08-28 NOTE — TELEPHONE ENCOUNTER
Refill Decision Note   Brenna Nae  is requesting a refill authorization.  Brief Assessment and Rationale for Refill:  Approve     Medication Therapy Plan:         Comments:     Note composed:12:49 PM 08/28/2024

## 2024-09-25 ENCOUNTER — CLINICAL SUPPORT (OUTPATIENT)
Dept: OBSTETRICS AND GYNECOLOGY | Facility: CLINIC | Age: 49
End: 2024-09-25
Payer: COMMERCIAL

## 2024-09-25 DIAGNOSIS — Z79.890 HORMONE REPLACEMENT THERAPY (HRT): Primary | ICD-10-CM

## 2024-09-25 PROCEDURE — 96372 THER/PROPH/DIAG INJ SC/IM: CPT | Mod: S$GLB,,, | Performed by: OBSTETRICS & GYNECOLOGY

## 2024-09-25 RX ADMIN — ESTRADIOL VALERATE 30 MG: 20 INJECTION INTRAMUSCULAR at 11:09

## 2024-09-25 NOTE — PROGRESS NOTES
Two patient identifiers verified. Patient tolerated delestrogen injection well. Advised to return in 4 wseeks  for next injection.

## 2024-10-23 ENCOUNTER — CLINICAL SUPPORT (OUTPATIENT)
Dept: OBSTETRICS AND GYNECOLOGY | Facility: CLINIC | Age: 49
End: 2024-10-23
Payer: COMMERCIAL

## 2024-10-23 DIAGNOSIS — R23.2 HOT FLASHES: Primary | ICD-10-CM

## 2024-10-23 PROCEDURE — 96372 THER/PROPH/DIAG INJ SC/IM: CPT | Mod: S$GLB,,, | Performed by: OBSTETRICS & GYNECOLOGY

## 2024-10-23 RX ADMIN — ESTRADIOL VALERATE 30 MG: 20 INJECTION INTRAMUSCULAR at 08:10

## 2024-11-06 ENCOUNTER — OFFICE VISIT (OUTPATIENT)
Dept: FAMILY MEDICINE | Facility: CLINIC | Age: 49
End: 2024-11-06
Payer: COMMERCIAL

## 2024-11-06 VITALS
BODY MASS INDEX: 23.8 KG/M2 | HEART RATE: 71 BPM | WEIGHT: 138.69 LBS | DIASTOLIC BLOOD PRESSURE: 70 MMHG | OXYGEN SATURATION: 97 % | SYSTOLIC BLOOD PRESSURE: 116 MMHG

## 2024-11-06 DIAGNOSIS — R53.82 CHRONIC FATIGUE: ICD-10-CM

## 2024-11-06 DIAGNOSIS — F41.9 ANXIETY: ICD-10-CM

## 2024-11-06 DIAGNOSIS — Z00.00 HEALTHCARE MAINTENANCE: Primary | ICD-10-CM

## 2024-11-06 DIAGNOSIS — M79.605 LEFT LEG PAIN: ICD-10-CM

## 2024-11-06 PROCEDURE — 99214 OFFICE O/P EST MOD 30 MIN: CPT | Mod: 25,S$GLB,, | Performed by: STUDENT IN AN ORGANIZED HEALTH CARE EDUCATION/TRAINING PROGRAM

## 2024-11-06 PROCEDURE — 90656 IIV3 VACC NO PRSV 0.5 ML IM: CPT | Mod: S$GLB,,, | Performed by: STUDENT IN AN ORGANIZED HEALTH CARE EDUCATION/TRAINING PROGRAM

## 2024-11-06 PROCEDURE — 3078F DIAST BP <80 MM HG: CPT | Mod: CPTII,S$GLB,, | Performed by: STUDENT IN AN ORGANIZED HEALTH CARE EDUCATION/TRAINING PROGRAM

## 2024-11-06 PROCEDURE — 3074F SYST BP LT 130 MM HG: CPT | Mod: CPTII,S$GLB,, | Performed by: STUDENT IN AN ORGANIZED HEALTH CARE EDUCATION/TRAINING PROGRAM

## 2024-11-06 PROCEDURE — 1159F MED LIST DOCD IN RCRD: CPT | Mod: CPTII,S$GLB,, | Performed by: STUDENT IN AN ORGANIZED HEALTH CARE EDUCATION/TRAINING PROGRAM

## 2024-11-06 PROCEDURE — 90471 IMMUNIZATION ADMIN: CPT | Mod: S$GLB,,, | Performed by: STUDENT IN AN ORGANIZED HEALTH CARE EDUCATION/TRAINING PROGRAM

## 2024-11-06 PROCEDURE — 3008F BODY MASS INDEX DOCD: CPT | Mod: CPTII,S$GLB,, | Performed by: STUDENT IN AN ORGANIZED HEALTH CARE EDUCATION/TRAINING PROGRAM

## 2024-11-06 PROCEDURE — 99999 PR PBB SHADOW E&M-EST. PATIENT-LVL III: CPT | Mod: PBBFAC,,, | Performed by: STUDENT IN AN ORGANIZED HEALTH CARE EDUCATION/TRAINING PROGRAM

## 2024-11-06 RX ORDER — BUPROPION HYDROCHLORIDE 150 MG/1
150 TABLET ORAL DAILY
Qty: 90 TABLET | Refills: 3 | Status: SHIPPED | OUTPATIENT
Start: 2024-11-06 | End: 2025-11-06

## 2024-11-06 NOTE — ASSESSMENT & PLAN NOTE
Patient's anxiety is well controlled on the Wellbutrin. Tolerating well. Currently taking two 75mg short acting tablets a day - convert to 150mg long acting formula.

## 2024-11-06 NOTE — ASSESSMENT & PLAN NOTE
Sleep study showed no sleep apnea. Lab testing didn't show any obvious cause. However, her fatigue has improved since she last saw us. She thinks more regular physical activity may have helped. Continue to monitor.

## 2024-11-06 NOTE — ASSESSMENT & PLAN NOTE
I suspect spasm/injury of the medial gastrocnemius head, although I don't know why she isn't having any pain during the day. She's going to [facility] in a few weeks and wanted to be sure asking so much would make things worse. At this time, I think she is safe to go to [facility]. Imaging as listed.

## 2024-11-06 NOTE — PROGRESS NOTES
Name: Brenna Soni  MRN: [id number]  : 1975  PCP: Brian Sanchez MD    Subjective   Patient presents for follow up of fatigue.    Patient mentions four separate occasions over the past two weeks during which she woke up with drilling type pain in her left inner leg that would radiate down her calf into her foot. Unsure if muscular in nature but when she would get up to go to the bathroom for medication, the pain would hurt worse. Ibuprofen seems to relieve it.     Review of Systems    Patient Active Problem List   Diagnosis    Acquired hypothyroidism    TBI (traumatic brain injury)    Localized adiposity    Chronic bilateral low back pain without sciatica    Lumbar spondylosis    Myofascial pain syndrome    Jersey finger, initial encounter    Finger mass, left    Hormone replacement therapy (HRT)    Chronic fatigue    Other insomnia    Anxiety    S/P gastric bypass    Left leg pain       Health Maintenance Due   Topic Date Due    HIV Screening  Never done    Shingles Vaccine (1 of 2) Never done    COVID-19 Vaccine ( season) 2024       Objective   Vitals:    24 1119   BP: 116/70   Pulse: 71       Physical Exam  Musculoskeletal:      Left lower leg: Tenderness (mild tenderness of shin) present. Edema (questionable - see calf measurements) present.      Comments: Calves measure 37.5cm in circumference about 12cm inferior to the inferior patellar border         Assessment & Plan   1. Healthcare maintenance  -     influenza (Flulaval, Fluzone, Fluarix) 45 mcg/0.5 mL IM vaccine (> or = 6 mo) 0.5 mL    2. Left leg pain  Assessment & Plan:  I suspect spasm/injury of the medial gastrocnemius head, although I don't know why she isn't having any pain during the day. She's going to [facility] in a few weeks and wanted to be sure asking so much would make things worse. At this time, I think she is safe to go to [facility]. Imaging as listed.    Orders:  -     US Extremity Non Vascular  Complete Left; Future; Expected date: 11/06/2024    3. Anxiety  Assessment & Plan:  Patient's anxiety is well controlled on the Wellbutrin. Tolerating well. Currently taking two 75mg short acting tablets a day - convert to 150mg long acting formula.    Orders:  -     buPROPion (WELLBUTRIN XL) 150 MG TB24 tablet; Take 1 tablet (150 mg total) by mouth once daily.  Dispense: 90 tablet; Refill: 3    4. Chronic fatigue  Assessment & Plan:  Sleep study showed no sleep apnea. Lab testing didn't show any obvious cause. However, her fatigue has improved since she last saw us. She thinks more regular physical activity may have helped. Continue to monitor.          Follow up in 6 months.     Brian Sanchez MD  11/06/2024

## 2024-11-13 ENCOUNTER — HOSPITAL ENCOUNTER (OUTPATIENT)
Dept: RADIOLOGY | Facility: HOSPITAL | Age: 49
Discharge: HOME | End: 2024-11-13
Attending: STUDENT IN AN ORGANIZED HEALTH CARE EDUCATION/TRAINING PROGRAM
Payer: COMMERCIAL

## 2024-11-13 DIAGNOSIS — M79.605 LEFT LEG PAIN: ICD-10-CM

## 2024-11-13 PROCEDURE — 76882 US LMTD JT/FCL EVL NVASC XTR: CPT | Mod: TC,PO,LT

## 2024-11-13 PROCEDURE — 76882 US LMTD JT/FCL EVL NVASC XTR: CPT | Mod: 26,LT,, | Performed by: RADIOLOGY

## 2024-11-20 ENCOUNTER — CLINICAL SUPPORT (OUTPATIENT)
Dept: OBSTETRICS AND GYNECOLOGY | Facility: CLINIC | Age: 49
End: 2024-11-20
Payer: COMMERCIAL

## 2024-11-20 DIAGNOSIS — Z79.890 HORMONE REPLACEMENT THERAPY (HRT): Primary | ICD-10-CM

## 2024-11-20 PROCEDURE — 96372 THER/PROPH/DIAG INJ SC/IM: CPT | Mod: S$GLB,,, | Performed by: OBSTETRICS & GYNECOLOGY

## 2024-11-20 PROCEDURE — 99999 PR PBB SHADOW E&M-EST. PATIENT-LVL I: CPT | Mod: PBBFAC,,,

## 2024-11-20 RX ORDER — ESTRADIOL VALERATE 20 MG/ML
30 INJECTION INTRAMUSCULAR
Status: COMPLETED | OUTPATIENT
Start: 2024-11-20 | End: 2024-11-20

## 2024-11-20 RX ADMIN — ESTRADIOL VALERATE 30 MG: 20 INJECTION INTRAMUSCULAR at 02:11

## 2024-11-20 NOTE — PROGRESS NOTES
Pt. In for  HRT injection which was given as ordered, see MAR.  Pt tolerated well and dates given for next dose.

## 2024-12-02 DIAGNOSIS — Z98.890 S/P ENDOMETRIAL ABLATION: ICD-10-CM

## 2024-12-02 DIAGNOSIS — N95.1 PERIMENOPAUSE: ICD-10-CM

## 2024-12-02 DIAGNOSIS — Z79.890 HORMONE REPLACEMENT THERAPY (HRT): ICD-10-CM

## 2024-12-02 DIAGNOSIS — R23.2 HOT FLASHES: ICD-10-CM

## 2024-12-02 DIAGNOSIS — N93.9 VAGINAL BLEEDING: ICD-10-CM

## 2024-12-02 DIAGNOSIS — N92.1 METRORRHAGIA: ICD-10-CM

## 2024-12-02 DIAGNOSIS — E03.9 ACQUIRED HYPOTHYROIDISM: ICD-10-CM

## 2024-12-02 DIAGNOSIS — F41.9 ANXIETY: ICD-10-CM

## 2024-12-02 RX ORDER — PROGESTERONE 100 MG/1
CAPSULE ORAL
Refills: 0 | OUTPATIENT
Start: 2024-12-02

## 2024-12-02 RX ORDER — LEVOTHYROXINE SODIUM 100 UG/1
TABLET ORAL
Refills: 0 | OUTPATIENT
Start: 2024-12-02

## 2024-12-02 RX ORDER — PROGESTERONE 200 MG/1
CAPSULE ORAL
Refills: 0 | OUTPATIENT
Start: 2024-12-02

## 2024-12-02 NOTE — TELEPHONE ENCOUNTER
No care due was identified.  [facility] Embedded Care Due Messages. Reference number: [id number].   12/02/2024 11:50:39 AM CST

## 2024-12-02 NOTE — TELEPHONE ENCOUNTER
Refill Decision Note   Brenna Soni  is requesting a refill authorization.  Brief Assessment and Rationale for Refill:  Quick Discontinue     Medication Therapy Plan: Pharmacy is requesting new scripts for the following medications without required information, (sig/ frequency/qty/etc)   No SIG      Comments: Pharmacies have been requesting medications for patients without required information, (sig, frequency, qty, etc.). In addition, requests are sent for medication(s) pt. are currently not taking, and medications patients have never taken.    We have spoken to the pharmacies about these request types and advised their teams previously that we are unable to assess these New Script requests and require all details for these requests. This is a known issue and has been reported.     Note composed:3:54 PM 12/02/2024

## 2024-12-03 RX ORDER — BUPROPION HYDROCHLORIDE 150 MG/1
TABLET ORAL
Refills: 0 | OUTPATIENT
Start: 2024-12-03

## 2024-12-03 NOTE — TELEPHONE ENCOUNTER
[facility] Note  Quick DC. Inappropriate Request   Refill request requires further review by MD: NO   Medication Therapy Plan: Pharmacy is requesting new script(s) for the following medications without required information, (sig/ frequency/qty/etc)     [facility] action(s):  Quick Discontinue      Duplicate Pended Encounter(s)/ Last Prescribed Details:    Pharmacies have been requesting medications for patients without required information, (sig, frequency, qty, etc.). In addition, requests are sent for medication(s) pt. are currently not taking, and medications patients have never taken.    We have spoken to the pharmacies about these request types and advised their teams previously that we are unable to assess these New Script requests and require all details for these requests. This is a known issue and has been reported.        Medication related problems are not assessed for [facility].   Medication Reconciliation Completed? NO Were there pending details that required adjustment? NO     Automatic Epic Generated Protocol Data Below:   Requested Prescriptions     Pending Prescriptions Disp Refills    buPROPion (WELLBUTRIN XL) 150 MG TB24 tablet [Pharmacy Med Name: BUPROPION HCL XL TABS 150MG]  0              Appointments      Date Provider   Last Visit   11/6/2024 Brian Sanchez MD   Next Visit   5/14/2025 Brian Sanchez MD        Note composed:10:59 AM 12/03/2024

## 2024-12-10 ENCOUNTER — PATIENT MESSAGE (OUTPATIENT)
Dept: OBSTETRICS AND GYNECOLOGY | Facility: CLINIC | Age: 49
End: 2024-12-10
Payer: COMMERCIAL

## 2024-12-10 ENCOUNTER — PATIENT MESSAGE (OUTPATIENT)
Dept: FAMILY MEDICINE | Facility: CLINIC | Age: 49
End: 2024-12-10
Payer: COMMERCIAL

## 2024-12-10 DIAGNOSIS — N93.9 VAGINAL BLEEDING: ICD-10-CM

## 2024-12-10 DIAGNOSIS — R23.2 HOT FLASHES: ICD-10-CM

## 2024-12-10 DIAGNOSIS — N92.1 METRORRHAGIA: ICD-10-CM

## 2024-12-10 DIAGNOSIS — Z98.890 S/P ENDOMETRIAL ABLATION: ICD-10-CM

## 2024-12-10 DIAGNOSIS — Z79.890 HORMONE REPLACEMENT THERAPY (HRT): ICD-10-CM

## 2024-12-10 DIAGNOSIS — N95.1 PERIMENOPAUSE: ICD-10-CM

## 2024-12-10 DIAGNOSIS — E03.9 ACQUIRED HYPOTHYROIDISM: ICD-10-CM

## 2024-12-10 RX ORDER — PROGESTERONE 100 MG/1
100 CAPSULE ORAL NIGHTLY
Qty: 90 CAPSULE | Refills: 3 | Status: SHIPPED | OUTPATIENT
Start: 2024-12-10 | End: 2025-12-10

## 2024-12-10 RX ORDER — LEVOTHYROXINE SODIUM 100 UG/1
100 TABLET ORAL
Qty: 90 TABLET | Refills: 3 | Status: SHIPPED | OUTPATIENT
Start: 2024-12-10

## 2024-12-10 RX ORDER — PROGESTERONE 200 MG/1
200 CAPSULE ORAL NIGHTLY
Qty: 90 CAPSULE | Refills: 3 | Status: SHIPPED | OUTPATIENT
Start: 2024-12-10

## 2024-12-19 ENCOUNTER — CLINICAL SUPPORT (OUTPATIENT)
Dept: OBSTETRICS AND GYNECOLOGY | Facility: CLINIC | Age: 49
End: 2024-12-19
Payer: COMMERCIAL

## 2024-12-19 DIAGNOSIS — Z79.890 HORMONE REPLACEMENT THERAPY (HRT): Primary | ICD-10-CM

## 2024-12-19 PROCEDURE — 96372 THER/PROPH/DIAG INJ SC/IM: CPT | Mod: S$GLB,,, | Performed by: OBSTETRICS & GYNECOLOGY

## 2024-12-19 RX ADMIN — ESTRADIOL VALERATE 30 MG: 20 INJECTION INTRAMUSCULAR at 11:12

## 2024-12-19 NOTE — PROGRESS NOTES
2 patient identifiers verified. Patient tolerated  delestrogen injection well. Instructed to return in 4 weeks for next injection

## 2025-01-12 ENCOUNTER — PATIENT MESSAGE (OUTPATIENT)
Dept: OBSTETRICS AND GYNECOLOGY | Facility: CLINIC | Age: 50
End: 2025-01-12
Payer: COMMERCIAL

## 2025-01-15 ENCOUNTER — CLINICAL SUPPORT (OUTPATIENT)
Dept: OBSTETRICS AND GYNECOLOGY | Facility: CLINIC | Age: 50
End: 2025-01-15
Payer: COMMERCIAL

## 2025-01-15 DIAGNOSIS — Z79.890 HORMONE REPLACEMENT THERAPY (HRT): Primary | ICD-10-CM

## 2025-01-15 PROCEDURE — 96372 THER/PROPH/DIAG INJ SC/IM: CPT | Mod: S$GLB,,, | Performed by: OBSTETRICS & GYNECOLOGY

## 2025-01-15 RX ADMIN — ESTRADIOL VALERATE 30 MG: 20 INJECTION INTRAMUSCULAR at 10:01

## 2025-02-06 ENCOUNTER — PATIENT MESSAGE (OUTPATIENT)
Dept: FAMILY MEDICINE | Facility: CLINIC | Age: 50
End: 2025-02-06
Payer: COMMERCIAL

## 2025-02-06 DIAGNOSIS — F41.9 ANXIETY: ICD-10-CM

## 2025-02-06 RX ORDER — BUPROPION HYDROCHLORIDE 150 MG/1
150 TABLET ORAL DAILY
Qty: 90 TABLET | Refills: 2 | Status: SHIPPED | OUTPATIENT
Start: 2025-02-06 | End: 2025-11-03

## 2025-02-06 NOTE — TELEPHONE ENCOUNTER
No care due was identified.  [facility] Embedded Care Due Messages. Reference number: [id number].   2/06/2025 11:16:41 AM CST

## 2025-02-06 NOTE — TELEPHONE ENCOUNTER
Refill Decision Note   Brenna Nae  is requesting a refill authorization.  Brief Assessment and Rationale for Refill:  Approve     Medication Therapy Plan:         Comments:     Note composed:1:12 PM 02/06/2025

## 2025-02-12 ENCOUNTER — CLINICAL SUPPORT (OUTPATIENT)
Dept: OBSTETRICS AND GYNECOLOGY | Facility: CLINIC | Age: 50
End: 2025-02-12
Payer: COMMERCIAL

## 2025-02-12 DIAGNOSIS — Z79.890 HORMONE REPLACEMENT THERAPY (HRT): Primary | ICD-10-CM

## 2025-02-12 PROCEDURE — 96372 THER/PROPH/DIAG INJ SC/IM: CPT | Mod: S$GLB,,, | Performed by: OBSTETRICS & GYNECOLOGY

## 2025-02-12 RX ADMIN — ESTRADIOL VALERATE 30 MG: 20 INJECTION INTRAMUSCULAR at 09:02

## 2025-02-12 NOTE — PROGRESS NOTES
2 patient identifiers verified. Patient tolerated delestrogen injection well. Instructed to return in for 4 weeks next injection  advised wwe due after 2/28/25.

## 2025-03-07 DIAGNOSIS — E03.9 ACQUIRED HYPOTHYROIDISM: ICD-10-CM

## 2025-03-07 RX ORDER — LEVOTHYROXINE SODIUM 100 UG/1
TABLET ORAL
Qty: 90 TABLET | Refills: 3 | OUTPATIENT
Start: 2025-03-07

## 2025-03-07 NOTE — TELEPHONE ENCOUNTER
Refill Decision Note   Brenna Soni  is requesting a refill authorization.  Brief Assessment and Rationale for Refill:  Quick Discontinue     Medication Therapy Plan:  Receipt confirmed by pharmacy (12/10/2024  2:24 PM CST)      Comments:     Note composed:5:56 AM 03/07/2025

## 2025-03-12 ENCOUNTER — CLINICAL SUPPORT (OUTPATIENT)
Dept: OBSTETRICS AND GYNECOLOGY | Facility: CLINIC | Age: 50
End: 2025-03-12
Payer: COMMERCIAL

## 2025-03-12 DIAGNOSIS — Z79.890 HORMONE REPLACEMENT THERAPY (HRT): Primary | ICD-10-CM

## 2025-03-12 PROCEDURE — 96372 THER/PROPH/DIAG INJ SC/IM: CPT | Mod: S$GLB,,, | Performed by: OBSTETRICS & GYNECOLOGY

## 2025-03-12 RX ADMIN — ESTRADIOL VALERATE 30 MG: 20 INJECTION INTRAMUSCULAR at 09:03

## 2025-03-18 ENCOUNTER — HOSPITAL ENCOUNTER (OUTPATIENT)
Dept: RADIOLOGY | Facility: HOSPITAL | Age: 50
Discharge: HOME | End: 2025-03-18
Attending: OBSTETRICS & GYNECOLOGY
Payer: COMMERCIAL

## 2025-03-18 ENCOUNTER — OFFICE VISIT (OUTPATIENT)
Dept: OBSTETRICS AND GYNECOLOGY | Facility: CLINIC | Age: 50
End: 2025-03-18
Payer: COMMERCIAL

## 2025-03-18 ENCOUNTER — RESULTS FOLLOW-UP (OUTPATIENT)
Dept: OBSTETRICS AND GYNECOLOGY | Facility: CLINIC | Age: 50
End: 2025-03-18

## 2025-03-18 VITALS
DIASTOLIC BLOOD PRESSURE: 64 MMHG | HEIGHT: 64 IN | SYSTOLIC BLOOD PRESSURE: 102 MMHG | WEIGHT: 135.13 LBS | BODY MASS INDEX: 23.07 KG/M2

## 2025-03-18 DIAGNOSIS — E03.9 ACQUIRED HYPOTHYROIDISM: ICD-10-CM

## 2025-03-18 DIAGNOSIS — N93.9 ABNORMAL UTERINE BLEEDING (AUB): ICD-10-CM

## 2025-03-18 DIAGNOSIS — Z79.890 HORMONE REPLACEMENT THERAPY (HRT): ICD-10-CM

## 2025-03-18 DIAGNOSIS — N95.1 PERIMENOPAUSE: ICD-10-CM

## 2025-03-18 DIAGNOSIS — Z98.890 S/P ENDOMETRIAL ABLATION: ICD-10-CM

## 2025-03-18 DIAGNOSIS — Z01.419 ROUTINE GYNECOLOGICAL EXAMINATION: Primary | ICD-10-CM

## 2025-03-18 DIAGNOSIS — Z12.31 ENCOUNTER FOR SCREENING MAMMOGRAM FOR MALIGNANT NEOPLASM OF BREAST: ICD-10-CM

## 2025-03-18 DIAGNOSIS — Z98.84 S/P GASTRIC BYPASS: ICD-10-CM

## 2025-03-18 PROCEDURE — 76830 TRANSVAGINAL US NON-OB: CPT | Mod: TC,PN

## 2025-03-18 PROCEDURE — 76830 TRANSVAGINAL US NON-OB: CPT | Mod: 26,,, | Performed by: RADIOLOGY

## 2025-03-18 PROCEDURE — 3074F SYST BP LT 130 MM HG: CPT | Mod: CPTII,S$GLB,, | Performed by: OBSTETRICS & GYNECOLOGY

## 2025-03-18 PROCEDURE — 3078F DIAST BP <80 MM HG: CPT | Mod: CPTII,S$GLB,, | Performed by: OBSTETRICS & GYNECOLOGY

## 2025-03-18 PROCEDURE — 99999 PR PBB SHADOW E&M-EST. PATIENT-LVL III: CPT | Mod: PBBFAC,,, | Performed by: OBSTETRICS & GYNECOLOGY

## 2025-03-18 PROCEDURE — 76856 US EXAM PELVIC COMPLETE: CPT | Mod: 26,,, | Performed by: RADIOLOGY

## 2025-03-18 PROCEDURE — 99396 PREV VISIT EST AGE 40-64: CPT | Mod: S$GLB,,, | Performed by: OBSTETRICS & GYNECOLOGY

## 2025-03-18 PROCEDURE — 1159F MED LIST DOCD IN RCRD: CPT | Mod: CPTII,S$GLB,, | Performed by: OBSTETRICS & GYNECOLOGY

## 2025-03-18 PROCEDURE — 3008F BODY MASS INDEX DOCD: CPT | Mod: CPTII,S$GLB,, | Performed by: OBSTETRICS & GYNECOLOGY

## 2025-03-18 RX ORDER — LEVOTHYROXINE SODIUM 100 UG/1
100 TABLET ORAL
Qty: 90 TABLET | Refills: 3 | Status: SHIPPED | OUTPATIENT
Start: 2025-03-18

## 2025-03-18 RX ORDER — MEDROXYPROGESTERONE ACETATE 10 MG/1
10 TABLET ORAL DAILY
Qty: 90 TABLET | Refills: 3 | Status: SHIPPED | OUTPATIENT
Start: 2025-03-18 | End: 2026-03-18

## 2025-03-18 NOTE — PROGRESS NOTES
Chief Complaint   Patient presents with    Well Woman       History of Present Illness: Brenna Soni is a 49 y.o. female that presents today 3/18/2025 with Patient's last menstrual period was 2021.  for well gyn visit.    She reports regular light dark predictable bleeding.         Past Medical History:   Diagnosis Date    Abnormal Pap smear of cervix     cryo    Anal fissure     Hypothyroid 2013    Postmenopausal bleeding 2022    Rectal bleeding     TBI (traumatic brain injury)     concussion, CT done.      Vertigo     Water retention        Past Surgical History:   Procedure Laterality Date    ABDOMINOPLASTY  2015    AUGMENTATION OF BREAST Bilateral     Lift also     SECTION      COLONOSCOPY N/A 2022    Procedure: COLONOSCOPY;  Surgeon: Faith Valerio MD;  Location: [facility];  Service: Endoscopy;  Laterality: N/A;    ENDOMETRIAL ABLATION      HYSTEROSCOPY WITH DILATION AND CURETTAGE OF UTERUS N/A 2022    Procedure: HYSTEROSCOPY, WITH DILATION AND CURETTAGE OF UTERUS;  Surgeon: Nina Edwards MD;  [facility];  Service: OB/GYN;  Laterality: N/A;    LAPAROSCOPY      X2 endometriosis    SLEEVE GASTROPLASTY         Medications Prior to Visit[1]    Review of patient's allergies indicates:  No Known Allergies    Family History   Adopted: Yes   Problem Relation Name Age of Onset    Heart attack Other          Maternal side    Stroke Other          Maternal side       Social History     Socioeconomic History    Marital status:    Occupational History    Occupation:      Employer: [employer]   Tobacco Use    Smoking status: Never    Smokeless tobacco: Never   Substance and Sexual Activity    Alcohol use: Yes     Alcohol/week: 1.0 standard drink of alcohol     Types: 1 Glasses of wine per week     Comment: 2-3 glasses of wine/month    Drug use: Never    Sexual activity: Not Currently      Partners: Male     Birth control/protection: None   Social History Narrative    Lives with  and step son. One dog. Enjoys reading, exercising, playing piano, writing poems, travel.     Social Drivers of Health     Financial Resource Strain: Low Risk  (2024)    Overall Financial Resource Strain (CARDIA)     Difficulty of Paying Living Expenses: Not hard at all   Food Insecurity: No Food Insecurity (2024)    Hunger Vital Sign     Worried About Running Out of Food in the Last Year: Never true     Ran Out of Food in the Last Year: Never true   Transportation Needs: No Transportation Needs (2023)    PRAPARE - Transportation     Lack of Transportation (Medical): No     Lack of Transportation (Non-Medical): No   Physical Activity: Insufficiently Active (2024)    Exercise Vital Sign     Days of Exercise per Week: 4 days     Minutes of Exercise per Session: 30 min   Stress: Stress Concern Present (2024)    [place] [employer] - Occupational Stress Questionnaire     Feeling of Stress : Rather much   Housing Stability: Low Risk  (2023)    Housing Stability Vital Sign     Unable to Pay for Housing in the Last Year: No     Number of Places Lived in the Last Year: 1     Unstable Housing in the Last Year: No       OB History    Para Term  AB Living   1 1 1      SAB IAB Ectopic Multiple Live Births             # Outcome Date GA Lbr Dmitriy/2nd Weight Sex Type Anes PTL Lv   1 Term                Review of Symptoms:  GENERAL: Denies weight gain or weight loss. Feeling well overall.   SKIN: Denies rash or lesions.   HEAD: Denies head injury or headache.   NODES: Denies enlarged lymph nodes.   CHEST: Denies chest pain or shortness of breath.   CARDIOVASCULAR: Denies palpitations or left sided chest pain.   ABDOMEN: No abdominal pain, constipation, diarrhea, nausea, vomiting or rectal bleeding.   URINARY: No frequency, dysuria, hematuria, or burning on  "urination.  HEMATOLOGIC: No easy bruisability or excessive bleeding.   MUSCULOSKELETAL: Denies joint pain or swelling.     /64   Ht 5' 4" (1.626 m)   Wt 61.3 kg (135 lb 2.3 oz)   LMP 02/02/2021   Physical Exam:  APPEARANCE: Well nourished, well developed, in no acute distress.  SKIN: Normal skin turgor, no lesions.  NECK: Neck symmetric without masses   RESPIRATORY: Normal respiratory effort with no retractions or use of accessory muscles  CARDIOVASCULAR: Peripheral vascular system with no swelling no varicosities and palpation of pulses normal  LYMPHATIC: No enlargements of the lymph nodes noted in the neck, axillae, or groin  ABDOMEN: Soft. No tenderness or masses. No hepatosplenomegaly. No hernias.  BREASTS: Symmetrical, no skin changes or visible lesions. No palpable masses, nipple discharge or adenopathy bilaterally.  PELVIC: Normal external female genitalia without lesions. Normal hair distribution. Adequate perineal body, normal urethral meatus. Urethra with no masses.  Bladder nontender. Vagina moist and well rugated without lesions or discharge. Cervix pink and without lesions. No significant cystocele or rectocele. Bimanual exam showed uterus normal size, shape, position, mobile and nontender. Adnexa without masses or tenderness. Urethra and bladder normal.   EXTREMITIES: No clubbing cyanosis or edema.    ASSESSMENT/PLAN:  Routine gynecological examination    Hormone replacement therapy (HRT)  -     FOLLICLE STIMULATING HORMONE; Future; Expected date: 03/18/2025  -     ESTRADIOL; Future; Expected date: 03/18/2025  -     Progesterone; Future; Expected date: 03/18/2025  -     US Pelvis Comp with Transvag NON-OB (xpd; Future; Expected date: 03/18/2025  -     medroxyPROGESTERone (PROVERA) 10 MG tablet; Take 1 tablet (10 mg total) by mouth once daily.  Dispense: 90 tablet; Refill: 3    Perimenopause  -     US Pelvis Comp with Transvag NON-OB (xpd; Future; Expected date: 03/18/2025    S/P endometrial " ablation  -     US Pelvis Comp with Transvag NON-OB (xpd; Future; Expected date: 2025    Abnormal uterine bleeding (AUB)  -     US Pelvis Comp with Transvag NON-OB (xpd; Future; Expected date: 2025    S/P gastric bypass    Acquired hypothyroidism  -     levothyroxine (SYNTHROID) 100 MCG tablet; Take 1 tablet (100 mcg total) by mouth before breakfast.  Dispense: 90 tablet; Refill: 3    Encounter for screening mammogram for malignant neoplasm of breast  -     Mammo Digital Screening Bilat w/ Rico (XPD); Future; Expected date: 2025          Patient was counseled today on Pelvic exams and Pap Smear guidelines.   We discussed STD screening if at high risk for a STD.  We discussed recommendation for breast cancer screening with mammogram every other year after the age of 40 and annually after the age of 50.    We discussed colon cancer screening when indicated.   Osteoporosis screening discussed when indicated.   She was advised to see her primary care physician for all other health maintenance.     FOLLOW-UP with me for next routine visit.          [1]   Outpatient Medications Prior to Visit   Medication Sig Dispense Refill    buPROPion (WELLBUTRIN XL) 150 MG TB24 tablet Take 1 tablet (150 mg total) by mouth once daily. 90 tablet 2    ibuprofen (ADVIL,MOTRIN) 800 MG tablet Take 1 tablet (800 mg total) by mouth every 8 (eight) hours as needed for Pain. 20 tablet 0    levothyroxine (SYNTHROID) 100 MCG tablet Take 1 tablet (100 mcg total) by mouth before breakfast. 90 tablet 3    progesterone (PROMETRIUM) 100 MG capsule Take 1 capsule (100 mg total) by mouth nightly. 90 capsule 3    progesterone (PROMETRIUM) 200 MG capsule Take 1 capsule (200 mg total) by mouth nightly. 90 capsule 3    EPINEPHrine (EPIPEN) 0.3 mg/0.3 mL AtIn SMARTSI Pre-Filled Pen Syringe IM Once PRN (Patient not taking: Reported on 3/18/2025)       Facility-Administered Medications Prior to Visit   Medication Dose Route Frequency  Provider Last Rate Last Admin    estradiol valerate (DELESTROGEN) injection 20 mg/mL  30 mg Intramuscular Q30 Days Nina Edwards MD   30 mg at 03/12/25 0906

## 2025-04-28 ENCOUNTER — TELEPHONE (OUTPATIENT)
Dept: OBSTETRICS AND GYNECOLOGY | Facility: CLINIC | Age: 50
End: 2025-04-28
Payer: COMMERCIAL

## 2025-04-28 DIAGNOSIS — Z79.890 HORMONE REPLACEMENT THERAPY (HRT): Primary | ICD-10-CM

## 2025-04-28 NOTE — TELEPHONE ENCOUNTER
----- Message from Jaime sent at 4/28/2025 11:28 AM CDT -----  Contact: Pt [phone]  Type:  Needs Medical Advice / Req orders for labsWho Called: PtWould the patient rather a call back or a response via [facility]? Call or msg in [facility] LiaisonBest Call Back Number: [phone] Additional Information: Pt adv she was told to repeat labs and is calling to get lab orders. Pls call back or msg in portal and adv. Thank you.

## 2025-04-29 ENCOUNTER — LAB VISIT (OUTPATIENT)
Dept: LAB | Facility: HOSPITAL | Age: 50
End: 2025-04-29
Attending: OBSTETRICS & GYNECOLOGY
Payer: COMMERCIAL

## 2025-04-29 DIAGNOSIS — Z79.890 HORMONE REPLACEMENT THERAPY (HRT): ICD-10-CM

## 2025-04-29 LAB — ESTRADIOL SERPL HS-MCNC: <10 PG/ML

## 2025-04-29 PROCEDURE — 82670 ASSAY OF TOTAL ESTRADIOL: CPT

## 2025-04-29 PROCEDURE — 36415 COLL VENOUS BLD VENIPUNCTURE: CPT | Mod: PN

## 2025-04-30 ENCOUNTER — PATIENT MESSAGE (OUTPATIENT)
Dept: OBSTETRICS AND GYNECOLOGY | Facility: CLINIC | Age: 50
End: 2025-04-30
Payer: COMMERCIAL

## 2025-04-30 ENCOUNTER — RESULTS FOLLOW-UP (OUTPATIENT)
Dept: OBSTETRICS AND GYNECOLOGY | Facility: CLINIC | Age: 50
End: 2025-04-30

## 2025-04-30 DIAGNOSIS — Z79.890 HORMONE REPLACEMENT THERAPY (HRT): Primary | ICD-10-CM

## 2025-04-30 RX ORDER — PROGESTERONE 200 MG/1
200 CAPSULE ORAL NIGHTLY
Qty: 90 CAPSULE | Refills: 3 | Status: SHIPPED | OUTPATIENT
Start: 2025-04-30 | End: 2026-04-30

## 2025-05-01 RX ORDER — ESTRADIOL VALERATE 20 MG/ML
20 INJECTION INTRAMUSCULAR SEE ADMIN INSTRUCTIONS
Status: SHIPPED | OUTPATIENT
Start: 2025-05-01

## 2025-05-02 ENCOUNTER — CLINICAL SUPPORT (OUTPATIENT)
Dept: OBSTETRICS AND GYNECOLOGY | Facility: CLINIC | Age: 50
End: 2025-05-02
Payer: COMMERCIAL

## 2025-05-02 DIAGNOSIS — Z79.890 HORMONE REPLACEMENT THERAPY (HRT): Primary | ICD-10-CM

## 2025-05-02 RX ADMIN — ESTRADIOL VALERATE 20 MG: 20 INJECTION INTRAMUSCULAR at 11:05

## 2025-05-02 NOTE — PROGRESS NOTES
2 patient identifiers verified. Patient tolerated injection well. Instructed to return in 5 weeks  for next injection

## 2025-05-14 ENCOUNTER — OFFICE VISIT (OUTPATIENT)
Dept: FAMILY MEDICINE | Facility: CLINIC | Age: 50
End: 2025-05-14
Payer: COMMERCIAL

## 2025-05-14 ENCOUNTER — LAB VISIT (OUTPATIENT)
Dept: LAB | Facility: HOSPITAL | Age: 50
End: 2025-05-14
Attending: STUDENT IN AN ORGANIZED HEALTH CARE EDUCATION/TRAINING PROGRAM
Payer: COMMERCIAL

## 2025-05-14 VITALS
DIASTOLIC BLOOD PRESSURE: 60 MMHG | HEART RATE: 72 BPM | SYSTOLIC BLOOD PRESSURE: 104 MMHG | WEIGHT: 137.25 LBS | OXYGEN SATURATION: 98 % | BODY MASS INDEX: 23.43 KG/M2 | HEIGHT: 64 IN

## 2025-05-14 DIAGNOSIS — Z00.00 ANNUAL PHYSICAL EXAM: Primary | ICD-10-CM

## 2025-05-14 DIAGNOSIS — Z98.84 S/P GASTRIC BYPASS: ICD-10-CM

## 2025-05-14 DIAGNOSIS — Z00.00 ANNUAL PHYSICAL EXAM: ICD-10-CM

## 2025-05-14 DIAGNOSIS — E03.9 ACQUIRED HYPOTHYROIDISM: ICD-10-CM

## 2025-05-14 DIAGNOSIS — F41.9 ANXIETY: ICD-10-CM

## 2025-05-14 LAB
ALBUMIN SERPL BCP-MCNC: 3.6 G/DL (ref 3.5–5.2)
ALP SERPL-CCNC: 49 UNIT/L (ref 40–150)
ALT SERPL W/O P-5'-P-CCNC: 17 UNIT/L (ref 10–44)
ANION GAP (OHS): 9 MMOL/L (ref 8–16)
AST SERPL-CCNC: 20 UNIT/L (ref 11–45)
BILIRUB SERPL-MCNC: 0.3 MG/DL (ref 0.1–1)
BUN SERPL-MCNC: 10 MG/DL (ref 6–20)
CALCIUM SERPL-MCNC: 9.1 MG/DL (ref 8.7–10.5)
CHLORIDE SERPL-SCNC: 106 MMOL/L (ref 95–110)
CHOLEST SERPL-MCNC: 192 MG/DL (ref 120–199)
CHOLEST/HDLC SERPL: 2.7 {RATIO} (ref 2–5)
CO2 SERPL-SCNC: 22 MMOL/L (ref 23–29)
CREAT SERPL-MCNC: 0.8 MG/DL (ref 0.5–1.4)
ERYTHROCYTE [DISTWIDTH] IN BLOOD BY AUTOMATED COUNT: 13 % (ref 11.5–14.5)
FERRITIN SERPL-MCNC: 22 NG/ML (ref 20–300)
GFR SERPLBLD CREATININE-BSD FMLA CKD-EPI: >60 ML/MIN/1.73/M2
GLUCOSE SERPL-MCNC: 74 MG/DL (ref 70–110)
HCT VFR BLD AUTO: 39.8 % (ref 37–48.5)
HDLC SERPL-MCNC: 71 MG/DL (ref 40–75)
HDLC SERPL: 37 % (ref 20–50)
HGB BLD-MCNC: 12.9 GM/DL (ref 12–16)
IRON SATN MFR SERPL: 24 % (ref 20–50)
IRON SERPL-MCNC: 105 UG/DL (ref 30–160)
LDLC SERPL CALC-MCNC: 104.8 MG/DL (ref 63–159)
MCH RBC QN AUTO: 32.2 PG (ref 27–31)
MCHC RBC AUTO-ENTMCNC: 32.4 G/DL (ref 32–36)
MCV RBC AUTO: 99 FL (ref 82–98)
NONHDLC SERPL-MCNC: 121 MG/DL
PLATELET # BLD AUTO: 260 K/UL (ref 150–450)
PMV BLD AUTO: 11.4 FL (ref 9.2–12.9)
POTASSIUM SERPL-SCNC: 3.8 MMOL/L (ref 3.5–5.1)
PROT SERPL-MCNC: 6.9 GM/DL (ref 6–8.4)
PTH-INTACT SERPL-MCNC: 53.5 PG/ML (ref 9–77)
RBC # BLD AUTO: 4.01 M/UL (ref 4–5.4)
SODIUM SERPL-SCNC: 137 MMOL/L (ref 136–145)
TIBC SERPL-MCNC: 445 UG/DL (ref 250–450)
TRANSFERRIN SERPL-MCNC: 301 MG/DL (ref 200–375)
TRIGL SERPL-MCNC: 81 MG/DL (ref 30–150)
TSH SERPL-ACNC: 1.55 UIU/ML (ref 0.4–4)
VIT B12 SERPL-MCNC: 281 PG/ML (ref 210–950)
WBC # BLD AUTO: 6.89 K/UL (ref 3.9–12.7)

## 2025-05-14 PROCEDURE — 82728 ASSAY OF FERRITIN: CPT

## 2025-05-14 PROCEDURE — 1160F RVW MEDS BY RX/DR IN RCRD: CPT | Mod: CPTII,S$GLB,, | Performed by: STUDENT IN AN ORGANIZED HEALTH CARE EDUCATION/TRAINING PROGRAM

## 2025-05-14 PROCEDURE — 80061 LIPID PANEL: CPT

## 2025-05-14 PROCEDURE — 85027 COMPLETE CBC AUTOMATED: CPT

## 2025-05-14 PROCEDURE — 3074F SYST BP LT 130 MM HG: CPT | Mod: CPTII,S$GLB,, | Performed by: STUDENT IN AN ORGANIZED HEALTH CARE EDUCATION/TRAINING PROGRAM

## 2025-05-14 PROCEDURE — 99396 PREV VISIT EST AGE 40-64: CPT | Mod: S$GLB,,, | Performed by: STUDENT IN AN ORGANIZED HEALTH CARE EDUCATION/TRAINING PROGRAM

## 2025-05-14 PROCEDURE — 80053 COMPREHEN METABOLIC PANEL: CPT

## 2025-05-14 PROCEDURE — 99999 PR PBB SHADOW E&M-EST. PATIENT-LVL IV: CPT | Mod: PBBFAC,,, | Performed by: STUDENT IN AN ORGANIZED HEALTH CARE EDUCATION/TRAINING PROGRAM

## 2025-05-14 PROCEDURE — 82607 VITAMIN B-12: CPT

## 2025-05-14 PROCEDURE — 3078F DIAST BP <80 MM HG: CPT | Mod: CPTII,S$GLB,, | Performed by: STUDENT IN AN ORGANIZED HEALTH CARE EDUCATION/TRAINING PROGRAM

## 2025-05-14 PROCEDURE — 1159F MED LIST DOCD IN RCRD: CPT | Mod: CPTII,S$GLB,, | Performed by: STUDENT IN AN ORGANIZED HEALTH CARE EDUCATION/TRAINING PROGRAM

## 2025-05-14 PROCEDURE — 82652 VIT D 1 25-DIHYDROXY: CPT

## 2025-05-14 PROCEDURE — 36415 COLL VENOUS BLD VENIPUNCTURE: CPT | Mod: PN

## 2025-05-14 PROCEDURE — 83970 ASSAY OF PARATHORMONE: CPT

## 2025-05-14 PROCEDURE — 84590 ASSAY OF VITAMIN A: CPT

## 2025-05-14 PROCEDURE — 84443 ASSAY THYROID STIM HORMONE: CPT

## 2025-05-14 PROCEDURE — 84466 ASSAY OF TRANSFERRIN: CPT

## 2025-05-14 PROCEDURE — 3008F BODY MASS INDEX DOCD: CPT | Mod: CPTII,S$GLB,, | Performed by: STUDENT IN AN ORGANIZED HEALTH CARE EDUCATION/TRAINING PROGRAM

## 2025-05-14 RX ORDER — BUPROPION HYDROCHLORIDE 150 MG/1
150 TABLET ORAL DAILY
Qty: 90 TABLET | Refills: 3 | Status: SHIPPED | OUTPATIENT
Start: 2025-05-14

## 2025-05-14 NOTE — PROGRESS NOTES
Name: Brenna Soni  MRN: [id number]  : 1975  PCP: Brian Sanchez MD    Subjective   HPI  Patient presents for regular check up. No acute concerns at this time.    Review of Systems   Respiratory:  Negative for shortness of breath.    Cardiovascular:  Negative for chest pain.   Neurological:  Negative for dizziness and light-headedness.   Psychiatric/Behavioral:  Positive for sleep disturbance (recent changes in estrogen/progesterone therapy).         Problem List[1]    Medications Ordered Prior to Encounter[2]    Past Medical History:   Diagnosis Date    Abnormal Pap smear of cervix     cryo    Anal fissure     Hypothyroid 2013    Postmenopausal bleeding 2022    Rectal bleeding     TBI (traumatic brain injury) 2012    concussion, CT done.      Vertigo     Water retention        Past Surgical History:   Procedure Laterality Date    ABDOMINOPLASTY  2015    AUGMENTATION OF BREAST Bilateral     Lift also    BREAST SURGERY  2012     SECTION      COLONOSCOPY N/A 2022    Procedure: COLONOSCOPY;  Surgeon: Faith Valerio MD;  Location: [facility];  Service: Endoscopy;  Laterality: N/A;    COSMETIC SURGERY  2015    ENDOMETRIAL ABLATION      HYSTEROSCOPY WITH DILATION AND CURETTAGE OF UTERUS N/A 2022    Procedure: HYSTEROSCOPY, WITH DILATION AND CURETTAGE OF UTERUS;  Surgeon: Nina Edwards MD;  Location: [facility];  Service: OB/GYN;  Laterality: N/A;    LAPAROSCOPY      X2 endometriosis    SLEEVE GASTROPLASTY          Family History   Adopted: Yes   Problem Relation Name Age of Onset    Heart attack Other          Maternal side    Stroke Other          Maternal side       Social History[3]    Review of patient's allergies indicates:  No Known Allergies     Health Maintenance Due   Topic Date Due    HIV Screening  Never done    Shingles Vaccine (1 of 2) Never done    Pneumococcal Vaccines (Age 50+) (1 of 1 - PCV) Never done    Lipid Panel   03/22/2024    COVID-19 Vaccine (4 - 2024-25 season) 09/01/2024    Mammogram  05/15/2025       Objective   Vitals:    05/14/25 1145   BP: 104/60   Pulse:         Physical Exam  Constitutional:       General: She is not in acute distress.     Appearance: Normal appearance. She is well-developed.   HENT:      Head: Normocephalic and atraumatic.      Right Ear: External ear normal.      Left Ear: External ear normal.   Eyes:      Conjunctiva/sclera: Conjunctivae normal.   Cardiovascular:      Rate and Rhythm: Normal rate and regular rhythm.      Heart sounds: No murmur heard.     No friction rub. No gallop.   Pulmonary:      Effort: Pulmonary effort is normal. No respiratory distress.      Breath sounds: No wheezing, rhonchi or rales.   Abdominal:      General: Abdomen is flat. There is no distension.   Musculoskeletal:         General: No swelling or deformity.      Right lower leg: No edema.      Left lower leg: No edema.   Skin:     General: Skin is warm and dry.      Coloration: Skin is not jaundiced.   Neurological:      Mental Status: She is alert and oriented to person, place, and time. Mental status is at baseline.   Psychiatric:         Attention and Perception: Attention and perception normal.         Mood and Affect: Mood normal.         Speech: Speech normal.         Behavior: Behavior normal. Behavior is cooperative.         Thought Content: Thought content normal.         Cognition and Memory: Cognition normal.         Judgment: Judgment normal.          Assessment & Plan    1. Annual physical exam  -     CBC Without Differential; Future; Expected date: 05/14/2025  -     Comprehensive Metabolic Panel; Future; Expected date: 05/14/2025  -     Lipid Panel; Future; Expected date: 05/14/2025    2. S/P gastric bypass  -     CBC Without Differential; Future; Expected date: 05/14/2025  -     Comprehensive Metabolic Panel; Future; Expected date: 05/14/2025  -     PTH, Intact; Future; Expected date: 05/14/2025  -      Vitamin A; Future; Expected date: 2025  -     Vitamin B12; Future; Expected date: 2025  -     Iron and TIBC; Future; Expected date: 2025  -     Ferritin; Future; Expected date: 2025  -     Calcitriol; Future; Expected date: 2025    3. Acquired hypothyroidism  -     TSH; Future; Expected date: 2025  -     Lipid Panel; Future; Expected date: 2025    4. Anxiety  -     buPROPion (WELLBUTRIN XL) 150 MG TB24 tablet; Take 1 tablet (150 mg total) by mouth once daily.  Dispense: 90 tablet; Refill: 3         Follow up in 1 year.     Brian Sanchez MD  2025          [1]   Patient Active Problem List  Diagnosis    Acquired hypothyroidism    TBI (traumatic brain injury)    Localized adiposity    Chronic bilateral low back pain without sciatica    Lumbar spondylosis    Myofascial pain syndrome    Jersey finger, initial encounter    Finger mass, left    Hormone replacement therapy (HRT)    Chronic fatigue    Other insomnia    Anxiety    S/P gastric bypass    Left leg pain   [2]   Current Outpatient Medications on File Prior to Visit   Medication Sig Dispense Refill    ibuprofen (ADVIL,MOTRIN) 800 MG tablet Take 1 tablet (800 mg total) by mouth every 8 (eight) hours as needed for Pain. 20 tablet 0    levothyroxine (SYNTHROID) 100 MCG tablet Take 1 tablet (100 mcg total) by mouth before breakfast. 90 tablet 3    medroxyPROGESTERone (PROVERA) 10 MG tablet Take 1 tablet (10 mg total) by mouth once daily. 90 tablet 3    progesterone (PROMETRIUM) 200 MG capsule Take 1 capsule (200 mg total) by mouth nightly. 90 capsule 3    [DISCONTINUED] buPROPion (WELLBUTRIN XL) 150 MG TB24 tablet Take 1 tablet (150 mg total) by mouth once daily. 90 tablet 2    EPINEPHrine (EPIPEN) 0.3 mg/0.3 mL AtIn SMARTSI Pre-Filled Pen Syringe IM Once PRN (Patient not taking: Reported on 2025)       Current Facility-Administered Medications on File Prior to Visit   Medication Dose Route Frequency  Provider Last Rate Last Admin    estradiol valerate (DELESTROGEN) injection 20 mg/mL  30 mg Intramuscular Q30 Days Nina Edwards MD   30 mg at 03/12/25 0906    estradiol valerate (DELESTROGEN) injection 20 mg/mL  20 mg Intramuscular See admin instructions Nina Edwards MD   20 mg at 05/02/25 1105   [3]   Social History  Socioeconomic History    Marital status:    Occupational History    Occupation:      Employer: [employer]   Tobacco Use    Smoking status: Never    Smokeless tobacco: Never   Substance and Sexual Activity    Alcohol use: Yes     Alcohol/week: 1.0 standard drink of alcohol     Types: 1 Glasses of wine per week     Comment: 2-3 glasses of wine/month    Drug use: Never    Sexual activity: Not Currently     Partners: Male     Birth control/protection: None   Social History Narrative    Lives with  and step son. One dog. Enjoys reading, exercising, playing piano, writing poems, travel.     Social Drivers of Health     Financial Resource Strain: Low Risk  (11/6/2024)    Overall Financial Resource Strain (CARDIA)     Difficulty of Paying Living Expenses: Not hard at all   Food Insecurity: No Food Insecurity (11/6/2024)    Hunger Vital Sign     Worried About Running Out of Food in the Last Year: Never true     Ran Out of Food in the Last Year: Never true   Transportation Needs: No Transportation Needs (6/25/2023)    PRAPARE - Transportation     Lack of Transportation (Medical): No     Lack of Transportation (Non-Medical): No   Physical Activity: Insufficiently Active (11/6/2024)    Exercise Vital Sign     Days of Exercise per Week: 4 days     Minutes of Exercise per Session: 30 min   Stress: Stress Concern Present (11/6/2024)    [employer] - Occupational Stress Questionnaire     Feeling of Stress : Rather much   Housing Stability: Low Risk  (6/25/2023)    Housing Stability Vital Sign     Unable to Pay for  Housing in the Last Year: No     Number of Places Lived in the Last Year: 1     Unstable Housing in the Last Year: No

## 2025-05-14 NOTE — PATIENT INSTRUCTIONS
We discussed the pneumococcal vaccines - the two major ones available are the PCV 20 and PCV 21. I prefer the latter but either one works well.

## 2025-05-15 ENCOUNTER — RESULTS FOLLOW-UP (OUTPATIENT)
Dept: FAMILY MEDICINE | Facility: CLINIC | Age: 50
End: 2025-05-15

## 2025-05-16 ENCOUNTER — PATIENT MESSAGE (OUTPATIENT)
Dept: OBSTETRICS AND GYNECOLOGY | Facility: CLINIC | Age: 50
End: 2025-05-16
Payer: COMMERCIAL

## 2025-05-16 DIAGNOSIS — R23.4 BREAST SKIN CHANGES: Primary | ICD-10-CM

## 2025-05-16 LAB — W VITAMIN D, 1, 25-DIHYDROXY: 56 PG/ML

## 2025-05-20 LAB — W VITAMIN A: 43 UG/DL

## 2025-06-05 ENCOUNTER — CLINICAL SUPPORT (OUTPATIENT)
Dept: OBSTETRICS AND GYNECOLOGY | Facility: CLINIC | Age: 50
End: 2025-06-05
Payer: COMMERCIAL

## 2025-06-05 DIAGNOSIS — Z79.890 HORMONE REPLACEMENT THERAPY (HRT): Primary | ICD-10-CM

## 2025-06-05 PROCEDURE — 96372 THER/PROPH/DIAG INJ SC/IM: CPT | Mod: S$GLB,,, | Performed by: OBSTETRICS & GYNECOLOGY

## 2025-06-05 RX ADMIN — ESTRADIOL VALERATE 20 MG: 20 INJECTION INTRAMUSCULAR at 11:06

## 2025-07-10 ENCOUNTER — CLINICAL SUPPORT (OUTPATIENT)
Dept: OBSTETRICS AND GYNECOLOGY | Facility: CLINIC | Age: 50
End: 2025-07-10
Payer: COMMERCIAL

## 2025-07-10 DIAGNOSIS — Z79.890 HORMONE REPLACEMENT THERAPY (HRT): Primary | ICD-10-CM

## 2025-07-10 PROCEDURE — 96372 THER/PROPH/DIAG INJ SC/IM: CPT | Mod: S$GLB,,, | Performed by: OBSTETRICS & GYNECOLOGY

## 2025-07-10 RX ADMIN — ESTRADIOL VALERATE 20 MG: 20 INJECTION INTRAMUSCULAR at 11:07

## 2025-07-10 NOTE — PROGRESS NOTES
2 patient identifiers verified. Patient tolerated delestrogen injection well. Instructed to return in5 weeks for next injection

## 2025-08-14 ENCOUNTER — CLINICAL SUPPORT (OUTPATIENT)
Dept: OBSTETRICS AND GYNECOLOGY | Facility: CLINIC | Age: 50
End: 2025-08-14
Payer: COMMERCIAL

## 2025-08-14 DIAGNOSIS — Z79.890 HORMONE REPLACEMENT THERAPY (HRT): Primary | ICD-10-CM

## 2025-08-14 PROCEDURE — 96372 THER/PROPH/DIAG INJ SC/IM: CPT | Mod: S$GLB,,, | Performed by: OBSTETRICS & GYNECOLOGY

## 2025-08-14 RX ADMIN — ESTRADIOL VALERATE 30 MG: 20 INJECTION INTRAMUSCULAR at 10:08

## 2025-08-24 ENCOUNTER — PATIENT MESSAGE (OUTPATIENT)
Dept: OBSTETRICS AND GYNECOLOGY | Facility: CLINIC | Age: 50
End: 2025-08-24
Payer: COMMERCIAL

## 2025-08-24 DIAGNOSIS — F41.9 ANXIETY: ICD-10-CM

## 2025-08-25 RX ORDER — BUPROPION HYDROCHLORIDE 150 MG/1
150 TABLET ORAL DAILY
Qty: 90 TABLET | Refills: 2 | Status: SHIPPED | OUTPATIENT
Start: 2025-08-25

## 2025-08-25 RX ORDER — BUPROPION HYDROCHLORIDE 75 MG/1
75 TABLET ORAL 2 TIMES DAILY
Qty: 60 TABLET | Refills: 11 | OUTPATIENT
Start: 2025-08-25

## 2025-08-25 RX ORDER — PROGESTERONE 200 MG/1
200 CAPSULE ORAL NIGHTLY
Qty: 90 CAPSULE | Refills: 3 | Status: SHIPPED | OUTPATIENT
Start: 2025-08-25 | End: 2026-08-25